# Patient Record
Sex: FEMALE | Race: BLACK OR AFRICAN AMERICAN | Employment: FULL TIME | URBAN - METROPOLITAN AREA
[De-identification: names, ages, dates, MRNs, and addresses within clinical notes are randomized per-mention and may not be internally consistent; named-entity substitution may affect disease eponyms.]

---

## 2017-01-01 ENCOUNTER — HOSPITAL ENCOUNTER (EMERGENCY)
Age: 32
Discharge: HOME | End: 2017-01-01
Attending: EMERGENCY MEDICINE
Payer: COMMERCIAL

## 2017-01-01 VITALS
RESPIRATION RATE: 18 BRPM | DIASTOLIC BLOOD PRESSURE: 79 MMHG | OXYGEN SATURATION: 99 % | TEMPERATURE: 98 F | BODY MASS INDEX: 40.18 KG/M2 | WEIGHT: 280 LBS | SYSTOLIC BLOOD PRESSURE: 115 MMHG | HEART RATE: 63 BPM
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
## 2017-01-01 DIAGNOSIS — G89.29 ACUTE EXACERBATION OF CHRONIC LOW BACK PAIN: ICD-10-CM

## 2017-01-01 DIAGNOSIS — M54.50 ACUTE EXACERBATION OF CHRONIC LOW BACK PAIN: ICD-10-CM

## 2017-01-01 PROCEDURE — 99282 EMERGENCY DEPT VISIT SF MDM: CPT

## 2017-01-01 RX ORDER — IBUPROFEN 800 MG/1
800 TABLET ORAL
Qty: 20 TAB | Refills: 0 | Status: SHIPPED | OUTPATIENT
Start: 2017-01-01 | End: 2017-01-08

## 2017-01-01 RX ORDER — TRAMADOL HYDROCHLORIDE 50 MG/1
50 TABLET ORAL
Qty: 6 TAB | Refills: 0 | Status: SHIPPED | OUTPATIENT
Start: 2017-01-01 | End: 2017-01-01

## 2017-01-01 RX ORDER — METHOCARBAMOL 500 MG/1
500 TABLET, FILM COATED ORAL 4 TIMES DAILY
Qty: 20 TAB | Refills: 0 | Status: SHIPPED | OUTPATIENT
Start: 2017-01-01 | End: 2017-01-06

## 2017-01-02 NOTE — ED NOTES
Suhas Greer is a 33 y.o. female that was discharged in stable. Pt was accompanied by self. Pt is driving. The patients diagnosis, condition and treatment were explained to  patient and aftercare instructions were given. The patient verbalized understanding. Patient armband removed and shredded.
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
## 2017-03-13 ENCOUNTER — CLINICAL SUPPORT (OUTPATIENT)
Dept: SURGERY | Age: 32
End: 2017-03-13

## 2017-03-13 ENCOUNTER — HOSPITAL ENCOUNTER (OUTPATIENT)
Dept: LAB | Age: 32
Discharge: HOME | End: 2017-03-13
Payer: COMMERCIAL

## 2017-03-13 ENCOUNTER — OFFICE VISIT (OUTPATIENT)
Dept: SURGERY | Age: 32
End: 2017-03-13

## 2017-03-13 VITALS
OXYGEN SATURATION: 100 % | WEIGHT: 286 LBS | HEIGHT: 70 IN | BODY MASS INDEX: 40.94 KG/M2 | HEART RATE: 70 BPM | DIASTOLIC BLOOD PRESSURE: 83 MMHG | SYSTOLIC BLOOD PRESSURE: 138 MMHG

## 2017-03-13 VITALS — BODY MASS INDEX: 40.94 KG/M2 | WEIGHT: 286 LBS | HEIGHT: 70 IN

## 2017-03-13 DIAGNOSIS — E28.2 PCOS (POLYCYSTIC OVARIAN SYNDROME): ICD-10-CM

## 2017-03-13 DIAGNOSIS — N92.6 IRREGULAR MENSES: ICD-10-CM

## 2017-03-13 DIAGNOSIS — M25.562 ARTHRALGIA OF BOTH KNEES: ICD-10-CM

## 2017-03-13 DIAGNOSIS — E66.01 MORBID OBESITY WITH BMI OF 40.0-44.9, ADULT (HCC): Primary | ICD-10-CM

## 2017-03-13 DIAGNOSIS — E11.9 TYPE 2 DIABETES MELLITUS WITHOUT COMPLICATION, WITHOUT LONG-TERM CURRENT USE OF INSULIN (HCC): ICD-10-CM

## 2017-03-13 DIAGNOSIS — M54.9 CHRONIC BACK PAIN, UNSPECIFIED BACK LOCATION, UNSPECIFIED BACK PAIN LATERALITY: ICD-10-CM

## 2017-03-13 DIAGNOSIS — E66.01 MORBID OBESITY WITH BODY MASS INDEX OF 40.0-49.9 (HCC): ICD-10-CM

## 2017-03-13 DIAGNOSIS — I10 ESSENTIAL HYPERTENSION: ICD-10-CM

## 2017-03-13 DIAGNOSIS — M25.561 ARTHRALGIA OF BOTH KNEES: ICD-10-CM

## 2017-03-13 DIAGNOSIS — G89.29 CHRONIC BACK PAIN, UNSPECIFIED BACK LOCATION, UNSPECIFIED BACK PAIN LATERALITY: ICD-10-CM

## 2017-03-13 DIAGNOSIS — E66.01 MORBID OBESITY DUE TO EXCESS CALORIES (HCC): Primary | ICD-10-CM

## 2017-03-13 LAB
BASOPHILS # BLD AUTO: 0 K/UL (ref 0–0.06)
BASOPHILS # BLD: 0 % (ref 0–2)
DIFFERENTIAL METHOD BLD: ABNORMAL
EOSINOPHIL # BLD: 0.5 K/UL (ref 0–0.4)
EOSINOPHIL NFR BLD: 4 % (ref 0–5)
ERYTHROCYTE [DISTWIDTH] IN BLOOD BY AUTOMATED COUNT: 13.6 % (ref 11.6–14.5)
HCT VFR BLD AUTO: 37.3 % (ref 35–45)
HGB BLD-MCNC: 12.1 G/DL (ref 12–16)
LYMPHOCYTES # BLD AUTO: 35 % (ref 21–52)
LYMPHOCYTES # BLD: 4.5 K/UL (ref 0.9–3.6)
MCH RBC QN AUTO: 26.9 PG (ref 24–34)
MCHC RBC AUTO-ENTMCNC: 32.4 G/DL (ref 31–37)
MCV RBC AUTO: 83.1 FL (ref 74–97)
MONOCYTES # BLD: 0.7 K/UL (ref 0.05–1.2)
MONOCYTES NFR BLD AUTO: 5 % (ref 3–10)
NEUTS SEG # BLD: 7.2 K/UL (ref 1.8–8)
NEUTS SEG NFR BLD AUTO: 56 % (ref 40–73)
PLATELET # BLD AUTO: 322 K/UL (ref 135–420)
PMV BLD AUTO: 9.5 FL (ref 9.2–11.8)
RBC # BLD AUTO: 4.49 M/UL (ref 4.2–5.3)
WBC # BLD AUTO: 12.9 K/UL (ref 4.6–13.2)

## 2017-03-13 PROCEDURE — 86677 HELICOBACTER PYLORI ANTIBODY: CPT | Performed by: SPECIALIST

## 2017-03-13 PROCEDURE — 36415 COLL VENOUS BLD VENIPUNCTURE: CPT | Performed by: SPECIALIST

## 2017-03-13 PROCEDURE — 85025 COMPLETE CBC W/AUTO DIFF WBC: CPT | Performed by: SPECIALIST

## 2017-03-13 PROCEDURE — 84443 ASSAY THYROID STIM HORMONE: CPT | Performed by: SPECIALIST

## 2017-03-13 NOTE — PATIENT INSTRUCTIONS
Body Mass Index: Care Instructions  Your Care Instructions    Body mass index (BMI) can help you see if your weight is raising your risk for health problems. It uses a formula to compare how much you weigh with how tall you are. A BMI between 18.5 and 24.9 is considered healthy. A BMI between 25 and 29.9 is considered overweight. A BMI of 30 or higher is considered obese. If your BMI is in the normal range, it means that you have a lower risk for weight-related health problems. If your BMI is in the overweight or obese range, you may be at increased risk for weight-related health problems, such as high blood pressure, heart disease, stroke, arthritis or joint pain, and diabetes. BMI is just one measure of your risk for weight-related health problems. You may be at higher risk for health problems if you are not active, you eat an unhealthy diet, or you drink too much alcohol or use tobacco products. Follow-up care is a key part of your treatment and safety. Be sure to make and go to all appointments, and call your doctor if you are having problems. It's also a good idea to know your test results and keep a list of the medicines you take. How can you care for yourself at home? · Practice healthy eating habits. This includes eating plenty of fruits, vegetables, whole grains, lean protein, and low-fat dairy. · Get at least 30 minutes of exercise 5 days a week or more. Brisk walking is a good choice. You also may want to do other activities, such as running, swimming, cycling, or playing tennis or team sports. · Do not smoke. Smoking can increase your risk for health problems. If you need help quitting, talk to your doctor about stop-smoking programs and medicines. These can increase your chances of quitting for good. · Limit alcohol to 2 drinks a day for men and 1 drink a day for women. Too much alcohol can cause health problems.   If you have a BMI higher than 25  · Your doctor may do other tests to check your risk for weight-related health problems. This may include measuring the distance around your waist. A waist measurement of more than 40 inches in men or 35 inches in women can increase the risk of weight-related health problems. · Talk with your doctor about steps you can take to stay healthy or improve your health. You may need to make lifestyle changes to lose weight and stay healthy, such as changing your diet and getting regular exercise. Where can you learn more? Go to http://león-irving.info/. Enter S176 in the search box to learn more about \"Body Mass Index: Care Instructions. \"  Current as of: February 16, 2016  Content Version: 11.1  © 0563-7937 [employer]. Care instructions adapted under license by [employer] (which disclaims liability or warranty for this information). If you have questions about a medical condition or this instruction, always ask your healthcare professional. [place] [address] any warranty or liability for your use of this information. New patient Instructions      1. Ensure all pre-operative insurances requirements are complete (ie; dietary visits, psychology consults, primary care documentation, etc)    2. Adhere to pre-operative weight loss / weight maintenance plan discussed in the office today. 3. Contact the office with any questions on pre-operative clearance issues (ie; cardiology work-up, pulmonary work-up, upper GI study, etc). 4. If a barium upper GI study has been ordered for your evaluation, make sure you are on liquids only the morning of the procedure.

## 2017-03-13 NOTE — PROGRESS NOTES
Medical Weight Loss Multidisciplinary Program    Name: Cristal Whalen   : 1985    Session# 1  Date: 3/13/2017            lbs.  There is no height or weight on file to calculate BMI. Pounds Gained: 6    Dietary Instructions    Reviewed intake  Understanding label reading  Understanding low carbohydrates, low sugar, higher protein meals  Understanding proper portions  Dining outside home  Instruction given for personal dietary changes  Comments: Pt given brief pre/post-op diet ed and diet hx reviewed. Physical Activity/Exercise    Discussed Perceived Compliance  Reasonable Goals Set  Motivation  Comments: Pt does not currently have a physical activity plan in place. See goal below. Behavior Modification    Positive attitude  Comments: Pt is working on the following goals:  1. Start out doing 5-10 minutes on the treadmill everyday; and workout on Wed & Thursday. 2. Have 3 balanced meals per day with a lean protein source. 3. Avoid skipping meals; you can use one as a meal replacement per day. Candidate for surgery (per RD): Pending    Dietitian: Morgan Taylor RD     Cristal Whalen is a 33 y.o. female who present for a pre-op evaluation. There were no vitals taken for this visit.   Past Medical History:   Diagnosis Date    Diabetes ([facility] [address]     Endometrial polyp     PCOS (polycystic ovarian syndrome)     Endometriosis         Patient Education and Materials Provided:  Supplement Resource Guide, B Vitamin Information, MVI Recommendations, Calcium Citrate Information, Bariatric Supplement Companies, Protein Supplement Information, Fluid Requirements, No Caffeine or Carbonation, No Alcohol for One Year Post Op, 3 Balanced Meals a Day, Food Group Guide, Good Choices Dining Out, No Snacks, No Concentrated Sweets, Support System at Home, Exercising, Support Group Information and Addressed Current Habits / Changes to make     Reasons for Surgery:  BMI > 40 with one or more medically significant comorbidities    Summary:  Pt given brief pre/post-op diet ed and diet hx reviewed. Pt set several goals. See below. Candidate for surgery:   Pending    Procedure:  laparoscopic sleeve gastrectomy    Nutritional Hx: What is the number of meals you eat per day? 2  Comment: lunch    How many sodas do you drink per day? 1, loves Ginger Ale (1 per day), sometimes has a [employer] in addition for the caffeine (works night shift)    How many caffeinated drinks do you have per day? 2    How much water do you drink per day? 64oz    How often do you eat fast food? 7 times a week    How often do you consume alcohol? occasionally        Exercise:  Do you currently have an exercise routine? no    Goals:   1. Start out doing 5-10 minutes on the treadmill everyday; and workout on Wed & Thursday. 2. Have 3 balanced meals per day with a lean protein source. 3. Avoid skipping meals; you can use one as a meal replacement per day.

## 2017-03-13 NOTE — PROGRESS NOTES
Bariatric Surgery Consultation    Subjective: The patient is a 33 y.o. obese female with a Body mass index is 41.04 kg/(m^2). Herman Riojas The patient is currently her heaviest weight for the past 2 years. she has been overweight since early adulthood. she has been considering surgery since last year. she desires surgery at this time because of multiple health concerns and their lifestyle issues which are hindered by their weight. she has been referred by his family physician Dr Jaycob Cowan for evaluation and treatment of their obesity via surgical intervention. Pawan Reed has tried multiple diets in her lifetime most recently tried behavior modification and unsupervised diets    Bariatric comorbidities present are   Patient Active Problem List   Diagnosis Code    PCOS (polycystic ovarian syndrome) E28.2    Diabetes ([address] E11.9    Endometrial polyp N84.0    Morbid obesity ([address] E66.01    Morbid obesity with body mass index of 40.0-49.9 ([address] E66.01    Hypertension I10    Irregular menses N92.6    Arthritic-like pain M25.50    Chronic back pain M54.9, G89.29       The patient is considering laparoscopic sleeve gastrectomy for surgical weight loss due to their ineffective progress with medical forms of weight loss and the urging of their physician who cares for their primary medical issues. The patient  now presents  for consideration for weight loss surgery understanding the benefits of this over a medical approach of weight loss as was discussed in our presentation on weight loss surgery. They have discussed their plans both with their family and primary care physician who is in support of their pursuit of such. The patient has had no health issues as of late and denies and gastrointestinal disturbances other than what is outlined below in their review of symptoms.  All of their prior evaluations available by both their PCP's and specialists physicians have been reviewed today either in the Care Everywhere portal or scanned under the media tab. I have spent a large portion of my initial consultation today reviewing the patients current dietary habits which have contributed to their health issues and obesity. I have suggested to them personally a dietary regimen that they can initiate now to help with their status as it pertains to their weight. They understand that the most important aspect of their journey through their weight loss endeavor will be their adherence to a new lifestyle of healthy eating behavior. They also understand that an adherence to an exercise program will not only help with weight loss but is ultimately important in weight maintenance. The patients goal weight is 188 lb. These goals are consistent with expected outcomes of their desired operation. her Medical goals are resolution of these health issues. Patient Active Problem List    Diagnosis Date Noted    Diabetes [address]     Endometrial polyp     Morbid obesity ([facility]     Morbid obesity with body mass index of 40.0-49.9 (HCC)     Hypertension     Irregular menses     Arthritic-like pain     Chronic back pain     PCOS (polycystic ovarian syndrome) 12/03/2014     History reviewed. No pertinent surgical history. Social History   Substance Use Topics    Smoking status: Never Smoker    Smokeless tobacco: Never Used    Alcohol use Yes      Comment: rare      Family History   Problem Relation Age of Onset    Diabetes Maternal Aunt     Diabetes Paternal Grandmother     Diabetes Paternal Grandfather     Diabetes Mother       Current Outpatient Prescriptions   Medication Sig Dispense Refill    metFORMIN (GLUCOPHAGE) 1,000 mg tablet Take 1 tablet by mouth two (2) times daily (with meals). 60 tablet 5    lisinopril-hydrochlorothiazide (PRINZIDE, ZESTORETIC) 10-12.5 mg per tablet Take 1 tablet by mouth daily.  30 tablet 5     No Known Allergies       Review of Systems:     General - No history or complaints of unexpected fever, chills, or weight loss  Head/Neck - No history or complaints of headache, diplopia, dysphagia, hearing loss  Cardiac - No history or complaints of chest pain, palpitations, murmur, or shortness of breath  Pulmonary - No history or complaints of shortness of breath, productive cough, hemoptysis  Gastrointestinal - no reflux, no abdominal pain, obstipation/constipation or blood per rectum  Genitourinary - No history or complaints of hematuria/dysuria, stress urinary incontinence symptoms, or renal lithiasis  Musculoskeletal - moderate joint pain in their lower back,  no muscular weakness  Hematologic - No history or complaints of bleeding disorders,  No blood transfusions  Neurologic - No history or complaints of  migraine headaches, seizure activity, syncopal episodes, TIA or stroke  Integumentary - No history or complaints of rashes, abnormal nevi, skin cancer  Gynecological - irregular menses    Objective:     Visit Vitals    /83 (BP 1 Location: Left arm, BP Patient Position: Sitting)    Pulse 70    Ht 5' 10\" (1.778 m)    Wt 129.7 kg (286 lb)    SpO2 100%    BMI 41.04 kg/m2       Physical Examination: General appearance - alert, well appearing, and in no distress and oriented to person, place, and time  Mental status - alert, oriented to person, place, and time, normal mood, behavior, speech, dress, motor activity, and thought processes  Eyes - pupils equal and reactive, extraocular eye movements intact, sclera anicteric, left eye normal, right eye normal  Ears - bilateral TM's and external ear canals normal, right ear normal, left ear normal  Nose - normal and patent, no erythema, discharge or polyps and normal nontender sinuses  Mouth - mucous membranes moist, pharynx normal without lesions  Neck - supple, no significant adenopathy  Lymphatics - no palpable lymphadenopathy, no hepatosplenomegaly  Chest - clear to auscultation, no wheezes, rales or rhonchi, symmetric air entry  Heart - normal rate, regular rhythm, normal S1, S2, no murmurs, rubs, clicks or gallops  Abdomen - soft, nontender, nondistended, no masses or organomegaly  Back exam - full range of motion, no tenderness, palpable spasm or pain on motion  Neurological - alert, oriented, normal speech, no focal findings or movement disorder noted  Musculoskeletal - no joint tenderness, deformity or swelling  Extremities - peripheral pulses normal, no pedal edema, no clubbing or cyanosis  Skin - normal coloration and turgor, no rashes, no suspicious skin lesions noted    Labs:       No results found for this or any previous visit (from the past 1440 hour(s)). Assessment:     Morbid obesity with comorbidity    Plan:     laparoscopic sleeve gastrectomy    This is a 33 y.o. female with a BMI of Body mass index is 41.04 kg/(m^2). and the weight-related co-morbidties as noted below. [facility] meets the NIH criteria for bariatric surgery based upon the BMI of Body mass index is 41.04 kg/(m^2). and multiple weight-related co-morbidties. [facility] has elected laparoscopic sleeve gastrectomy as her intervention of choice for treatment of morbid obestiy through surgical means secondary to its safety profile, rapid return to work  and decreases in operative risks over gastric bypass. In the office today, following Dulce's history and physical examination, a 30 minute discussion regarding the anatomic alterations for the laparoscopic sleeve gastrectomy was undertaken. The dietary expectations and the patient and physician dependent factors for success were thoroughly discussed, to include the need for interval follow-up and long-term dietary changes associated with success. The possible complications of the sleeve gastrectomy  were also discussed, to include;death, DVT/PE, staple line leak, bleeding, stricture formation, infection, nutritional deficiencies and sleeve dilation.   Specific weight related outcomes for success were also discussed with an emphasis on careful and close follow-up with the first year and eating behavior modification as the baseline and cyclical hunger return. The patient expressed an understanding of the above factors, and her questions were answered in their entirety. In addition, the patient attended a 1.5 hour power point seminar regarding obesity, surgical weight loss including, adjustable gastric band, gastric bypass, and sleeve gastrectomy. This discussion contrasted the different surgical techniques, mechanisms of actions and expected outcomes, and surgical and medical risks associated with each procedure. During this seminar, there was a long question and answer session where each questions was answered until there were no additional questions. Today, the patient had all of her questions answered and desires to proceed with  bariatric surgery initially choosing sleeve gastrectomy as her surgical option. Despite indicating she would not receive a blood transfusion on her office paperwork, she now agrees to that process if it is medically indicated. Secondary Diagnoses:     Dietary Intervention  - The patient is currently scheduled to see or has been followed by a bariatric nutritionist for an attempt at preoperative weight loss as has been dictated by their insurance carrier. They will be assessed at various times during their follow up to evaluate their progress depending on the length of time that is required once again by their carrier. I have explained the importance of preoperative weight loss and the benefits regarding lower surgical risk and also assisting the patient in reaching their weight loss goal.  Finally they understand their is a physiologic benefit from the standpoint of hepatic volume reduction preoperatively.   I have reiterated the importance of a low carbohydrate and high protein regimen to achieve their stated goal.    Polycystic Ovarian Syndrome -The patient does understand the association between obesity and the development of PCOS.  They understand that weight loss can often improve symptoms and in many cases cure the disease.  If the disease is associated with infertility this too is often corrected with adequate weight loss.  The patient understands that any change to the pharmaceutical treatment of her PCOS will be managed by the primary care physician or OB/GYN    Hypertension - The patient has a clear understanding of how weight loss improves hypertension as a whole, but also they understand that there is a significant genetic component to this disease process. We will monitor the patients blood pressure while in the hospital and the plan would be to continue those medications postoperatively.  If a diuretic is being used we will stop them on discharge to prevent dehydration particularly with the sleeve gastrectomy and the gastric bypass procedures.  They will be instructed to monitor their blood pressure postoperatively while at home and notify their primary care physician in the event of any significantly high or uncharacteristic readings. Adult Onset Diabetes - The patient has lisa given a very low carbohydrate diet preoperatively along with instructions to monitor their blood sugars on a regular daily basis. When  their surgery is performed  we will be monitoring the patient with sliding scale insulin and accuchecks.  Based on those values we will determine whether the patient needs a reduction of those medications postoperatively or total removal of those medications on discharge.  We will have the patient continue accuchecks postoperatively while at home also and report to me or their family physician for appropriate adjustments as needed.  The patient also understands that in the event of uncontrolled blood sugar preoperatively that we may choose to postpone their surgery.     Weight Related Arthritis -The patient understands the benefits that weight loss surgery can have on their arthritis but also understands that weight loss is not a guaranteed cure and relief of symptoms is often dependent on the severity of the underlying disease.  The patient also understands that traditional pharmaceutical treatments for this diagnosis are usually unavailable to post-operative weight loss patients due to the effects on the gastrointestinal tract particularly with the gastric bypass and to a lesser effect with the sleeve gastrectomy.  Any changes to the patients medication treatment will ultimately be made the patients PCP with input by our office.     Signed By: Isidro Jorge MD     March 13, 2017

## 2017-03-13 NOTE — PATIENT INSTRUCTIONS
Goals: 1. Start out doing 5-10 minutes on the treadmill everyday; and workout on Wed & Thursday. 2. Have 3 balanced meals per day with a lean protein source. 3. Avoid skipping meals; you can use one as a meal replacement per day.

## 2017-03-13 NOTE — MR AVS SNAPSHOT
Visit Information Date & Time Provider Department Dept. Phone Encounter #  
 3/13/2017  4:00 PM Jude Munoz MD [facility] [place] [id number] Follow-up Instructions Follow-up and Disposition History Your Appointments 3/15/2017 11:30 AM  
ANNUAL with Bk Caraballo MD  
[facility] ([address] [facility]) Appt Note: Annual pt aware of new loc syb 12/29/16; annual pt aware of new address emre from 1-19-17; annual pt aware of new address emre from 2-13-17  
 [address], [address] [facility] [address]  
[phone]  
  
   
 [address], [address] [facility] 4/12/2017  2:30 PM  
NUTRITION COUNSELING with TSS NUTRI VISIT PEN [facility] [place] ([facility]) Appt Note: 2 of 4  
 [id number] [facility] [address] Upcoming Health Maintenance Date Due  
 FOOT EXAM Q1 3/21/1993 MICROALBUMIN Q1 3/21/1993 EYE EXAM RETINAL OR DILATED Q1 3/21/1993 Pneumococcal 19-64 Medium Risk (1 of 1 - PPSV23) 3/21/2002 HEMOGLOBIN A1C Q6M 6/3/2015 LIPID PANEL Q1 12/3/2015 INFLUENZA AGE 9 TO ADULT 8/1/2016 PAP AKA CERVICAL CYTOLOGY 8/24/2018 DTaP/Tdap/Td series (2 - Td) 12/14/2023 Allergies as of 3/13/2017  Review Complete On: 3/13/2017 By: Jude Munoz MD  
 No Known Allergies Current Immunizations  Never Reviewed Name Date TDAP Vaccine 11/7/2012 11:37 AM  
 Tdap 12/14/2013  8:07 PM  
  
 Not reviewed this visit You Were Diagnosed With   
  
 Codes Comments Morbid obesity due to excess calories ([facility] [address] [address]    -  Primary ICD-10-CM: E66.01 
ICD-9-CM: 278.01  Morbid obesity with body mass index of 40.0-49.9 (HCC)     ICD-10-CM: E66.01 
ICD-9-CM: 278.01   
 Type 2 diabetes mellitus without complication, without long-term current use of insulin (HCC)     ICD-10-CM: E11.9 ICD-9-CM: 250.00 Essential hypertension     ICD-10-CM: I10 
ICD-9-CM: 401.9 Irregular menses     ICD-10-CM: N92.6 ICD-9-CM: 626.4 Arthralgia of both knees     ICD-10-CM: M25.561, M25.562 ICD-9-CM: 719.46 Chronic back pain, unspecified back location, unspecified back pain laterality     ICD-10-CM: M54.9, G89.29 ICD-9-CM: 724.5, 338.29 PCOS (polycystic ovarian syndrome)     ICD-10-CM: E28.2 ICD-9-CM: 256.4 Vitals BP Pulse Height(growth percentile) Weight(growth percentile) SpO2 BMI  
 138/83 (BP 1 Location: Left arm, BP Patient Position: Sitting) 70 5' 10\" (1.778 m) 286 lb (129.7 kg) 100% 41.04 kg/m2 OB Status Smoking Status Having regular periods Never Smoker BMI and BSA Data Body Mass Index Body Surface Area 41.04 kg/m 2 2.53 m 2 Preferred Pharmacy Pharmacy Name Phone [facility] [address] [phone] Your Updated Medication List  
  
   
This list is accurate as of: 3/13/17  4:42 PM.  Always use your most recent med list.  
  
  
  
  
 lisinopril-hydroCHLOROthiazide 10-12.5 mg per tablet Commonly known as:  Hayder Decent Take 1 tablet by mouth daily. metFORMIN 1,000 mg tablet Commonly known as:  GLUCOPHAGE Take 1 tablet by mouth two (2) times daily (with meals). To-Do List   
 03/13/2017 Lab:  CBC WITH AUTOMATED DIFF   
  
 03/13/2017 Lab:  H. PYLORI ABS, IGG, IGA, IGM   
  
 03/13/2017 Lab:  TSH 3RD GENERATION Patient Instructions Body Mass Index: Care Instructions Your Care Instructions Body mass index (BMI) can help you see if your weight is raising your risk for health problems.  It uses a formula to compare how much you weigh with how tall you are. A BMI between 18.5 and 24.9 is considered healthy. A BMI between 25 and 29.9 is considered overweight. A BMI of 30 or higher is considered obese. If your BMI is in the normal range, it means that you have a lower risk for weight-related health problems. If your BMI is in the overweight or obese range, you may be at increased risk for weight-related health problems, such as high blood pressure, heart disease, stroke, arthritis or joint pain, and diabetes. BMI is just one measure of your risk for weight-related health problems. You may be at higher risk for health problems if you are not active, you eat an unhealthy diet, or you drink too much alcohol or use tobacco products. Follow-up care is a key part of your treatment and safety. Be sure to make and go to all appointments, and call your doctor if you are having problems. It's also a good idea to know your test results and keep a list of the medicines you take. How can you care for yourself at home? · Practice healthy eating habits. This includes eating plenty of fruits, vegetables, whole grains, lean protein, and low-fat dairy. · Get at least 30 minutes of exercise 5 days a week or more. Brisk walking is a good choice. You also may want to do other activities, such as running, swimming, cycling, or playing tennis or team sports. · Do not smoke. Smoking can increase your risk for health problems. If you need help quitting, talk to your doctor about stop-smoking programs and medicines. These can increase your chances of quitting for good. · Limit alcohol to 2 drinks a day for men and 1 drink a day for women. Too much alcohol can cause health problems. If you have a BMI higher than 25 · Your doctor may do other tests to check your risk for weight-related health problems.  This may include measuring the distance around your waist. A waist measurement of more than 40 inches in men or 35 inches in women can increase the risk of weight-related health problems. · Talk with your doctor about steps you can take to stay healthy or improve your health. You may need to make lifestyle changes to lose weight and stay healthy, such as changing your diet and getting regular exercise. Where can you learn more? Go to http://león-irving.info/. Enter S176 in the search box to learn more about \"Body Mass Index: Care Instructions. \" Current as of: February 16, 2016 Content Version: 11.1 © 9022-4316 [employer]. Care instructions adapted under license by [employer] (which disclaims liability or warranty for this information). If you have questions about a medical condition or this instruction, always ask your healthcare professional. [address] any warranty or liability for your use of this information. New patient Instructions 1. Ensure all pre-operative insurances requirements are complete (ie; dietary visits, psychology consults, primary care documentation, etc) 2. Adhere to pre-operative weight loss / weight maintenance plan discussed in the office today. 3. Contact the office with any questions on pre-operative clearance issues (ie; cardiology work-up, pulmonary work-up, upper GI study, etc). 4. If a barium upper GI study has been ordered for your evaluation, make sure you are on liquids only the morning of the procedure. Patient Instructions History Introducing [facility]! Dear Jonny Mota: Thank you for requesting a [employer] account. Our records indicate that you already have an active [employer] account. You can access your account anytime at [email or web address]. [email or web address] Did you know that you can access your hospital and ER discharge instructions at any time in [employer]? You can also review all of your test results from your hospital stay or ER visit. Additional Information If you have questions, please visit the Frequently Asked Questions section of the [employer] website at [email or web address] [email or web address]. [employer]. [email or web address] Remember, [employer] is NOT to be used for urgent needs. For medical emergencies, dial 911. Now available from your iPhone and Android! Please provide this summary of care documentation to your next provider. Your primary care clinician is listed as Christine Blount. If you have any questions after today's visit, please call [phone].

## 2017-03-14 LAB
H PYLORI IGA SER-ACNC: <9 UNITS (ref 0–8.9)
H PYLORI IGG SER IA-ACNC: <0.9 U/ML (ref 0–0.8)
H PYLORI IGM SER-ACNC: <9 UNITS (ref 0–8.9)
TSH SERPL DL<=0.05 MIU/L-ACNC: 1.96 UIU/ML (ref 0.36–3.74)

## 2017-03-15 ENCOUNTER — HOSPITAL ENCOUNTER (OUTPATIENT)
Dept: LAB | Age: 32
Discharge: HOME | End: 2017-03-15
Payer: COMMERCIAL

## 2017-03-15 ENCOUNTER — OFFICE VISIT (OUTPATIENT)
Dept: OBGYN CLINIC | Age: 32
End: 2017-03-15

## 2017-03-15 VITALS
DIASTOLIC BLOOD PRESSURE: 73 MMHG | SYSTOLIC BLOOD PRESSURE: 131 MMHG | WEIGHT: 281 LBS | BODY MASS INDEX: 40.23 KG/M2 | HEIGHT: 70 IN | HEART RATE: 81 BPM

## 2017-03-15 DIAGNOSIS — Z01.419 ENCOUNTER FOR WELL WOMAN EXAM WITH ROUTINE GYNECOLOGICAL EXAM: Primary | ICD-10-CM

## 2017-03-15 DIAGNOSIS — Z01.419 WELL WOMAN EXAM WITH ROUTINE GYNECOLOGICAL EXAM: ICD-10-CM

## 2017-03-15 PROCEDURE — 87491 CHLMYD TRACH DNA AMP PROBE: CPT | Performed by: OBSTETRICS & GYNECOLOGY

## 2017-03-15 PROCEDURE — 88175 CYTOPATH C/V AUTO FLUID REDO: CPT | Performed by: OBSTETRICS & GYNECOLOGY

## 2017-03-15 NOTE — PATIENT INSTRUCTIONS
Well Visit, Ages 25 to 48: Care Instructions  Your Care Instructions  Physical exams can help you stay healthy. Your doctor has checked your overall health and may have suggested ways to take good care of yourself. He or she also may have recommended tests. At home, you can help prevent illness with healthy eating, regular exercise, and other steps. Follow-up care is a key part of your treatment and safety. Be sure to make and go to all appointments, and call your doctor if you are having problems. It's also a good idea to know your test results and keep a list of the medicines you take. How can you care for yourself at home? · Reach and stay at a healthy weight. This will lower your risk for many problems, such as obesity, diabetes, heart disease, and high blood pressure. · Get at least 30 minutes of physical activity on most days of the week. Walking is a good choice. You also may want to do other activities, such as running, swimming, cycling, or playing tennis or team sports. Discuss any changes in your exercise program with your doctor. · Do not smoke or allow others to smoke around you. If you need help quitting, talk to your doctor about stop-smoking programs and medicines. These can increase your chances of quitting for good. · Talk to your doctor about whether you have any risk factors for sexually transmitted infections (STIs). Having one sex partner (who does not have STIs and does not have sex with anyone else) is a good way to avoid these infections. · Use birth control if you do not want to have children at this time. Talk with your doctor about the choices available and what might be best for you. · Protect your skin from too much sun. When you're outdoors from 10 a.m. to 4 p.m., stay in the shade or cover up with clothing and a hat with a wide brim. Wear sunglasses that block UV rays. Even when it's cloudy, put broad-spectrum sunscreen (SPF 30 or higher) on any exposed skin.   · See a dentist one or two times a year for checkups and to have your teeth cleaned. · Wear a seat belt in the car. · Drink alcohol in moderation, if at all. That means no more than 2 drinks a day for men and 1 drink a day for women. Follow your doctor's advice about when to have certain tests. These tests can spot problems early. For everyone  · Cholesterol. Have the fat (cholesterol) in your blood tested after age 21. Your doctor will tell you how often to have this done based on your age, family history, or other things that can increase your risk for heart disease. · Blood pressure. Have your blood pressure checked during a routine doctor visit. Your doctor will tell you how often to check your blood pressure based on your age, your blood pressure results, and other factors. · Vision. Talk with your doctor about how often to have a glaucoma test.  · Diabetes. Ask your doctor whether you should have tests for diabetes. · Colon cancer. Have a test for colon cancer at age 48. You may have one of several tests. If you are younger than 48, you may need a test earlier if you have any risk factors. Risk factors include whether you already had a precancerous polyp removed from your colon or whether your parent, brother, sister, or child has had colon cancer. For women  · Breast exam and mammogram. Talk to your doctor about when you should have a clinical breast exam and a mammogram. Medical experts differ on whether and how often women under 50 should have these tests. Your doctor can help you decide what is right for you. · Pap test and pelvic exam. Begin Pap tests at age 24. A Pap test is the best way to find cervical cancer. The test often is part of a pelvic exam. Ask how often to have this test.  · Tests for sexually transmitted infections (STIs). Ask whether you should have tests for STIs. You may be at risk if you have sex with more than one person, especially if your partners do not wear condoms.   For men  · Tests for sexually transmitted infections (STIs). Ask whether you should have tests for STIs. You may be at risk if you have sex with more than one person, especially if you do not wear a condom. · Testicular cancer exam. Ask your doctor whether you should check your testicles regularly. · Prostate exam. Talk to your doctor about whether you should have a blood test (called a PSA test) for prostate cancer. Experts differ on whether and when men should have this test. Some experts suggest it if you are older than 39 and are -American or have a father or brother who got prostate cancer when he was younger than 72. When should you call for help? Watch closely for changes in your health, and be sure to contact your doctor if you have any problems or symptoms that concern you. Where can you learn more? Go to http://león-irving.info/. Enter P072 in the search box to learn more about \"Well Visit, Ages 25 to 48: Care Instructions. \"  Current as of: July 19, 2016  Content Version: 11.1  © 4421-1832 [employer]. Care instructions adapted under license by [employer] (which disclaims liability or warranty for this information). If you have questions about a medical condition or this instruction, always ask your healthcare professional. [place] [address] any warranty or liability for your use of this information. Polycystic Ovary Syndrome: Care Instructions  Your Care Instructions  Polycystic ovary syndrome, or PCOS, means a woman's hormones are out of balance. It can cause problems with your periods and make it hard to get pregnant. Doctors don't know for sure what causes PCOS, but it seems to run in families. It also seems to be linked to obesity and a risk for diabetes. If you have PCOS, your sisters and daughters have a higher chance of getting it too. You may have other symptoms.  These include weight gain, acne, too much hair growth on the face or body, high blood pressure, and high blood sugar. Your ovaries may have cysts on them. These cysts are growths filled with fluid. Keep in mind that although you may not have regular periods, you can still get pregnant. Talk to your doctor about birth control if you do not want to get pregnant. Sometimes the hormone changes with PCOS can also make it hard for some women to get pregnant. If this is a concern, talk to your doctor about treatment for this problem. Women who have PCOS can go for months or longer with no period. Your doctor may recommend medicines that can help get your cycles back to normal.  Follow-up care is a key part of your treatment and safety. Be sure to make and go to all appointments, and call your doctor if you are having problems. It's also a good idea to know your test results and keep a list of the medicines you take. How can you care for yourself at home? · Take your medicines exactly as prescribed. Call your doctor if you think you are having a problem with your medicine. · Eat a healthy diet. Include fruits, vegetables, beans, and whole grains in your diet each day. · If you are overweight, losing weight can help with many of the symptoms of PCOS. Talk to your doctor about safe ways to lose weight. · Get at least 30 minutes of exercise on most days of the week. Walking is a good choice. Or you can run, swim, cycle, or play tennis or team sports. · For hair growth you don't want, try bleaching, plucking, electrolysis, or laser therapy. · Acne can be treated with over-the-counter medicines. Look for ones that have benzoyl peroxide or salicylic acid in them. When should you call for help? Call your doctor now or seek immediate medical care if:  · You have severe vaginal bleeding. This means that you are soaking through your usual pads or tampons each hour for 2 or more hours.   Watch closely for changes in your health, and be sure to contact your doctor if:  · You have more vaginal bleeding, or bleeding is more irregular. Where can you learn more? Go to http://león-irving.info/. Enter M389 in the search box to learn more about \"Polycystic Ovary Syndrome: Care Instructions. \"  Current as of: February 25, 2016  Content Version: 11.1  © 2084-7384 [employer]. Care instructions adapted under license by [employer] (which disclaims liability or warranty for this information). If you have questions about a medical condition or this instruction, always ask your healthcare professional. [address] any warranty or liability for your use of this information.

## 2017-03-15 NOTE — MR AVS SNAPSHOT
Visit Information Date & Time Provider Department Dept. Phone Encounter #  
 3/15/2017 11:30 AM Doris Lorenzo [id number] Follow-up Instructions Return if symptoms worsen or fail to improve. Your Appointments 4/12/2017  2:30 PM  
NUTRITION COUNSELING with TSS NUTRI VISIT PEN [facility] Kathleen Haddad ([address]) Appt Note: 2 of 4  
 [address] [facility] [address]  
  
   
 [address] [place] [address] Upcoming Health Maintenance Date Due INFLUENZA AGE 9 TO ADULT 8/1/2016 PAP AKA CERVICAL CYTOLOGY 8/24/2018 Allergies as of 3/15/2017  Review Complete On: 3/15/2017 By: Deep John MD  
 No Known Allergies Current Immunizations  Never Reviewed Name Date TDAP Vaccine 11/7/2012 11:37 AM  
 Tdap 12/14/2013  8:07 PM  
  
 Not reviewed this visit You Were Diagnosed With   
  
 Codes Comments Encounter for well woman exam with routine gynecological exam    -  Primary ICD-10-CM: T32.665 ICD-9-CM: V72.31 Well woman exam with routine gynecological exam     ICD-10-CM: D01.674 ICD-9-CM: V72.31 [V72.31] Vitals BP Pulse Height(growth percentile) Weight(growth percentile) LMP BMI  
 131/73 (BP 1 Location: Right arm, BP Patient Position: Sitting) 81 5' 10\" (1.778 m) 281 lb (127.5 kg) 01/23/2017 (Approximate) 40.32 kg/m2 OB Status Smoking Status Having regular periods Never Smoker BMI and BSA Data Body Mass Index Body Surface Area  
 40.32 kg/m 2 2.51 m 2 Preferred Pharmacy Pharmacy Name Phone [facility] [address] [phone] Your Updated Medication List  
  
   
This list is accurate as of: 3/15/17  2:20 PM.  Always use your most recent med list.  
  
  
  
  
 lisinopril-hydroCHLOROthiazide 10-12.5 mg per tablet Commonly known as:  Elyn Gell Take 1 tablet by mouth daily. metFORMIN 1,000 mg tablet Commonly known as:  GLUCOPHAGE Take 1 tablet by mouth two (2) times daily (with meals). Follow-up Instructions Return if symptoms worsen or fail to improve. Patient Instructions Well Visit, Ages 25 to 48: Care Instructions Your Care Instructions Physical exams can help you stay healthy. Your doctor has checked your overall health and may have suggested ways to take good care of yourself. He or she also may have recommended tests. At home, you can help prevent illness with healthy eating, regular exercise, and other steps. Follow-up care is a key part of your treatment and safety. Be sure to make and go to all appointments, and call your doctor if you are having problems. It's also a good idea to know your test results and keep a list of the medicines you take. How can you care for yourself at home? · Reach and stay at a healthy weight. This will lower your risk for many problems, such as obesity, diabetes, heart disease, and high blood pressure. · Get at least 30 minutes of physical activity on most days of the week. Walking is a good choice. You also may want to do other activities, such as running, swimming, cycling, or playing tennis or team sports. Discuss any changes in your exercise program with your doctor. · Do not smoke or allow others to smoke around you. If you need help quitting, talk to your doctor about stop-smoking programs and medicines. These can increase your chances of quitting for good. · Talk to your doctor about whether you have any risk factors for sexually transmitted infections (STIs). Having one sex partner (who does not have STIs and does not have sex with anyone else) is a good way to avoid these infections. · Use birth control if you do not want to have children at this time.  Talk with your doctor about the choices available and what might be best for you. · Protect your skin from too much sun. When you're outdoors from 10 a.m. to 4 p.m., stay in the shade or cover up with clothing and a hat with a wide brim. Wear sunglasses that block UV rays. Even when it's cloudy, put broad-spectrum sunscreen (SPF 30 or higher) on any exposed skin. · See a dentist one or two times a year for checkups and to have your teeth cleaned. · Wear a seat belt in the car. · Drink alcohol in moderation, if at all. That means no more than 2 drinks a day for men and 1 drink a day for women. Follow your doctor's advice about when to have certain tests. These tests can spot problems early. For everyone · Cholesterol. Have the fat (cholesterol) in your blood tested after age 21. Your doctor will tell you how often to have this done based on your age, family history, or other things that can increase your risk for heart disease. · Blood pressure. Have your blood pressure checked during a routine doctor visit. Your doctor will tell you how often to check your blood pressure based on your age, your blood pressure results, and other factors. · Vision. Talk with your doctor about how often to have a glaucoma test. 
· Diabetes. Ask your doctor whether you should have tests for diabetes. · Colon cancer. Have a test for colon cancer at age 48. You may have one of several tests. If you are younger than 48, you may need a test earlier if you have any risk factors. Risk factors include whether you already had a precancerous polyp removed from your colon or whether your parent, brother, sister, or child has had colon cancer. For women · Breast exam and mammogram. Talk to your doctor about when you should have a clinical breast exam and a mammogram. Medical experts differ on whether and how often women under 50 should have these tests. Your doctor can help you decide what is right for you. · Pap test and pelvic exam. Begin Pap tests at age 24. A Pap test is the best way to find cervical cancer. The test often is part of a pelvic exam. Ask how often to have this test. 
· Tests for sexually transmitted infections (STIs). Ask whether you should have tests for STIs. You may be at risk if you have sex with more than one person, especially if your partners do not wear condoms. For men · Tests for sexually transmitted infections (STIs). Ask whether you should have tests for STIs. You may be at risk if you have sex with more than one person, especially if you do not wear a condom. · Testicular cancer exam. Ask your doctor whether you should check your testicles regularly. · Prostate exam. Talk to your doctor about whether you should have a blood test (called a PSA test) for prostate cancer. Experts differ on whether and when men should have this test. Some experts suggest it if you are older than 39 and are -American or have a father or brother who got prostate cancer when he was younger than 72. When should you call for help? Watch closely for changes in your health, and be sure to contact your doctor if you have any problems or symptoms that concern you. Where can you learn more? Go to http://león-irving.info/. Enter P072 in the search box to learn more about \"Well Visit, Ages 25 to 48: Care Instructions. \" Current as of: July 19, 2016 Content Version: 11.1 © 9606-7736 [employer]. Care instructions adapted under license by [employer] (which disclaims liability or warranty for this information). If you have questions about a medical condition or this instruction, always ask your healthcare professional. [place] [address] any warranty or liability for your use of this information. Polycystic Ovary Syndrome: Care Instructions Your Care Instructions Polycystic ovary syndrome, or PCOS, means a woman's hormones are out of balance. It can cause problems with your periods and make it hard to get pregnant. Doctors don't know for sure what causes PCOS, but it seems to run in families. It also seems to be linked to obesity and a risk for diabetes. If you have PCOS, your sisters and daughters have a higher chance of getting it too. You may have other symptoms. These include weight gain, acne, too much hair growth on the face or body, high blood pressure, and high blood sugar. Your ovaries may have cysts on them. These cysts are growths filled with fluid. Keep in mind that although you may not have regular periods, you can still get pregnant. Talk to your doctor about birth control if you do not want to get pregnant. Sometimes the hormone changes with PCOS can also make it hard for some women to get pregnant. If this is a concern, talk to your doctor about treatment for this problem. Women who have PCOS can go for months or longer with no period. Your doctor may recommend medicines that can help get your cycles back to normal. 
Follow-up care is a key part of your treatment and safety. Be sure to make and go to all appointments, and call your doctor if you are having problems. It's also a good idea to know your test results and keep a list of the medicines you take. How can you care for yourself at home? · Take your medicines exactly as prescribed. Call your doctor if you think you are having a problem with your medicine. · Eat a healthy diet. Include fruits, vegetables, beans, and whole grains in your diet each day. · If you are overweight, losing weight can help with many of the symptoms of PCOS. Talk to your doctor about safe ways to lose weight. · Get at least 30 minutes of exercise on most days of the week. Walking is a good choice. Or you can run, swim, cycle, or play tennis or team sports. · For hair growth you don't want, try bleaching, plucking, electrolysis, or laser therapy. · Acne can be treated with over-the-counter medicines. Look for ones that have benzoyl peroxide or salicylic acid in them. When should you call for help? Call your doctor now or seek immediate medical care if: 
· You have severe vaginal bleeding. This means that you are soaking through your usual pads or tampons each hour for 2 or more hours. Watch closely for changes in your health, and be sure to contact your doctor if: 
· You have more vaginal bleeding, or bleeding is more irregular. Where can you learn more? Go to http://león-irving.info/. Enter M420 in the search box to learn more about \"Polycystic Ovary Syndrome: Care Instructions. \" Current as of: February 25, 2016 Content Version: 11.1 © 6485-7754 [employer]. Care instructions adapted under license by [employer] (which disclaims liability or warranty for this information). If you have questions about a medical condition or this instruction, always ask your healthcare professional. [place] [address] any warranty or liability for your use of this information. Introducing \[facility]\"" & HEALTH SERVICES! Dear Nina Huggins: Thank you for requesting a Crowd[employer] account. Our records indicate that you already have an active Crowd[employer] account. You can access your account anytime at [email or web address] Did you know that you can access your hospital and ER discharge instructions at any time in Crowd[employer]? You can also review all of your test results from your hospital stay or ER visit. Additional Information If you have questions, please visit the Frequently Asked Questions section of the Crowd[employer] website at [email or web address] Remember, CrowdZone is NOT to be used for urgent needs. For medical emergencies, dial 911. Now available from your iPhone and Android! Please provide this summary of care documentation to your next provider. Your primary care clinician is listed as Zayda Todd. If you have any questions after today's visit, please call [phone].

## 2017-03-15 NOTE — PROGRESS NOTES
Subjective:   33 y.o. female for Well Woman Check. Patient's last menstrual period was 01/23/2017 (approximate). Hx of oligomenorrhea, and PCOS. .    Social History: single partner, contraception - none. Pertinent past medical history: hypertension, DM, no history of  DVT, CAD liver disease, migraines or smoking. ROS:  Feeling well. No dyspnea or chest pain on exertion. No abdominal pain, change in bowel habits, black or bloody stools. No urinary tract symptoms. GYN ROS: no breast pain or new or enlarging lumps on self exam, she complains of oligomenorrhea and she has been diagnosed to have PCOS. No neurological complaints. Objective:     Visit Vitals    /73 (BP 1 Location: Right arm, BP Patient Position: Sitting)    Pulse 81    Ht 5' 10\" (1.778 m)    Wt 281 lb (127.5 kg)    LMP 01/23/2017 (Approximate)    BMI 40.32 kg/m2     The patient appears well, alert, oriented x 3, in no distress. ENT normal.  Neck supple. No adenopathy or thyromegaly. DOUGLAS. Lungs are clear, good air entry, no wheezes, rhonchi or rales. S1 and S2 normal, no murmurs, regular rate and rhythm. Abdomen soft without tenderness, guarding, mass or organomegaly. Extremities show no edema, normal peripheral pulses. Neurological is normal, no focal findings. BREAST EXAM: breasts appear normal, no suspicious masses, no skin or nipple changes or axillary nodes    PELVIC EXAM: normal external genitalia, vulva, vagina, cervix, uterus and adnexa    Assessment/Plan:   well woman  pap smear  counseled on breast self exam  return annually or prn    ICD-10-CM ICD-9-CM    1. Encounter for well woman exam with routine gynecological exam Z01.419 V72.31 PAP IG, CT-NG, RFX HPV [id number], [id number])   .

## 2017-03-16 LAB
C TRACH RRNA SPEC QL NAA+PROBE: NEGATIVE
N GONORRHOEA RRNA SPEC QL NAA+PROBE: NEGATIVE
SPECIMEN SOURCE: NORMAL

## 2017-03-17 RX ORDER — CITALOPRAM 20 MG/1
20 TABLET, FILM COATED ORAL DAILY
COMMUNITY
End: 2019-08-15

## 2017-03-29 ENCOUNTER — ANESTHESIA EVENT (OUTPATIENT)
Dept: SURGERY | Age: 32
End: 2017-03-29
Payer: COMMERCIAL

## 2017-03-30 ENCOUNTER — ANESTHESIA (OUTPATIENT)
Dept: SURGERY | Age: 32
End: 2017-03-30
Payer: COMMERCIAL

## 2017-03-30 ENCOUNTER — SURGERY (OUTPATIENT)
Age: 32
End: 2017-03-30

## 2017-03-30 ENCOUNTER — HOSPITAL ENCOUNTER (OUTPATIENT)
Age: 32
Setting detail: OUTPATIENT SURGERY
Discharge: HOME | End: 2017-03-30
Attending: OBSTETRICS & GYNECOLOGY | Admitting: OBSTETRICS & GYNECOLOGY
Payer: COMMERCIAL

## 2017-03-30 VITALS
BODY MASS INDEX: 39.65 KG/M2 | TEMPERATURE: 97.9 F | WEIGHT: 277 LBS | DIASTOLIC BLOOD PRESSURE: 76 MMHG | RESPIRATION RATE: 15 BRPM | OXYGEN SATURATION: 100 % | HEIGHT: 70 IN | SYSTOLIC BLOOD PRESSURE: 120 MMHG | HEART RATE: 73 BPM

## 2017-03-30 LAB
ABO + RH BLD: NORMAL
ALBUMIN SERPL BCP-MCNC: 4 G/DL (ref 3.4–5)
ALBUMIN/GLOB SERPL: 1.3 {RATIO} (ref 0.8–1.7)
ALP SERPL-CCNC: 107 U/L (ref 45–117)
ALT SERPL-CCNC: 20 U/L (ref 13–56)
ANION GAP BLD CALC-SCNC: 5 MMOL/L (ref 3–18)
APPEARANCE UR: CLEAR
AST SERPL W P-5'-P-CCNC: 7 U/L (ref 15–37)
BASOPHILS # BLD AUTO: 0.1 K/UL (ref 0–0.06)
BASOPHILS # BLD: 0 % (ref 0–2)
BILIRUB SERPL-MCNC: 0.3 MG/DL (ref 0.2–1)
BILIRUB UR QL: NEGATIVE
BLOOD GROUP ANTIBODIES SERPL: NORMAL
BUN SERPL-MCNC: 10 MG/DL (ref 7–18)
BUN/CREAT SERPL: 12 (ref 12–20)
CALCIUM SERPL-MCNC: 9.5 MG/DL (ref 8.5–10.1)
CHLORIDE SERPL-SCNC: 99 MMOL/L (ref 100–108)
CO2 SERPL-SCNC: 31 MMOL/L (ref 21–32)
COLOR UR: YELLOW
CREAT SERPL-MCNC: 0.81 MG/DL (ref 0.6–1.3)
DIFFERENTIAL METHOD BLD: ABNORMAL
EOSINOPHIL # BLD: 0.5 K/UL (ref 0–0.4)
EOSINOPHIL NFR BLD: 4 % (ref 0–5)
ERYTHROCYTE [DISTWIDTH] IN BLOOD BY AUTOMATED COUNT: 13.4 % (ref 11.6–14.5)
GLOBULIN SER CALC-MCNC: 3.2 G/DL (ref 2–4)
GLUCOSE BLD STRIP.AUTO-MCNC: 235 MG/DL (ref 70–110)
GLUCOSE BLD STRIP.AUTO-MCNC: 280 MG/DL (ref 70–110)
GLUCOSE SERPL-MCNC: 283 MG/DL (ref 74–99)
GLUCOSE UR STRIP.AUTO-MCNC: 500 MG/DL
HCG SERPL QL: NEGATIVE
HCG UR QL: NEGATIVE
HCG UR QL: NEGATIVE
HCT VFR BLD AUTO: 38.9 % (ref 35–45)
HGB BLD-MCNC: 12.9 G/DL (ref 12–16)
HGB UR QL STRIP: NEGATIVE
KETONES UR QL STRIP.AUTO: ABNORMAL MG/DL
LEUKOCYTE ESTERASE UR QL STRIP.AUTO: NEGATIVE
LYMPHOCYTES # BLD AUTO: 34 % (ref 21–52)
LYMPHOCYTES # BLD: 4.7 K/UL (ref 0.9–3.6)
MCH RBC QN AUTO: 27.7 PG (ref 24–34)
MCHC RBC AUTO-ENTMCNC: 33.2 G/DL (ref 31–37)
MCV RBC AUTO: 83.5 FL (ref 74–97)
MONOCYTES # BLD: 0.8 K/UL (ref 0.05–1.2)
MONOCYTES NFR BLD AUTO: 5 % (ref 3–10)
NEUTS SEG # BLD: 8 K/UL (ref 1.8–8)
NEUTS SEG NFR BLD AUTO: 57 % (ref 40–73)
NITRITE UR QL STRIP.AUTO: NEGATIVE
PH UR STRIP: 5.5 [PH] (ref 5–8)
PLATELET # BLD AUTO: 304 K/UL (ref 135–420)
PMV BLD AUTO: 9.8 FL (ref 9.2–11.8)
POTASSIUM SERPL-SCNC: 4.6 MMOL/L (ref 3.5–5.5)
PROT SERPL-MCNC: 7.2 G/DL (ref 6.4–8.2)
PROT UR STRIP-MCNC: NEGATIVE MG/DL
RBC # BLD AUTO: 4.66 M/UL (ref 4.2–5.3)
SODIUM SERPL-SCNC: 135 MMOL/L (ref 136–145)
SP GR UR REFRACTOMETRY: >1.03 (ref 1–1.03)
SPECIMEN EXP DATE BLD: NORMAL
UROBILINOGEN UR QL STRIP.AUTO: 1 EU/DL (ref 0.2–1)
WBC # BLD AUTO: 14 K/UL (ref 4.6–13.2)

## 2017-03-30 PROCEDURE — 81025 URINE PREGNANCY TEST: CPT

## 2017-03-30 PROCEDURE — 77030005537 HC CATH URETH BARD -A: Performed by: OBSTETRICS & GYNECOLOGY

## 2017-03-30 PROCEDURE — 76060000031 HC ANESTHESIA FIRST 0.5 HR: Performed by: OBSTETRICS & GYNECOLOGY

## 2017-03-30 PROCEDURE — 84703 CHORIONIC GONADOTROPIN ASSAY: CPT | Performed by: OBSTETRICS & GYNECOLOGY

## 2017-03-30 PROCEDURE — 77030018842 HC SOL IRR SOD CL 9% BAXT -A: Performed by: OBSTETRICS & GYNECOLOGY

## 2017-03-30 PROCEDURE — 74011000250 HC RX REV CODE- 250

## 2017-03-30 PROCEDURE — 77030010509 HC AIRWY LMA MSK TELE -A: Performed by: ANESTHESIOLOGY

## 2017-03-30 PROCEDURE — 74011250636 HC RX REV CODE- 250/636: Performed by: OBSTETRICS & GYNECOLOGY

## 2017-03-30 PROCEDURE — 88305 TISSUE EXAM BY PATHOLOGIST: CPT | Performed by: OBSTETRICS & GYNECOLOGY

## 2017-03-30 PROCEDURE — 77030020782 HC GWN BAIR PAWS FLX 3M -B: Performed by: OBSTETRICS & GYNECOLOGY

## 2017-03-30 PROCEDURE — 81003 URINALYSIS AUTO W/O SCOPE: CPT | Performed by: OBSTETRICS & GYNECOLOGY

## 2017-03-30 PROCEDURE — 74011250636 HC RX REV CODE- 250/636: Performed by: NURSE ANESTHETIST, CERTIFIED REGISTERED

## 2017-03-30 PROCEDURE — 85025 COMPLETE CBC W/AUTO DIFF WBC: CPT | Performed by: OBSTETRICS & GYNECOLOGY

## 2017-03-30 PROCEDURE — 74011636637 HC RX REV CODE- 636/637: Performed by: NURSE ANESTHETIST, CERTIFIED REGISTERED

## 2017-03-30 PROCEDURE — 82962 GLUCOSE BLOOD TEST: CPT

## 2017-03-30 PROCEDURE — 74011250637 HC RX REV CODE- 250/637: Performed by: NURSE ANESTHETIST, CERTIFIED REGISTERED

## 2017-03-30 PROCEDURE — 74011250636 HC RX REV CODE- 250/636

## 2017-03-30 PROCEDURE — 76010000154 HC OR TIME FIRST 0.5 HR: Performed by: OBSTETRICS & GYNECOLOGY

## 2017-03-30 PROCEDURE — 86900 BLOOD TYPING SEROLOGIC ABO: CPT | Performed by: OBSTETRICS & GYNECOLOGY

## 2017-03-30 PROCEDURE — 76210000006 HC OR PH I REC 0.5 TO 1 HR: Performed by: OBSTETRICS & GYNECOLOGY

## 2017-03-30 PROCEDURE — 76210000021 HC REC RM PH II 0.5 TO 1 HR: Performed by: OBSTETRICS & GYNECOLOGY

## 2017-03-30 PROCEDURE — 80053 COMPREHEN METABOLIC PANEL: CPT | Performed by: OBSTETRICS & GYNECOLOGY

## 2017-03-30 RX ORDER — DEXTROSE 50 % IN WATER (D50W) INTRAVENOUS SYRINGE
25-50 AS NEEDED
Status: DISCONTINUED | OUTPATIENT
Start: 2017-03-30 | End: 2017-03-30 | Stop reason: HOSPADM

## 2017-03-30 RX ORDER — INSULIN LISPRO 100 [IU]/ML
INJECTION, SOLUTION INTRAVENOUS; SUBCUTANEOUS ONCE
Status: COMPLETED | OUTPATIENT
Start: 2017-03-30 | End: 2017-03-30

## 2017-03-30 RX ORDER — HYDROMORPHONE HYDROCHLORIDE 2 MG/ML
0.5 INJECTION, SOLUTION INTRAMUSCULAR; INTRAVENOUS; SUBCUTANEOUS
Status: DISCONTINUED | OUTPATIENT
Start: 2017-03-30 | End: 2017-03-30 | Stop reason: HOSPADM

## 2017-03-30 RX ORDER — SODIUM CHLORIDE, SODIUM LACTATE, POTASSIUM CHLORIDE, CALCIUM CHLORIDE 600; 310; 30; 20 MG/100ML; MG/100ML; MG/100ML; MG/100ML
50 INJECTION, SOLUTION INTRAVENOUS CONTINUOUS
Status: DISCONTINUED | OUTPATIENT
Start: 2017-03-30 | End: 2017-03-30 | Stop reason: HOSPADM

## 2017-03-30 RX ORDER — FENTANYL CITRATE 50 UG/ML
INJECTION, SOLUTION INTRAMUSCULAR; INTRAVENOUS AS NEEDED
Status: DISCONTINUED | OUTPATIENT
Start: 2017-03-30 | End: 2017-03-30 | Stop reason: HOSPADM

## 2017-03-30 RX ORDER — DIPHENHYDRAMINE HYDROCHLORIDE 50 MG/ML
12.5 INJECTION, SOLUTION INTRAMUSCULAR; INTRAVENOUS
Status: DISCONTINUED | OUTPATIENT
Start: 2017-03-30 | End: 2017-03-30 | Stop reason: HOSPADM

## 2017-03-30 RX ORDER — ONDANSETRON 2 MG/ML
INJECTION INTRAMUSCULAR; INTRAVENOUS AS NEEDED
Status: DISCONTINUED | OUTPATIENT
Start: 2017-03-30 | End: 2017-03-30 | Stop reason: HOSPADM

## 2017-03-30 RX ORDER — OXYCODONE AND ACETAMINOPHEN 5; 325 MG/1; MG/1
1 TABLET ORAL
Status: DISCONTINUED | OUTPATIENT
Start: 2017-03-30 | End: 2017-03-30 | Stop reason: HOSPADM

## 2017-03-30 RX ORDER — LIDOCAINE HYDROCHLORIDE 10 MG/ML
0.1 INJECTION, SOLUTION EPIDURAL; INFILTRATION; INTRACAUDAL; PERINEURAL AS NEEDED
Status: DISCONTINUED | OUTPATIENT
Start: 2017-03-30 | End: 2017-03-30 | Stop reason: HOSPADM

## 2017-03-30 RX ORDER — MIDAZOLAM HYDROCHLORIDE 1 MG/ML
INJECTION, SOLUTION INTRAMUSCULAR; INTRAVENOUS AS NEEDED
Status: DISCONTINUED | OUTPATIENT
Start: 2017-03-30 | End: 2017-03-30 | Stop reason: HOSPADM

## 2017-03-30 RX ORDER — HYDROMORPHONE HYDROCHLORIDE 2 MG/ML
INJECTION, SOLUTION INTRAMUSCULAR; INTRAVENOUS; SUBCUTANEOUS
Status: COMPLETED
Start: 2017-03-30 | End: 2017-03-30

## 2017-03-30 RX ORDER — OXYCODONE AND ACETAMINOPHEN 5; 325 MG/1; MG/1
2 TABLET ORAL
Qty: 30 TAB | Refills: 0 | Status: SHIPPED | OUTPATIENT
Start: 2017-03-30 | End: 2017-04-16

## 2017-03-30 RX ORDER — SODIUM CHLORIDE, SODIUM LACTATE, POTASSIUM CHLORIDE, CALCIUM CHLORIDE 600; 310; 30; 20 MG/100ML; MG/100ML; MG/100ML; MG/100ML
75 INJECTION, SOLUTION INTRAVENOUS CONTINUOUS
Status: DISCONTINUED | OUTPATIENT
Start: 2017-03-30 | End: 2017-03-30 | Stop reason: HOSPADM

## 2017-03-30 RX ORDER — SODIUM CHLORIDE 0.9 % (FLUSH) 0.9 %
5-10 SYRINGE (ML) INJECTION AS NEEDED
Status: DISCONTINUED | OUTPATIENT
Start: 2017-03-30 | End: 2017-03-30 | Stop reason: HOSPADM

## 2017-03-30 RX ORDER — DEXAMETHASONE SODIUM PHOSPHATE 4 MG/ML
INJECTION, SOLUTION INTRA-ARTICULAR; INTRALESIONAL; INTRAMUSCULAR; INTRAVENOUS; SOFT TISSUE AS NEEDED
Status: DISCONTINUED | OUTPATIENT
Start: 2017-03-30 | End: 2017-03-30 | Stop reason: HOSPADM

## 2017-03-30 RX ORDER — FAMOTIDINE 20 MG/1
20 TABLET, FILM COATED ORAL ONCE
Status: COMPLETED | OUTPATIENT
Start: 2017-03-30 | End: 2017-03-30

## 2017-03-30 RX ORDER — IBUPROFEN 800 MG/1
800 TABLET ORAL
Qty: 30 TAB | Refills: 1 | Status: SHIPPED | OUTPATIENT
Start: 2017-03-30 | End: 2017-04-16

## 2017-03-30 RX ORDER — ONDANSETRON 2 MG/ML
4 INJECTION INTRAMUSCULAR; INTRAVENOUS ONCE
Status: COMPLETED | OUTPATIENT
Start: 2017-03-30 | End: 2017-03-30

## 2017-03-30 RX ORDER — PROPOFOL 10 MG/ML
INJECTION, EMULSION INTRAVENOUS AS NEEDED
Status: DISCONTINUED | OUTPATIENT
Start: 2017-03-30 | End: 2017-03-30 | Stop reason: HOSPADM

## 2017-03-30 RX ORDER — CEFAZOLIN SODIUM 2 G/50ML
2 SOLUTION INTRAVENOUS ONCE
Status: DISCONTINUED | OUTPATIENT
Start: 2017-03-30 | End: 2017-03-30 | Stop reason: SDUPTHER

## 2017-03-30 RX ORDER — LIDOCAINE HYDROCHLORIDE 20 MG/ML
INJECTION, SOLUTION EPIDURAL; INFILTRATION; INTRACAUDAL; PERINEURAL AS NEEDED
Status: DISCONTINUED | OUTPATIENT
Start: 2017-03-30 | End: 2017-03-30 | Stop reason: HOSPADM

## 2017-03-30 RX ORDER — MAGNESIUM SULFATE 100 %
4 CRYSTALS MISCELLANEOUS AS NEEDED
Status: DISCONTINUED | OUTPATIENT
Start: 2017-03-30 | End: 2017-03-30 | Stop reason: HOSPADM

## 2017-03-30 RX ORDER — ONDANSETRON 2 MG/ML
INJECTION INTRAMUSCULAR; INTRAVENOUS
Status: COMPLETED
Start: 2017-03-30 | End: 2017-03-30

## 2017-03-30 RX ADMIN — INSULIN LISPRO 9 UNITS: 100 INJECTION, SOLUTION INTRAVENOUS; SUBCUTANEOUS at 13:02

## 2017-03-30 RX ADMIN — INSULIN LISPRO 6 UNITS: 100 INJECTION, SOLUTION INTRAVENOUS; SUBCUTANEOUS at 15:00

## 2017-03-30 RX ADMIN — ONDANSETRON 4 MG: 2 INJECTION INTRAMUSCULAR; INTRAVENOUS at 14:42

## 2017-03-30 RX ADMIN — MIDAZOLAM HYDROCHLORIDE 2 MG: 1 INJECTION, SOLUTION INTRAMUSCULAR; INTRAVENOUS at 14:00

## 2017-03-30 RX ADMIN — PROPOFOL 200 MG: 10 INJECTION, EMULSION INTRAVENOUS at 14:02

## 2017-03-30 RX ADMIN — LIDOCAINE HYDROCHLORIDE 20 MG: 20 INJECTION, SOLUTION EPIDURAL; INFILTRATION; INTRACAUDAL; PERINEURAL at 14:02

## 2017-03-30 RX ADMIN — Medication 3 G: at 14:00

## 2017-03-30 RX ADMIN — FAMOTIDINE 20 MG: 20 TABLET, FILM COATED ORAL at 13:02

## 2017-03-30 RX ADMIN — ONDANSETRON 4 MG: 2 INJECTION INTRAMUSCULAR; INTRAVENOUS at 14:02

## 2017-03-30 RX ADMIN — ONDANSETRON HYDROCHLORIDE 4 MG: 2 SOLUTION INTRAMUSCULAR; INTRAVENOUS at 14:42

## 2017-03-30 RX ADMIN — FENTANYL CITRATE 100 MCG: 50 INJECTION, SOLUTION INTRAMUSCULAR; INTRAVENOUS at 14:02

## 2017-03-30 RX ADMIN — HYDROMORPHONE HYDROCHLORIDE 0.5 MG: 2 INJECTION, SOLUTION INTRAMUSCULAR; INTRAVENOUS; SUBCUTANEOUS at 14:43

## 2017-03-30 RX ADMIN — SODIUM CHLORIDE, SODIUM LACTATE, POTASSIUM CHLORIDE, AND CALCIUM CHLORIDE 50 ML/HR: 600; 310; 30; 20 INJECTION, SOLUTION INTRAVENOUS at 13:02

## 2017-03-30 RX ADMIN — DEXAMETHASONE SODIUM PHOSPHATE 4 MG: 4 INJECTION, SOLUTION INTRA-ARTICULAR; INTRALESIONAL; INTRAMUSCULAR; INTRAVENOUS; SOFT TISSUE at 14:02

## 2017-03-30 NOTE — BRIEF OP NOTE
BRIEF OPERATIVE NOTE    Date of Procedure: 3/30/2017   Preoperative Diagnosis: Oligomenorrhea [N91.5]  PCOS (polycystic ovarian syndrome) [E28.2] DUB,  Postoperative Diagnosis: Oligomenorrhea [N91.5]  PCOS (polycystic ovarian syndrome) [E28.2]    Procedure(s): EUA,  DILATATION AND CURETTAGE  Surgeon(s) and Role:     * Araceli Caldera MD - Primary            Surgical Staff:  Circ-1: Rasheeda Meraz RN  Scrub RN-1: Claudia Mcbride  Event Time In   Incision Start 1418   Incision Close 1427     Anesthesia: General   Estimated Blood Loss: 20 cc  Specimens:   ID Type Source Tests Collected by Time Destination   1 : 150 Dalton Pratt MD 3/30/2017 1423 Pathology      Findings: --Retroflexed Enlarged Uterus and normal adnexa.   Complications: none  Implants: * No implants in log *

## 2017-03-30 NOTE — PERIOP NOTES
[address] March 30, 2017       RE: Alessandro Velezs      To Whom It May Concern,    This is to certify that Alessandro Contreras may may return to work on 4/2/17. Please feel free to contact my office if you have any questions or concerns. Thank you for your assistance in this matter.       Sincerely,  SAUNDRA Schmid Dr.   [phone]

## 2017-03-30 NOTE — ANESTHESIA PREPROCEDURE EVALUATION
Anesthetic History   No history of anesthetic complications            Review of Systems / Medical History  Patient summary reviewed and pertinent labs reviewed    Pulmonary  Within defined limits                 Neuro/Psych   Within defined limits           Cardiovascular    Hypertension: well controlled              Exercise tolerance: >4 METS     GI/Hepatic/Renal  Within defined limits              Endo/Other    Diabetes: well controlled, type 2    Morbid obesity     Other Findings   Comments:   Risk Factors for Postoperative nausea/vomiting:       History of postoperative nausea/vomiting? NO       Female? YES       Motion sickness? NO       Intended opioid administration for postoperative analgesia?   YES           Physical Exam    Airway  Mallampati: II  TM Distance: 4 - 6 cm  Neck ROM: normal range of motion   Mouth opening: Normal     Cardiovascular    Rhythm: regular  Rate: normal         Dental  No notable dental hx       Pulmonary  Breath sounds clear to auscultation               Abdominal  GI exam deferred       Other Findings            Anesthetic Plan    ASA: 3  Anesthesia type: general          Induction: Intravenous  Anesthetic plan and risks discussed with: Patient

## 2017-03-30 NOTE — ANESTHESIA POSTPROCEDURE EVALUATION
Post-Anesthesia Evaluation and Assessment    Patient: Hima Gallegos MRN: [id number]  SSN: [id number]    YOB: 1985  Age: 27 y.o. Sex: female      Data from PACU flowsheet    Cardiovascular Function/Vital Signs  Visit Vitals    /76    Pulse 73    Temp 36.6 °C (97.9 °F)    Resp 15    Ht 5' 10\" (1.778 m)    Wt 125.6 kg (277 lb)    SpO2 100%    BMI 39.75 kg/m2       Patient is status post general anesthesia for Procedure(s):  DILATATION AND CURETTAGE. Nausea/Vomiting: controlled    Postoperative hydration reviewed and adequate. Pain:  Pain Scale 1: Numeric (0 - 10) (03/30/17 1429)  Pain Intensity 1: 8 (03/30/17 1429)   Managed      Mental Status and Level of Consciousness: Alert and oriented     Pulmonary Status:   O2 Device: Room air (03/30/17 1429)   Adequate oxygenation and airway patent    Complications related to anesthesia: None    Post-anesthesia assessment completed.  No concerns    Signed By: Norberto Brown MD     March 30, 2017

## 2017-03-30 NOTE — H&P
Daily Progress Note    Patient: Sebastián Pickett  MRN: [id number]  Date: 3/30/2017     Age:  27 y.o.,      Sex: female    YOB: 1985    Sebastián Pickett is a 27y.o. year-old female, G 1 P 0  whose last normal menstrual period is irregular, with oligomenorrhea and abnormal heavy menses with menorrhagia . She is not on any contraceptives. She is scheduled today for EUA and D&C. Review of Systems: General, Cardiovascular, Respiratory, Gastrointestinal, Genitourinary, Neuropsychiatry, Musculoskeletal. Patient denies any problems associated with these systems except for the above problems. General Examination: She is a well-developed, well-nourished female in no acute distress. HEENT--all within normal limits. Lungs--clear to A&P. Cardiovascular system--normal sinus rhythm, no murmurs or gallop. Abdomen--soft, nontender, and normal active. Pelvic exam--External genitalia and BUS--normal.             Cervix: Normal    Vagina: vaginal discharge normal and physiologic                          Uterus: normal size, contour, position, consistency, mobility, non-tender    Adnexa: normal bimanual exam    Impression. ---Oligomenorrhea. Dysmenorrhea. DUB.                            PCOS. Plan: --EUA, D&C.     Anup Hansen MD  March 30, 2017

## 2017-03-30 NOTE — ROUTINE PROCESS
I have reviewed discharge instructions with the patient and spouse. The patient and spouse verbalized understanding. IV catheter discontinued intact. Site without signs and symptoms of complications. Dressing and pressure applied. Patient armband removed and shredded.

## 2017-03-30 NOTE — DISCHARGE SUMMARY
Discharge Summary     Name: Simone Valdes MRN: [id number]  SSN: [id number]    YOB: 1985  Age: 27 y.o. Sex: female      Admit Date: 3/30/2017    Discharge Date: 3/30/2017      Admitting Physician: Ramakrishna Landry MD     * Admission Diagnoses: Abnormal uterine bleeding, Dysmenorrhea and Oligomenorrhea. * Discharge Diagnoses:   Hospital Problems as of 3/30/2017  Date Reviewed: 3/15/2017    None           * Procedures: EUA, D&C    * Discharge Condition: [facility] Course: Normal hospital course for this procedure. Significant Diagnostic Studies:   Recent Results (from the past 24 hour(s))   HCG QL SERUM    Collection Time: 03/30/17 12:16 PM   Result Value Ref Range    HCG, Ql. NEGATIVE  NEG     HCG URINE, QL. - POC    Collection Time: 03/30/17 12:24 PM   Result Value Ref Range    Pregnancy test,urine (POC) NEGATIVE  NEG     GLUCOSE, POC    Collection Time: 03/30/17 12:26 PM   Result Value Ref Range    Glucose (POC) 280 (H) 70 - 110 mg/dL   HCG URINE, QL    Collection Time: 03/30/17 12:51 PM   Result Value Ref Range    HCG urine, Ql. NEGATIVE  NEG     CBC WITH AUTOMATED DIFF    Collection Time: 03/30/17 12:51 PM   Result Value Ref Range    WBC 14.0 (H) 4.6 - 13.2 K/uL    RBC 4.66 4.20 - 5.30 M/uL    HGB 12.9 12.0 - 16.0 g/dL    HCT 38.9 35.0 - 45.0 %    MCV 83.5 74.0 - 97.0 FL    MCH 27.7 24.0 - 34.0 PG    MCHC 33.2 31.0 - 37.0 g/dL    RDW 13.4 11.6 - 14.5 %    PLATELET 093 652 - 694 K/uL    MPV 9.8 9.2 - 11.8 FL    NEUTROPHILS 57 40 - 73 %    LYMPHOCYTES 34 21 - 52 %    MONOCYTES 5 3 - 10 %    EOSINOPHILS 4 0 - 5 %    BASOPHILS 0 0 - 2 %    ABS. NEUTROPHILS 8.0 1.8 - 8.0 K/UL    ABS. LYMPHOCYTES 4.7 (H) 0.9 - 3.6 K/UL    ABS. MONOCYTES 0.8 0.05 - 1.2 K/UL    ABS. EOSINOPHILS 0.5 (H) 0.0 - 0.4 K/UL    ABS.  BASOPHILS 0.1 (H) 0.0 - 0.06 K/UL    DF AUTOMATED     METABOLIC PANEL, COMPREHENSIVE    Collection Time: 03/30/17 12:51 PM   Result Value Ref Range    Sodium 135 (L) 136 - 145 mmol/L    Potassium 4.6 3.5 - 5.5 mmol/L    Chloride 99 (L) 100 - 108 mmol/L    CO2 31 21 - 32 mmol/L    Anion gap 5 3.0 - 18 mmol/L    Glucose 283 (H) 74 - 99 mg/dL    BUN 10 7.0 - 18 MG/DL    Creatinine 0.81 0.6 - 1.3 MG/DL    BUN/Creatinine ratio 12 12 - 20      GFR est AA >60 >60 ml/min/1.73m2    GFR est non-AA >60 >60 ml/min/1.73m2    Calcium 9.5 8.5 - 10.1 MG/DL    Bilirubin, total 0.3 0.2 - 1.0 MG/DL    ALT (SGPT) 20 13 - 56 U/L    AST (SGOT) 7 (L) 15 - 37 U/L    Alk. phosphatase 107 45 - 117 U/L    Protein, total 7.2 6.4 - 8.2 g/dL    Albumin 4.0 3.4 - 5.0 g/dL    Globulin 3.2 2.0 - 4.0 g/dL    A-G Ratio 1.3 0.8 - 1.7     TYPE & SCREEN    Collection Time: 03/30/17 12:51 PM   Result Value Ref Range    Crossmatch Expiration 04/02/2017     ABO/Rh(D) O POSITIVE     Antibody screen NEG    URINALYSIS W/ RFLX MICROSCOPIC    Collection Time: 03/30/17 12:51 PM   Result Value Ref Range    Color YELLOW      Appearance CLEAR      Specific gravity >1.030 (H) 1.005 - 1.030    pH (UA) 5.5 5.0 - 8.0      Protein NEGATIVE  NEG mg/dL    Glucose 500 (A) NEG mg/dL    Ketone TRACE (A) NEG mg/dL    Bilirubin NEGATIVE  NEG      Blood NEGATIVE  NEG      Urobilinogen 1.0 0.2 - 1.0 EU/dL    Nitrites NEGATIVE  NEG      Leukocyte Esterase NEGATIVE  NEG         * Disposition: Home    Discharge Medications:   Current Discharge Medication List      START taking these medications    Details   oxyCODONE-acetaminophen (PERCOCET) 5-325 mg per tablet Take 2 Tabs by mouth every four (4) hours as needed for Pain. Max Daily Amount: 12 Tabs. Indications: Pain  Qty: 30 Tab, Refills: 0      ibuprofen (MOTRIN) 800 mg tablet Take 1 Tab by mouth every eight (8) hours as needed for Pain. Indications: Pain  Qty: 30 Tab, Refills: 1         CONTINUE these medications which have NOT CHANGED    Details   citalopram (CELEXA) 20 mg tablet Take 20 mg by mouth daily.       metFORMIN (GLUCOPHAGE) 1,000 mg tablet Take 1 tablet by mouth two (2) times daily (with meals). Qty: 60 tablet, Refills: 5      lisinopril-hydrochlorothiazide (PRINZIDE, ZESTORETIC) 10-12.5 mg per tablet Take 1 tablet by mouth daily. Qty: 30 tablet, Refills: 5              * Follow-up Care/Patient Instructions: Activity: No sex, douching, or tampons for 6 weeks or as directed by your physician. No heavy lifting for 6 weeks. No driving while taking pain medication.   Diet: Resume pre-hospital diet  Wound Care: None needed    Follow-up Information     Follow up With Details Comments [address], [place]   [address] [place] [address]  [phone]             Signed By:  Alphonso Bermudez MD     March 30, 2017

## 2017-03-30 NOTE — IP AVS SNAPSHOT
Current Discharge Medication List  
  
START taking these medications Dose & Instructions Dispensing Information Comments Morning Noon Evening Bedtime  
 ibuprofen 800 mg tablet Commonly known as:  MOTRIN Your last dose was: Your next dose is:    
   
   
 Dose:  800 mg Take 1 Tab by mouth every eight (8) hours as needed for Pain. Indications: Pain Quantity:  30 Tab Refills:  1  
     
   
   
   
  
 oxyCODONE-acetaminophen 5-325 mg per tablet Commonly known as:  PERCOCET Your last dose was: Your next dose is:    
   
   
 Dose:  2 Tab Take 2 Tabs by mouth every four (4) hours as needed for Pain. Max Daily Amount: 12 Tabs. Indications: Pain Quantity:  30 Tab Refills:  0 CONTINUE these medications which have NOT CHANGED Dose & Instructions Dispensing Information Comments Morning Noon Evening Bedtime CeleXA 20 mg tablet Generic drug:  citalopram  
   
Your last dose was: Your next dose is:    
   
   
 Dose:  20 mg Take 20 mg by mouth daily. Refills:  0  
     
   
   
   
  
 lisinopril-hydroCHLOROthiazide 10-12.5 mg per tablet Commonly known as:  Miguel Lopez Your last dose was: Your next dose is:    
   
   
 Dose:  1 Tab Take 1 tablet by mouth daily. Quantity:  30 tablet Refills:  5  
     
   
   
   
  
 metFORMIN 1,000 mg tablet Commonly known as:  GLUCOPHAGE Your last dose was: Your next dose is:    
   
   
 Dose:  1000 mg Take 1 tablet by mouth two (2) times daily (with meals). Quantity:  60 tablet Refills:  5 Where to Get Your Medications Information on where to get these meds will be given to you by the nurse or doctor. ! Ask your nurse or doctor about these medications  
  ibuprofen 800 mg tablet  
 oxyCODONE-acetaminophen 5-325 mg per tablet

## 2017-03-30 NOTE — DISCHARGE INSTRUCTIONS
Narcotic-Analgesic/Acetaminophen (By mouth)   Relieves pain. Brand Name(s):Capital w/Codeine, Endocet, Hycet, Lorcet, Lorcet HD, Lorcet Plus, Lortab 10/325, Lortab 5/325, Lortab 7.5/325, Lortab Elixir, Norco, Percocet, Pete, Roxicet, Trezix   There may be other brand names for this medicine. When This Medicine Should Not Be Used: You should not use this medicine if you have had an allergic reaction to acetaminophen, codeine, hydrocodone, propoxyphene, or sulfites. You should not use this medicine if you have had an allergic reaction to any other opioid pain medicine. How to Use This Medicine:   Liquid, Tablet, Capsule  · Your doctor will tell you how much medicine to use. Do not use more than directed. · This medicine contains acetaminophen. Read the labels of all other medicines you are using to see if they also contain acetaminophen, or ask your doctor or pharmacist. Alicia Givens not use more than 4 grams (4,000 milligrams) total of acetaminophen in one day. · Drink plenty of liquids to help avoid constipation. If a dose is missed:   · Some of these medicines need to be used on a fixed schedule. If you miss a dose or forget to use your medicine, call your doctor pharmacist for instructions. Do not use extra medicine to make up for a missed dose. How to Store and Dispose of This Medicine:   · Store the medicine in a closed container at room temperature, away from heat, moisture, and direct light. Do not refrigerate or freeze the medicine. · Ask your pharmacist, doctor, or health caregiver about the best way to dispose of any outdated medicine or medicine no longer needed. · Keep all medicine out of the reach of children. Never share your medicine with anyone. Drugs and Foods to Avoid:   Ask your doctor or pharmacist before using any other medicine, including over-the-counter medicines, vitamins, and herbal products.   · Make sure your doctor knows if you are using a monoamine oxidase inhibitor (MAOI) medicine, such as Eldepryl®, Marplan®, Nardil®, or Parnate®. Make sure your doctor knows if you are also using a medicine to treat depression, such as amitriptyline, doxepin, nortriptyline, Elavil®, Pamelor®, or Sinequan®. Make sure your doctor knows if you are taking an anticholinergic medicine, such as atropine, methscopolamine, or scopolamine. · Tell your doctor if you use anything else that makes you sleepy. Some examples are allergy medicine, narcotic pain medicine, and alcohol. · Do not drink alcohol while you are using this medicine. Warnings While Using This Medicine:   · Make sure your doctor knows if you are pregnant or breast feeding, or if you have a head injury, or other conditions that may cause an increase in intercranial pressure (pressure inside your head). Make sure your doctor knows if you have severe kidney problems or severe liver problems, or if you have hypothyroidism (lack of thyroid function). Make sure your doctor knows if you have Boyd's disease (adrenal gland disease), or if you have enlarged prostate or urethral stricture. Make sure your doctor knows if you have any abdominal problems, or if you have lung disease or asthma. · This medicine may make you dizzy or drowsy. Avoid driving, using machines, or anything else that could be dangerous if you are not alert. · This medicine can be habit-forming. Do not use more than your prescribed dose. Call your doctor if you think your medicine is not working. · Tell any doctor or dentist who treats you that you are using this medicine. This medicine may affect certain medical test results. · This medicine may cause constipation, especially with long-term use. Ask your doctor if you should use a laxative to prevent and treat constipation. · When a mother is breastfeeding and takes codeine, there is a very small chance that this medicine could cause serious side effects in the baby.  This is because codeine works differently in a few women, so their breast milk contains too much medicine. If you take codeine, be alert for these signs of overdose in your nursing baby: sleeping more than usual, trouble breastfeeding, trouble breathing, or being limp and weak. Call the baby's doctor right away if you think there is a problem. If you cannot talk to the doctor, take the baby to the emergency room or call 911. Possible Side Effects While Using This Medicine:   Call your doctor right away if you notice any of these side effects:  · Allergic reaction: Itching or hives, swelling in your face or hands, swelling or tingling in your mouth or throat, chest tightness, trouble breathing  · Dizziness. · Seeing or hearing things that are not there. · Very slow heartbeat. If you notice these less serious side effects, talk with your doctor:   · Change in how much or how often you urinate. · Cold, clammy skin. · Feeling unusually anxious, excited, fearful, or tired. · Nausea, vomiting, constipation, stomach pain or upset, or heartburn. · Skin rash. · Vision changes. If you notice other side effects that you think are caused by this medicine, tell your doctor. Call your doctor for medical advice about side effects. You may report side effects to FDA at 8-889-FDA-6205  © 2016 [address] is for End User's use only and may not be sold, redistributed or otherwise used for commercial purposes. The above information is an  only. It is not intended as medical advice for individual conditions or treatments. Talk to your doctor, nurse or pharmacist before following any medical regimen to see if it is safe and effective for you. Ibuprofen (By injection)   Ibuprofen (eye-bue-PROE-fen)  Treats fever and pain. This medicine is an NSAID. Brand Name(s):Caldolor, Neoprofen   There may be other brand names for this medicine. When This Medicine Should Not Be Used: This medicine is not right for everyone.  You should not receive it if you had an allergic reaction (including asthma) to ibuprofen, aspirin, or another NSAID. How to Use This Medicine:   Injectable  · Your doctor will prescribe your dose and schedule. This medicine is given through a needle placed in a vein. · A nurse or other health provider will give you this medicine. Drugs and Foods to Avoid:   Ask your doctor or pharmacist before using any other medicine, including over-the-counter medicines, vitamins, and herbal products. · Some foods and medicines can affect how ibuprofen works. Tell your doctor if you are using any of the following:  ¨ Aspirin, cyclosporine, digoxin, lithium, methotrexate, pemetrexed  ¨ Blood thinner (including warfarin)  ¨ Blood pressure medicine  ¨ Diuretic (water pill)  ¨ Medicine to treat depression  ¨ Corticosteroid (including dexamethasone, hydrocortisone, methylprednisolone, prednisolone, prednisone)  Warnings While Using This Medicine:   · Tell your doctor if you are pregnant or breastfeeding. Do not use this medicine during the later part of pregnancy. · Tell your doctor if you have kidney disease, liver disease, heart disease, recent heart attack, heart failure, high blood pressure, asthma, bleeding problems, or a history of ulcers or other digestive problems. Tell your doctor if you drink alcohol or smoke. · This medicine may cause the following problems:   ¨ Higher risk of heart attack or stroke  ¨ Higher risk of bleeding problems, including stomach ulcer or bleeding  ¨ Liver damage  ¨ High blood pressure  ¨ Kidney damage  ¨ Higher levels of potassium in the blood  ¨ Serious skin reactions  .   Possible Side Effects While Using This Medicine:   Call your doctor right away if you notice any of these side effects:  · Allergic reaction: Itching or hives, swelling in your face or hands, swelling or tingling in your mouth or throat, chest tightness, trouble breathing  · Blistering, peeling, or red skin rash  · Change in how much or how often you urinate  · Chest pain that may spread, trouble breathing, nausea, unusual sweating, fainting  · Confusion, weakness, uneven heartbeat, trouble breathing, numbness or tingling in your hands, feet, or lips  · Dark urine or pale stools, nausea, vomiting, loss of appetite, stomach pain, yellow skin or eyes  · Numbness or weakness on one side of your body, sudden or severe headache, problems with vision, speech, or walking  · Rapid weight gain, swelling in your hands, ankles, or feet  · Severe stomach pain, vomiting blood, bloody or black, tarry stools  · Unusual bleeding, bruising, or weakness  If you notice other side effects that you think are caused by this medicine, tell your doctor. Call your doctor for medical advice about side effects. You may report side effects to FDA at 1-250-FDA-6939  © 2016 [address] is for End User's use only and may not be sold, redistributed or otherwise used for commercial purposes. The above information is an  only. It is not intended as medical advice for individual conditions or treatments. Talk to your doctor, nurse or pharmacist before following any medical regimen to see if it is safe and effective for you. Dilation and Curettage: What to Expect at Home  Your Recovery  Dilation and curettage (D&C) is a procedure to remove tissue from the inside of the uterus. The doctor used a curved tool, called a curette, to gently scrape tissue from your uterus. You are likely to have a backache, or cramps similar to menstrual cramps, and pass small clots of blood from your vagina for the first few days. You may continue to have light vaginal bleeding for several weeks after the procedure. You will probably be able to go back to most of your normal activities in 1 or 2 days. This care sheet gives you a general idea about how long it will take for you to recover. But each person recovers at a different pace.  Follow the steps below to get better as quickly as possible. How can you care for yourself at home? Activity  · Rest when you feel tired. Getting enough sleep will help you recover. · Avoid strenuous activities, such as bicycle riding, jogging, weight lifting, or aerobic exercise, until your doctor says it is okay. · Most women are able to return to work the day after the procedure. · You may have some light vaginal bleeding. Wear sanitary pads if needed. Do not douche or use tampons for 2 weeks or until your doctor says it is okay. · Ask your doctor when it is okay for you to have sex. · If you could become pregnant, talk about birth control with your doctor. Do not try to become pregnant until your doctor says it is okay. Diet  · You can eat your normal diet. If your stomach is upset, try bland, low-fat foods like plain rice, broiled chicken, toast, and yogurt. · Drink plenty of fluids (unless your doctor tells you not to). Medicines  · Your doctor will tell you if and when you can restart your medicines. He or she will also give you instructions about taking any new medicines. · If you take blood thinners, such as warfarin (Coumadin), clopidogrel (Plavix), or aspirin, be sure to talk to your doctor. He or she will tell you if and when to start taking those medicines again. Make sure that you understand exactly what your doctor wants you to do. · Be safe with medicines. Take pain medicines exactly as directed. ¨ If the doctor gave you a prescription medicine for pain, take it as prescribed. ¨ If you are not taking a prescription pain medicine, ask your doctor if you can take an over-the-counter medicine. · If you think your pain medicine is making you sick to your stomach:  ¨ Take your medicine after meals (unless your doctor has told you not to). ¨ Ask your doctor for a different pain medicine. · If your doctor prescribed antibiotics, take them as directed.  Do not stop taking them just because you feel better. You need to take the full course of antibiotics. Follow-up care is a key part of your treatment and safety. Be sure to make and go to all appointments, and call your doctor if you are having problems. It's also a good idea to know your test results and keep a list of the medicines you take. When should you call for help? Call 911 anytime you think you may need emergency care. For example, call if:  · You passed out (lost consciousness). · You have severe trouble breathing. · You have chest pain and shortness of breath, or you cough up blood. · You have severe pain in your belly. Call your doctor now or seek immediate medical care if:  · You have bright red vaginal bleeding that soaks one or more pads each hour for 2 or more hours. · You pass blood clots that are larger than a golf ball. · You have vaginal discharge that smells bad. · You are sick to your stomach or cannot keep fluids down. · You have pain that does not get better after you take pain medicine. · You have pain that is getting worse 2 days after the procedure. · You have a fever over 100°F.  · Your belly feels tender, or full and hard. Watch closely for changes in your health, and be sure to contact your doctor if:  · You do not get better as expected. Where can you learn more? Go to http://león-irving.info/. Enter [phone] in the search box to learn more about \"Dilation and Curettage: What to Expect at Home. \"  Current as of: May 30, 2016  Content Version: 11.2  © 6674-0702 [employer]. Care instructions adapted under license by [employer] (which disclaims liability or warranty for this information). If you have questions about a medical condition or this instruction, always ask your healthcare professional. [address] any warranty or liability for your use of this information.     DISCHARGE SUMMARY from Nurse    The following personal items are in your possession at time of discharge:    Dental Appliances: None  Visual Aid: None (none with pt.)     Home Medications: None  Jewelry: None  Clothing: Pants, Shirt, Footwear, Socks, Undergarments  Other Valuables: None             PATIENT INSTRUCTIONS:    After general anesthesia or intravenous sedation, for 24 hours or while taking prescription Narcotics:  · Limit your activities  · Do not drive and operate hazardous machinery  · Do not make important personal or business decisions  · Do  not drink alcoholic beverages  · If you have not urinated within 8 hours after discharge, please contact your surgeon on call. Report the following to your surgeon:  · Excessive pain, swelling, redness or odor of or around the surgical area  · Temperature over 100.5  · Nausea and vomiting lasting longer than 4 hours or if unable to take medications  · Any signs of decreased circulation or nerve impairment to extremity: change in color, persistent  numbness, tingling, coldness or increase pain  · Any questions        What to do at Home:        *  Please give a list of your current medications to your Primary Care Provider. *  Please update this list whenever your medications are discontinued, doses are      changed, or new medications (including over-the-counter products) are added. *  Please carry medication information at all times in case of emergency situations. These are general instructions for a healthy lifestyle:    No smoking/ No tobacco products/ Avoid exposure to second hand smoke    Surgeon General's Warning:  Quitting smoking now greatly reduces serious risk to your health.     Obesity, smoking, and sedentary lifestyle greatly increases your risk for illness    A healthy diet, regular physical exercise & weight monitoring are important for maintaining a healthy lifestyle    You may be retaining fluid if you have a history of heart failure or if you experience any of the following symptoms:  Weight gain of 3 pounds or more overnight or 5 pounds in a week, increased swelling in our hands or feet or shortness of breath while lying flat in bed. Please call your doctor as soon as you notice any of these symptoms; do not wait until your next office visit. Recognize signs and symptoms of STROKE:    F-face looks uneven    A-arms unable to move or move unevenly    S-speech slurred or non-existent    T-time-call 911 as soon as signs and symptoms begin-DO NOT go       Back to bed or wait to see if you get better-TIME IS BRAIN. Warning Signs of HEART ATTACK     Call 911 if you have these symptoms:   Chest discomfort. Most heart attacks involve discomfort in the center of the chest that lasts more than a few minutes, or that goes away and comes back. It can feel like uncomfortable pressure, squeezing, fullness, or pain.  Discomfort in other areas of the upper body. Symptoms can include pain or discomfort in one or both arms, the back, neck, jaw, or stomach.  Shortness of breath with or without chest discomfort.  Other signs may include breaking out in a cold sweat, nausea, or lightheadedness. Don't wait more than five minutes to call 911 - MINUTES MATTER! Fast action can save your life. Calling 911 is almost always the fastest way to get lifesaving treatment. Emergency Medical Services staff can begin treatment when they arrive -- up to an hour sooner than if someone gets to the hospital by car. The discharge information has been reviewed with the patient and spouse. The patient and spouse verbalized understanding. Discharge medications reviewed with the patient and spouse and appropriate educational materials and side effects teaching were provided.

## 2017-03-30 NOTE — IP AVS SNAPSHOT
[facility] 
 
 
 [address] [id number] 
[phone] Patient: Anselmo Hernandez MRN: [id number] HLP:[id number] You are allergic to the following No active allergies Recent Documentation Height Weight BMI OB Status Smoking Status 1.778 m 125.6 kg 39.75 kg/m2 Having regular periods Never Smoker Emergency Contacts Name Discharge Info Relation Home Work Mobile [address] CAREGIVER [3] Spouse [3]   [phone] About your hospitalization You were admitted on:  March 30, 2017 You last received care in the:  [facility] PAC[facility] You were discharged on:  March 30, 2017 Unit phone number:  [phone] Why you were hospitalized Your primary diagnosis was:  Not on File Providers Seen During Your Hospitalizations Provider Role Specialty Primary office phone Simone Hirsch MD Attending Provider Obstetrics & Gynecology [phone] Your Primary Care Physician (PCP) Primary Care Physician Office Phone Office Fax [address],[facility], [address] [place] [address] [phone] Follow-up Information Follow up With Details Comments Contact Info Cierra Green MD   [address] [place] [address] [phone] Simone Hirsch MD Schedule an appointment as soon as possible for a visit in 2 week(s) F/U in two weeks [address] [facility] [address] [phone] Your Appointments Wednesday April 12, 2017  2:30 PM EDT NUTRITION COUNSELING with TSS NUTRI VISIT KATELYN [facility] Jordyn Spears ([address])  
 [address] [place] [address] [address] [facility]  
[phone] Current Discharge Medication List  
  
START taking these medications Dose & Instructions Dispensing Information Comments Morning Noon Evening Bedtime  
 ibuprofen 800 mg tablet Commonly known as:  MOTRIN Your last dose was: Your next dose is:    
   
   
 Dose:  800 mg Take 1 Tab by mouth every eight (8) hours as needed for Pain. Indications: Pain Quantity:  30 Tab Refills:  1  
     
   
   
   
  
 oxyCODONE-acetaminophen 5-325 mg per tablet Commonly known as:  PERCOCET Your last dose was: Your next dose is:    
   
   
 Dose:  2 Tab Take 2 Tabs by mouth every four (4) hours as needed for Pain. Max Daily Amount: 12 Tabs. Indications: Pain Quantity:  30 Tab Refills:  0 CONTINUE these medications which have NOT CHANGED Dose & Instructions Dispensing Information Comments Morning Noon Evening Bedtime CeleXA 20 mg tablet Generic drug:  citalopram  
   
Your last dose was: Your next dose is:    
   
   
 Dose:  20 mg Take 20 mg by mouth daily. Refills:  0  
     
   
   
   
  
 lisinopril-hydroCHLOROthiazide 10-12.5 mg per tablet Commonly known as:  Christ Adams Your last dose was: Your next dose is:    
   
   
 Dose:  1 Tab Take 1 tablet by mouth daily. Quantity:  30 tablet Refills:  5  
     
   
   
   
  
 metFORMIN 1,000 mg tablet Commonly known as:  GLUCOPHAGE Your last dose was: Your next dose is:    
   
   
 Dose:  1000 mg Take 1 tablet by mouth two (2) times daily (with meals). Quantity:  60 tablet Refills:  5 Where to Get Your Medications Information on where to get these meds will be given to you by the nurse or doctor. ! Ask your nurse or doctor about these medications  
  ibuprofen 800 mg tablet  
 oxyCODONE-acetaminophen 5-325 mg per tablet Discharge Instructions Narcotic-Analgesic/Acetaminophen (By mouth) Relieves pain.   
Brand Name(s):Capital w/Codeine, Endocet, Hycet, Lorcet, Lorcet HD, Lorcet Plus, Lortab 10/325, Lortab 5/325, Lortab 7.5/325, Lortab Elixir, Norco, Percocet, Pete, [place], Trezix There may be other brand names for this medicine. When This Medicine Should Not Be Used: You should not use this medicine if you have had an allergic reaction to acetaminophen, codeine, hydrocodone, propoxyphene, or sulfites. You should not use this medicine if you have had an allergic reaction to any other opioid pain medicine. How to Use This Medicine:  
Liquid, Tablet, Capsule · Your doctor will tell you how much medicine to use. Do not use more than directed. · This medicine contains acetaminophen. Read the labels of all other medicines you are using to see if they also contain acetaminophen, or ask your doctor or pharmacist. Sonam Taveras not use more than 4 grams (4,000 milligrams) total of acetaminophen in one day. · Drink plenty of liquids to help avoid constipation. If a dose is missed: · Some of these medicines need to be used on a fixed schedule. If you miss a dose or forget to use your medicine, call your doctor pharmacist for instructions. Do not use extra medicine to make up for a missed dose. How to Store and Dispose of This Medicine: · Store the medicine in a closed container at room temperature, away from heat, moisture, and direct light. Do not refrigerate or freeze the medicine. · Ask your pharmacist, doctor, or health caregiver about the best way to dispose of any outdated medicine or medicine no longer needed. · Keep all medicine out of the reach of children. Never share your medicine with anyone. Drugs and Foods to Avoid: Ask your doctor or pharmacist before using any other medicine, including over-the-counter medicines, vitamins, and herbal products. · Make sure your doctor knows if you are using a monoamine oxidase inhibitor (MAOI) medicine, such as Eldepryl®, Marplan®, [place], or Parnate®.  Make sure your doctor knows if you are also using a medicine to treat depression, such as amitriptyline, doxepin, nortriptyline, Elavil®, Pamelor®, or Sinequan®. Make sure your doctor knows if you are taking an anticholinergic medicine, such as atropine, methscopolamine, or scopolamine. · Tell your doctor if you use anything else that makes you sleepy. Some examples are allergy medicine, narcotic pain medicine, and alcohol. · Do not drink alcohol while you are using this medicine. Warnings While Using This Medicine: · Make sure your doctor knows if you are pregnant or breast feeding, or if you have a head injury, or other conditions that may cause an increase in intercranial pressure (pressure inside your head). Make sure your doctor knows if you have severe kidney problems or severe liver problems, or if you have hypothyroidism (lack of thyroid function). Make sure your doctor knows if you have Normangee's disease (adrenal gland disease), or if you have enlarged prostate or urethral stricture. Make sure your doctor knows if you have any abdominal problems, or if you have lung disease or asthma. · This medicine may make you dizzy or drowsy. Avoid driving, using machines, or anything else that could be dangerous if you are not alert. · This medicine can be habit-forming. Do not use more than your prescribed dose. Call your doctor if you think your medicine is not working. · Tell any doctor or dentist who treats you that you are using this medicine. This medicine may affect certain medical test results. · This medicine may cause constipation, especially with long-term use. Ask your doctor if you should use a laxative to prevent and treat constipation. · When a mother is breastfeeding and takes codeine, there is a very small chance that this medicine could cause serious side effects in the baby. This is because codeine works differently in a few women, so their breast milk contains too much medicine.  If you take codeine, be alert for these signs of overdose in your nursing baby: sleeping more than usual, trouble breastfeeding, trouble breathing, or being limp and weak. Call the baby's doctor right away if you think there is a problem. If you cannot talk to the doctor, take the baby to the emergency room or call 911. Possible Side Effects While Using This Medicine:  
Call your doctor right away if you notice any of these side effects: · Allergic reaction: Itching or hives, swelling in your face or hands, swelling or tingling in your mouth or throat, chest tightness, trouble breathing · Dizziness. · Seeing or hearing things that are not there. · Very slow heartbeat. If you notice these less serious side effects, talk with your doctor: · Change in how much or how often you urinate. · Cold, clammy skin. · Feeling unusually anxious, excited, fearful, or tired. · Nausea, vomiting, constipation, stomach pain or upset, or heartburn. · Skin rash. · Vision changes. If you notice other side effects that you think are caused by this medicine, tell your doctor. Call your doctor for medical advice about side effects. You may report side effects to FDA at 1-908-FDA-8597 © 2016 [address] is for End User's use only and may not be sold, redistributed or otherwise used for commercial purposes. The above information is an  only. It is not intended as medical advice for individual conditions or treatments. Talk to your doctor, nurse or pharmacist before following any medical regimen to see if it is safe and effective for you. Ibuprofen (By injection) Ibuprofen (eye-bue-PROE-fen) Treats fever and pain. This medicine is an NSAID. Brand Name(s):Caldolor, Neoprofen There may be other brand names for this medicine. When This Medicine Should Not Be Used: This medicine is not right for everyone.  You should not receive it if you had an allergic reaction (including asthma) to ibuprofen, aspirin, or another NSAID. How to Use This Medicine:  
Injectable · Your doctor will prescribe your dose and schedule. This medicine is given through a needle placed in a vein. · A nurse or other health provider will give you this medicine. Drugs and Foods to Avoid: Ask your doctor or pharmacist before using any other medicine, including over-the-counter medicines, vitamins, and herbal products. · Some foods and medicines can affect how ibuprofen works. Tell your doctor if you are using any of the following: ¨ Aspirin, cyclosporine, digoxin, lithium, methotrexate, pemetrexed ¨ Blood thinner (including warfarin) ¨ Blood pressure medicine ¨ Diuretic (water pill) ¨ Medicine to treat depression ¨ Corticosteroid (including dexamethasone, hydrocortisone, methylprednisolone, prednisolone, prednisone) Warnings While Using This Medicine: · Tell your doctor if you are pregnant or breastfeeding. Do not use this medicine during the later part of pregnancy. · Tell your doctor if you have kidney disease, liver disease, heart disease, recent heart attack, heart failure, high blood pressure, asthma, bleeding problems, or a history of ulcers or other digestive problems. Tell your doctor if you drink alcohol or smoke. · This medicine may cause the following problems:  
¨ Higher risk of heart attack or stroke ¨ Higher risk of bleeding problems, including stomach ulcer or bleeding ¨ Liver damage ¨ High blood pressure ¨ Kidney damage ¨ Higher levels of potassium in the blood ¨ Serious skin reactions [place] Possible Side Effects While Using This Medicine:  
Call your doctor right away if you notice any of these side effects: · Allergic reaction: Itching or hives, swelling in your face or hands, swelling or tingling in your mouth or throat, chest tightness, trouble breathing · Blistering, peeling, or red skin rash · Change in how much or how often you urinate · Chest pain that may spread, trouble breathing, nausea, unusual sweating, fainting · Confusion, weakness, uneven heartbeat, trouble breathing, numbness or tingling in your hands, feet, or lips · Dark urine or pale stools, nausea, vomiting, loss of appetite, stomach pain, yellow skin or eyes · Numbness or weakness on one side of your body, sudden or severe headache, problems with vision, speech, or walking · Rapid weight gain, swelling in your hands, ankles, or feet · Severe stomach pain, vomiting blood, bloody or black, tarry stools · Unusual bleeding, bruising, or weakness If you notice other side effects that you think are caused by this medicine, tell your doctor. Call your doctor for medical advice about side effects. You may report side effects to FDA at 4-946-ITR-5047 © 2016 [address] is for End User's use only and may not be sold, redistributed or otherwise used for commercial purposes. The above information is an  only. It is not intended as medical advice for individual conditions or treatments. Talk to your doctor, nurse or pharmacist before following any medical regimen to see if it is safe and effective for you. Dilation and Curettage: What to Expect at [facility] Your Recovery Dilation and curettage (D&C) is a procedure to remove tissue from the inside of the uterus. The doctor used a curved tool, called a curette, to gently scrape tissue from your uterus. You are likely to have a backache, or cramps similar to menstrual cramps, and pass small clots of blood from your vagina for the first few days. You may continue to have light vaginal bleeding for several weeks after the procedure. You will probably be able to go back to most of your normal activities in 1 or 2 days. This care sheet gives you a general idea about how long it will take for you to recover. But each person recovers at a different pace.  Follow the steps below to get better as quickly as possible. How can you care for yourself at home? Activity · Rest when you feel tired. Getting enough sleep will help you recover. · Avoid strenuous activities, such as bicycle riding, jogging, weight lifting, or aerobic exercise, until your doctor says it is okay. · Most women are able to return to work the day after the procedure. · You may have some light vaginal bleeding. Wear sanitary pads if needed. Do not douche or use tampons for 2 weeks or until your doctor says it is okay. · Ask your doctor when it is okay for you to have sex. · If you could become pregnant, talk about birth control with your doctor. Do not try to become pregnant until your doctor says it is okay. Diet · You can eat your normal diet. If your stomach is upset, try bland, low-fat foods like plain rice, broiled chicken, toast, and yogurt. · Drink plenty of fluids (unless your doctor tells you not to). Medicines · Your doctor will tell you if and when you can restart your medicines. He or she will also give you instructions about taking any new medicines. · If you take blood thinners, such as warfarin (Coumadin), clopidogrel (Plavix), or aspirin, be sure to talk to your doctor. He or she will tell you if and when to start taking those medicines again. Make sure that you understand exactly what your doctor wants you to do. · Be safe with medicines. Take pain medicines exactly as directed. ¨ If the doctor gave you a prescription medicine for pain, take it as prescribed. ¨ If you are not taking a prescription pain medicine, ask your doctor if you can take an over-the-counter medicine. · If you think your pain medicine is making you sick to your stomach: 
¨ Take your medicine after meals (unless your doctor has told you not to). ¨ Ask your doctor for a different pain medicine. · If your doctor prescribed antibiotics, take them as directed.  Do not stop taking them just because you feel better. You need to take the full course of antibiotics. Follow-up care is a key part of your treatment and safety. Be sure to make and go to all appointments, and call your doctor if you are having problems. It's also a good idea to know your test results and keep a list of the medicines you take. When should you call for help? Call 911 anytime you think you may need emergency care. For example, call if: 
· You passed out (lost consciousness). · You have severe trouble breathing. · You have chest pain and shortness of breath, or you cough up blood. · You have severe pain in your belly. Call your doctor now or seek immediate medical care if: 
· You have bright red vaginal bleeding that soaks one or more pads each hour for 2 or more hours. · You pass blood clots that are larger than a golf ball. · You have vaginal discharge that smells bad. · You are sick to your stomach or cannot keep fluids down. · You have pain that does not get better after you take pain medicine. · You have pain that is getting worse 2 days after the procedure. · You have a fever over 100°F. 
· Your belly feels tender, or full and hard. Watch closely for changes in your health, and be sure to contact your doctor if: 
· You do not get better as expected. Where can you learn more? Go to http://león-irving.info/. Enter [phone] in the search box to learn more about \"Dilation and Curettage: What to Expect at Home. \" Current as of: May 30, 2016 Content Version: 11.2 © 2012-2024 [employer]. Care instructions adapted under license by [employer] (which disclaims liability or warranty for this information). If you have questions about a medical condition or this instruction, always ask your healthcare professional. [address] any warranty or liability for your use of this information. DISCHARGE SUMMARY from Nurse The following personal items are in your possession at time of discharge: 
 
Dental Appliances: None Visual Aid: None (none with pt.) Home Medications: None Jewelry: None Clothing: Pants, Shirt, Footwear, Socks, Undergarments Other Valuables: None PATIENT INSTRUCTIONS: 
 
After general anesthesia or intravenous sedation, for 24 hours or while taking prescription Narcotics: · Limit your activities · Do not drive and operate hazardous machinery · Do not make important personal or business decisions · Do  not drink alcoholic beverages · If you have not urinated within 8 hours after discharge, please contact your surgeon on call. Report the following to your surgeon: 
· Excessive pain, swelling, redness or odor of or around the surgical area · Temperature over 100.5 · Nausea and vomiting lasting longer than 4 hours or if unable to take medications · Any signs of decreased circulation or nerve impairment to extremity: change in color, persistent  numbness, tingling, coldness or increase pain · Any questions What to do at Home: *  Please give a list of your current medications to your Primary Care Provider. *  Please update this list whenever your medications are discontinued, doses are 
    changed, or new medications (including over-the-counter products) are added. *  Please carry medication information at all times in case of emergency situations. These are general instructions for a healthy lifestyle: No smoking/ No tobacco products/ Avoid exposure to second hand smoke Surgeon General's Warning:  Quitting smoking now greatly reduces serious risk to your health. Obesity, smoking, and sedentary lifestyle greatly increases your risk for illness A healthy diet, regular physical exercise & weight monitoring are important for maintaining a healthy lifestyle You may be retaining fluid if you have a history of heart failure or if you experience any of the following symptoms:  Weight gain of 3 pounds or more overnight or 5 pounds in a week, increased swelling in our hands or feet or shortness of breath while lying flat in bed. Please call your doctor as soon as you notice any of these symptoms; do not wait until your next office visit. Recognize signs and symptoms of STROKE: 
 
F-face looks uneven A-arms unable to move or move unevenly S-speech slurred or non-existent T-time-call 911 as soon as signs and symptoms begin-DO NOT go Back to bed or wait to see if you get better-TIME IS BRAIN. Warning Signs of HEART ATTACK Call 911 if you have these symptoms: 
? Chest discomfort. Most heart attacks involve discomfort in the center of the chest that lasts more than a few minutes, or that goes away and comes back. It can feel like uncomfortable pressure, squeezing, fullness, or pain. ? Discomfort in other areas of the upper body. Symptoms can include pain or discomfort in one or both arms, the back, neck, jaw, or stomach. ? Shortness of breath with or without chest discomfort. ? Other signs may include breaking out in a cold sweat, nausea, or lightheadedness. Don't wait more than five minutes to call [address]! Fast action can save your life. Calling 911 is almost always the fastest way to get lifesaving treatment. Emergency Medical Services staff can begin treatment when they arrive  up to an hour sooner than if someone gets to the hospital by car. The discharge information has been reviewed with the patient and spouse. The patient and spouse verbalized understanding. Discharge medications reviewed with the patient and spouse and appropriate educational materials and side effects teaching were provided. Discharge Orders None [facility] Announcement  We are excited to announce that we are making your provider's discharge notes available to you in [employer]. You will see these notes when they are completed and signed by the physician that discharged you from your recent hospital stay. If you have any questions or concerns about any information you see in [employer], please call the Health Information Department where you were seen or reach out to your Primary Care Provider for more information about your plan of care. Introducing [facility]! Dear Shelbi aHnson: Thank you for requesting a [employer] account. Our records indicate that you already have an active [employer] account. You can access your account anytime at [email or web address] Did you know that you can access your hospital and ER discharge instructions at any time in [employer]? You can also review all of your test results from your hospital stay or ER visit. Additional Information If you have questions, please visit the Frequently Asked Questions section of the [employer] website at [email or web address] Remember, [employer] is NOT to be used for urgent needs. For medical emergencies, dial 911. Now available from your iPhone and Android! General Information Please provide this summary of care documentation to your next provider. Patient Signature:  ____________________________________________________________ Date:  ____________________________________________________________  
  
[facility] Provider Signature:  ____________________________________________________________ Date:  ____________________________________________________________

## 2017-03-31 NOTE — OP NOTES
FULL OP NOTE    Ian Caldera  [id number]  1985      DATE OF PROCEDURE:  3/30/2017    PREOPERATIVE DIAGNOSIS:  Oligomenorrhea [N91.5]  PCOS (polycystic ovarian syndrome) [E28.2]  DUB    POSTOPERATIVE DIAGNOSIS:  Oligomenorrhea [N91.5]   PCOS (polycystic ovarian syndrome) [E28.2] and  DUB. PROCEDURE:  EUA.& D&C.    SURGEON:  Josefina Whitney MD    ASSISTANT:     ANESTHESIA:general    EBL: Minimal    SPECIMENS: Endometrial Curettage. FINDINGS: --Minimal Endometrial Curettage. DESCRIPTION OF PROCEDURE:  After induction of anesthesia, the patient was placed in the dorsal lithotomy position and prepped and draped in the usual fashion for vaginal surgery. EUA--approximately 10 weeks size uterus and regular. Adnexa--normal and clear. Cervix was exposed with a weighted vaginal speculum and grasped with a single-tooth tenaculum. There uterus was sounded to 10 cm and the cervix was dilated to 29. Minimal curettage was obtained. The uterus was massaged. Hemostasis appeared normal, Instruments were removed. The patient went to the recovery room in satisfactory condition. All counts were correct times two.

## 2017-04-16 ENCOUNTER — HOSPITAL ENCOUNTER (EMERGENCY)
Age: 32
Discharge: HOME | End: 2017-04-16
Attending: EMERGENCY MEDICINE
Payer: COMMERCIAL

## 2017-04-16 VITALS
SYSTOLIC BLOOD PRESSURE: 116 MMHG | HEART RATE: 83 BPM | TEMPERATURE: 98.1 F | HEIGHT: 70 IN | RESPIRATION RATE: 16 BRPM | WEIGHT: 282 LBS | OXYGEN SATURATION: 99 % | DIASTOLIC BLOOD PRESSURE: 73 MMHG | BODY MASS INDEX: 40.37 KG/M2

## 2017-04-16 DIAGNOSIS — R73.9 HYPERGLYCEMIA: Primary | ICD-10-CM

## 2017-04-16 DIAGNOSIS — Z91.14 NON COMPLIANCE W MEDICATION REGIMEN: ICD-10-CM

## 2017-04-16 DIAGNOSIS — R35.0 URINARY FREQUENCY: ICD-10-CM

## 2017-04-16 DIAGNOSIS — H10.11 ALLERGIC CONJUNCTIVITIS, RIGHT: ICD-10-CM

## 2017-04-16 LAB
ALBUMIN SERPL BCP-MCNC: 3.6 G/DL (ref 3.4–5)
ALBUMIN/GLOB SERPL: 1.1 {RATIO} (ref 0.8–1.7)
ALP SERPL-CCNC: 90 U/L (ref 45–117)
ALT SERPL-CCNC: 33 U/L (ref 13–56)
ANION GAP BLD CALC-SCNC: 10 MMOL/L (ref 3–18)
APPEARANCE UR: CLEAR
AST SERPL W P-5'-P-CCNC: 20 U/L (ref 15–37)
BASOPHILS # BLD AUTO: 0.1 K/UL (ref 0–0.06)
BASOPHILS # BLD: 1 % (ref 0–2)
BILIRUB SERPL-MCNC: 0.2 MG/DL (ref 0.2–1)
BILIRUB UR QL: NEGATIVE
BUN SERPL-MCNC: 14 MG/DL (ref 7–18)
BUN/CREAT SERPL: 18 (ref 12–20)
CALCIUM SERPL-MCNC: 9.3 MG/DL (ref 8.5–10.1)
CHLORIDE SERPL-SCNC: 100 MMOL/L (ref 100–108)
CO2 SERPL-SCNC: 27 MMOL/L (ref 21–32)
COLOR UR: YELLOW
CREAT SERPL-MCNC: 0.78 MG/DL (ref 0.6–1.3)
DIFFERENTIAL METHOD BLD: ABNORMAL
EOSINOPHIL # BLD: 0.5 K/UL (ref 0–0.4)
EOSINOPHIL NFR BLD: 4 % (ref 0–5)
ERYTHROCYTE [DISTWIDTH] IN BLOOD BY AUTOMATED COUNT: 13.6 % (ref 11.6–14.5)
EST. AVERAGE GLUCOSE BLD GHB EST-MCNC: 214 MG/DL
GLOBULIN SER CALC-MCNC: 3.4 G/DL (ref 2–4)
GLUCOSE BLD STRIP.AUTO-MCNC: 218 MG/DL (ref 70–110)
GLUCOSE BLD STRIP.AUTO-MCNC: 266 MG/DL (ref 70–110)
GLUCOSE BLD STRIP.AUTO-MCNC: 298 MG/DL (ref 70–110)
GLUCOSE SERPL-MCNC: 311 MG/DL (ref 74–99)
GLUCOSE UR STRIP.AUTO-MCNC: >1000 MG/DL
HBA1C MFR BLD: 9.1 % (ref 4.2–5.6)
HCG UR QL: NEGATIVE
HCT VFR BLD AUTO: 36.1 % (ref 35–45)
HGB BLD-MCNC: 11.8 G/DL (ref 12–16)
HGB UR QL STRIP: NEGATIVE
KETONES UR QL STRIP.AUTO: NEGATIVE MG/DL
LEUKOCYTE ESTERASE UR QL STRIP.AUTO: NEGATIVE
LYMPHOCYTES # BLD AUTO: 27 % (ref 21–52)
LYMPHOCYTES # BLD: 3.2 K/UL (ref 0.9–3.6)
MAGNESIUM SERPL-MCNC: 1.5 MG/DL (ref 1.6–2.6)
MCH RBC QN AUTO: 27.4 PG (ref 24–34)
MCHC RBC AUTO-ENTMCNC: 32.7 G/DL (ref 31–37)
MCV RBC AUTO: 84 FL (ref 74–97)
MONOCYTES # BLD: 0.8 K/UL (ref 0.05–1.2)
MONOCYTES NFR BLD AUTO: 7 % (ref 3–10)
NEUTS SEG # BLD: 7.3 K/UL (ref 1.8–8)
NEUTS SEG NFR BLD AUTO: 61 % (ref 40–73)
NITRITE UR QL STRIP.AUTO: NEGATIVE
PH UR STRIP: 5 [PH] (ref 5–8)
PLATELET # BLD AUTO: 313 K/UL (ref 135–420)
PMV BLD AUTO: 9.7 FL (ref 9.2–11.8)
POTASSIUM SERPL-SCNC: 4 MMOL/L (ref 3.5–5.5)
PROT SERPL-MCNC: 7 G/DL (ref 6.4–8.2)
PROT UR STRIP-MCNC: NEGATIVE MG/DL
RBC # BLD AUTO: 4.3 M/UL (ref 4.2–5.3)
SODIUM SERPL-SCNC: 137 MMOL/L (ref 136–145)
SP GR UR REFRACTOMETRY: >1.03 (ref 1–1.03)
TSH SERPL DL<=0.05 MIU/L-ACNC: 1.12 UIU/ML (ref 0.36–3.74)
UROBILINOGEN UR QL STRIP.AUTO: 1 EU/DL (ref 0.2–1)
WBC # BLD AUTO: 12 K/UL (ref 4.6–13.2)

## 2017-04-16 PROCEDURE — 99284 EMERGENCY DEPT VISIT MOD MDM: CPT

## 2017-04-16 PROCEDURE — 74011250637 HC RX REV CODE- 250/637: Performed by: NURSE PRACTITIONER

## 2017-04-16 PROCEDURE — 81025 URINE PREGNANCY TEST: CPT | Performed by: NURSE PRACTITIONER

## 2017-04-16 PROCEDURE — 83036 HEMOGLOBIN GLYCOSYLATED A1C: CPT | Performed by: NURSE PRACTITIONER

## 2017-04-16 PROCEDURE — 83735 ASSAY OF MAGNESIUM: CPT | Performed by: NURSE PRACTITIONER

## 2017-04-16 PROCEDURE — 74011250636 HC RX REV CODE- 250/636: Performed by: NURSE PRACTITIONER

## 2017-04-16 PROCEDURE — 80053 COMPREHEN METABOLIC PANEL: CPT | Performed by: NURSE PRACTITIONER

## 2017-04-16 PROCEDURE — 81003 URINALYSIS AUTO W/O SCOPE: CPT | Performed by: NURSE PRACTITIONER

## 2017-04-16 PROCEDURE — 74011636637 HC RX REV CODE- 636/637: Performed by: NURSE PRACTITIONER

## 2017-04-16 PROCEDURE — 82962 GLUCOSE BLOOD TEST: CPT

## 2017-04-16 PROCEDURE — 84443 ASSAY THYROID STIM HORMONE: CPT | Performed by: NURSE PRACTITIONER

## 2017-04-16 PROCEDURE — 96360 HYDRATION IV INFUSION INIT: CPT

## 2017-04-16 PROCEDURE — 85025 COMPLETE CBC W/AUTO DIFF WBC: CPT | Performed by: NURSE PRACTITIONER

## 2017-04-16 RX ORDER — OLOPATADINE HYDROCHLORIDE 1 MG/ML
2 SOLUTION/ DROPS OPHTHALMIC 2 TIMES DAILY
Qty: 5 ML | Refills: 0 | Status: SHIPPED | OUTPATIENT
Start: 2017-04-16 | End: 2017-06-28

## 2017-04-16 RX ORDER — METFORMIN HYDROCHLORIDE 1000 MG/1
1000 TABLET ORAL 2 TIMES DAILY WITH MEALS
Qty: 30 TAB | Refills: 0 | Status: SHIPPED | OUTPATIENT
Start: 2017-04-16 | End: 2017-12-15

## 2017-04-16 RX ORDER — DIPHENHYDRAMINE HCL 25 MG
25 CAPSULE ORAL
Status: COMPLETED | OUTPATIENT
Start: 2017-04-16 | End: 2017-04-16

## 2017-04-16 RX ORDER — LORATADINE 10 MG/1
10 TABLET ORAL DAILY
Qty: 15 TAB | Refills: 0 | Status: SHIPPED | OUTPATIENT
Start: 2017-04-16 | End: 2017-06-28

## 2017-04-16 RX ADMIN — INSULIN HUMAN 7 UNITS: 100 INJECTION, SOLUTION PARENTERAL at 18:27

## 2017-04-16 RX ADMIN — SODIUM CHLORIDE 1000 ML: 900 INJECTION, SOLUTION INTRAVENOUS at 17:06

## 2017-04-16 RX ADMIN — DIPHENHYDRAMINE HYDROCHLORIDE 25 MG: 25 CAPSULE ORAL at 17:02

## 2017-04-16 RX ADMIN — INSULIN HUMAN 10 UNITS: 100 INJECTION, SOLUTION PARENTERAL at 17:03

## 2017-04-16 NOTE — ED PROVIDER NOTES
HPI Comments: Patient is a 27 y.o.  female with the following medical history:   Past Medical History:  No date: Arthritic-like pain   Comment: knees and elbows  No date: Chronic back pain Comment: has required steroid injections  No date: Diabetes ([address] Comment: Dx 2005  No date: Endometrial polyp - D&C 3/2017  No date: Hypertension  No date: Irregular menses  No date: Morbid obesity with body mass index of 40.0-49*  No date: PCOS (polycystic ovarian syndrome)  Comment: Endometriosis    Presents to the ED with Fatigue; Itchy Eye; and High Blood Sugar. Ms. Gladis Coburn states that she doesn't have a visit with her PCP before Thursday this week but has been running out of her Metformin and has been only taking 1/2 of it every couple of days. She states that she started having increased frequency of urine and wondered if she had a bladder infection because she was holding her urine because she was going so frequently. Lastly while she was washing dishes she rubbed her right eye with her hand because her allergies have been causing her eyes to be itchy and watery and when she pulled on her eyelid she noticed that the tissue of the corner of her eye looked like it was coming out of her eye like loose meat. She states that it feels itchy and watery at this time. She denies taking anything for her annual allergies that she has had a problem with for the last 6 years, denies using eye drops, she states she has been using a new pair of contacts recently but did not have them in today when she rubbed her eye. Denies headache, change or blurry vision, fever/chills, CP, palpitations, SOB, diaphoresis, orthopnea, abd or back pain, extremity edema/weakness/numbness/parestheisa, hot flashes, night sweats or unexplained change of weight. Patient is a 27 y.o. female presenting with fatigue, eye itching, and hyperglycemia. The history is provided by the patient. Fatigue   This is a new problem.  The current episode started more than 1 week ago. The problem has not changed since onset. There was no focality noted. Pertinent negatives include no focal weakness, no loss of sensation, no loss of balance, no slurred speech, no speech difficulty, no memory loss, no movement disorder, no agitation, no visual change, no auditory change, no mental status change, no unresponsiveness and no disorientation. There has been no fever. Pertinent negatives include no shortness of breath, no chest pain, no vomiting, no altered mental status, no confusion, no headaches, no choking, no nausea, no bowel incontinence and no bladder incontinence. There were no medications administered prior to arrival. Associated medical issues do not include trauma, mood changes, bleeding disorder, seizures, dementia, CVA or clotting disorder. Itchy Eye    This is a new problem. The current episode started less than 1 hour ago. The problem occurs constantly. The problem has not changed since onset. The right eye is affected. Injury mechanism: rubbed eye. The pain is at a severity of 2/10. The pain is mild. There is no history of trauma to the eye. There is no known exposure to pink eye. She wears contacts. Associated symptoms include foreign body sensation, eye redness and itching. Pertinent negatives include no numbness, no blurred vision, no decreased vision, no discharge, no double vision, no photophobia, no nausea, no vomiting, no tingling, no weakness, no fever, no pain, no blindness, no head injury and no dizziness. She has tried nothing for the symptoms. The treatment provided no relief. High Blood Sugar    This is a recurrent problem. The current episode started more than 1 week ago. The problem occurs daily. The problem has been gradually worsening. Associated with: not taking diabetes medications as directed. Pain location: none. The pain is at a severity of 0/10. The patient is experiencing no pain. Associated symptoms include frequency. Pertinent negatives include no anorexia, no fever, no belching, no diarrhea, no flatus, no hematochezia, no melena, no nausea, no vomiting, no constipation, no dysuria, no hematuria, no headaches, no arthralgias, no myalgias, no trauma, no chest pain and no back pain. Nothing worsens the pain. The pain is relieved by nothing. Past workup includes ultrasound, surgery (d&c end of March 2017). Past workup includes no UGI (pending due to chronic upper abd pain and pending GI visit), no colonoscopy and no barium enema. Her past medical history is significant for GERD, UTI and DM. Her past medical history does not include gallstones, ulcerative colitis, Crohn's disease, irritable bowel syndrome, pancreatitis, ectopic pregnancy, ovarian cysts, diverticulitis or kidney stones. Past Medical History:   Diagnosis Date    Arthritic-like pain     knees and elbows    Chronic back pain     has required steroid injections    Diabetes ([address]     Dx 2005    Endometrial polyp     Hypertension     Irregular menses     Morbid [facility] [address] [address]     Morbid obesity with body mass index of 40.0-49.9 (HCC)     PCOS (polycystic ovarian syndrome)     Endometriosis       History reviewed. No pertinent surgical history. Family History:   Problem Relation Age of Onset    Diabetes Maternal Aunt     Diabetes Paternal Grandmother     Diabetes Paternal Grandfather     Diabetes Mother        Social History     Social History    Marital status:      Spouse name: N/A    Number of children: N/A    Years of education: N/A     Occupational History    Not on file.      Social History Main Topics    Smoking status: Never Smoker    Smokeless tobacco: Never Used    Alcohol use No    Drug use: No    Sexual activity: Yes     Partners: Male     Birth control/ protection: None     Other Topics Concern    Not on file     Social History Narrative         ALLERGIES: Review of patient's allergies indicates no known allergies. Review of Systems   Constitutional: Positive for fatigue. Negative for activity change, appetite change, chills and fever. HENT: Negative for trouble swallowing and voice change. Eyes: Positive for redness and itching. Negative for blindness, blurred vision, double vision, photophobia, pain, discharge and visual disturbance. Respiratory: Negative for cough, choking, chest tightness and shortness of breath. Cardiovascular: Negative for chest pain and leg swelling. Gastrointestinal: Negative for abdominal pain, anorexia, bowel incontinence, constipation, diarrhea, flatus, hematochezia, melena, nausea and vomiting. Endocrine: Positive for polyuria. Negative for polyphagia. Genitourinary: Positive for frequency. Negative for bladder incontinence, difficulty urinating, dysuria, flank pain, hematuria and urgency. Musculoskeletal: Negative for arthralgias, back pain, joint swelling, myalgias, neck pain and neck stiffness. Skin: Positive for itching. Negative for color change, rash and wound. Allergic/Immunologic: Positive for environmental allergies. Negative for food allergies and immunocompromised state. Neurological: Negative for dizziness, tingling, focal weakness, speech difficulty, weakness, numbness, headaches and loss of balance. Psychiatric/Behavioral: Negative for agitation, behavioral problems, confusion and memory loss. The patient is not nervous/anxious. Vitals:    04/16/17 1557   BP: 116/73   Pulse: 83   Resp: 16   Temp: 98.1 °F (36.7 °C)   SpO2: 99%   Weight: 127.9 kg (282 lb)   Height: 5' 10\" (1.778 m)            Physical Exam   Constitutional: She is oriented to person, place, and time. She appears well-developed and well-nourished. No distress. HENT:   Head: Normocephalic. Eyes: EOM and lids are normal. Right eye exhibits no discharge, no exudate and no hordeolum. No foreign body present in the right eye. Left eye exhibits no discharge.  Right conjunctiva is injected. Right conjunctiva has no hemorrhage. Left conjunctiva is not injected. Left conjunctiva has no hemorrhage. No scleral icterus. Fundoscopic exam:       The right eye shows no arteriolar narrowing, no AV nicking, no exudate, no hemorrhage and no papilledema. The right eye shows no red reflex. The left eye shows no arteriolar narrowing, no AV nicking, no exudate, no hemorrhage and no papilledema. The left eye shows no red reflex. Neck: Normal range of motion. Neck supple. No JVD present. No tracheal deviation present. Cardiovascular: Normal rate, regular rhythm and normal heart sounds. Exam reveals no gallop and no friction rub. No murmur heard. Pulmonary/Chest: Effort normal and breath sounds normal. No stridor. No respiratory distress. She has no wheezes. She exhibits no tenderness. Abdominal: Soft. Bowel sounds are normal. She exhibits no mass. There is no tenderness. There is no guarding. Musculoskeletal: Normal range of motion. She exhibits no edema or tenderness. Lymphadenopathy:     She has no cervical adenopathy. Neurological: She is alert and oriented to person, place, and time. No cranial nerve deficit. Coordination normal.   Skin: Skin is warm and dry. No rash noted. No erythema. Psychiatric: She has a normal mood and affect. Her behavior is normal. Judgment and thought content normal.   Nursing note and vitals reviewed. MDM  Number of Diagnoses or Management Options  Allergic conjunctivitis, right:   Hyperglycemia:   Non compliance w medication regimen:   Urinary frequency:   Diagnosis management comments: Ms. Britany Anderson has an elevated glucose and will check her labs to look for occult infection since she had a surgical procedure about 2 weeks ago. No systemic issues of fever/chills or malaise, appears non toxic, is spontaneously conversational and in no acute distress.  Eye exam is normal and encouraged her not to pull on the eyelids or rub aggressively because with allergies she can cause trauma to the eye lid which results in swelling which is not visible at this time but may have been an issue earlier today. Allergy treatment, insulin for glucose over 300 along with fluids. Electrolytes appear normal, UA is normal and no DKA or infection is detected. Will provide her with a short prescription for her anti-diabetics and strongly encourage her to follow up with her PCP as scheduled on Thursday. The primary encounter diagnosis was Hyperglycemia. Diagnoses of Urinary frequency, Allergic conjunctivitis, right, and Non compliance w medication regimen were also pertinent to this visit.       ED Course       Procedures

## 2017-04-16 NOTE — ED TRIAGE NOTES
Pt presents to the ED with right eye itching and swelling onset today at approximately 1400 hrs. Pt states \"I think my diabetes is high, but I haven't checked it. \" States feeling fatigued and urinating often. Pt denies having \"UTI irritation. \" Pt recent UTI in January.

## 2017-05-17 ENCOUNTER — OFFICE VISIT (OUTPATIENT)
Dept: OBGYN CLINIC | Age: 32
End: 2017-05-17

## 2017-05-17 VITALS
WEIGHT: 275 LBS | HEIGHT: 70 IN | HEART RATE: 81 BPM | SYSTOLIC BLOOD PRESSURE: 152 MMHG | BODY MASS INDEX: 39.37 KG/M2 | DIASTOLIC BLOOD PRESSURE: 85 MMHG

## 2017-05-17 DIAGNOSIS — Z09 POSTOP CHECK: Primary | ICD-10-CM

## 2017-05-17 DIAGNOSIS — E28.2 PCOS (POLYCYSTIC OVARIAN SYNDROME): ICD-10-CM

## 2017-05-17 DIAGNOSIS — N91.5 OLIGOMENORRHEA: ICD-10-CM

## 2017-05-17 RX ORDER — MEDROXYPROGESTERONE ACETATE 10 MG/1
10 TABLET ORAL DAILY
Qty: 30 TAB | Refills: 2 | Status: SHIPPED | OUTPATIENT
Start: 2017-05-17 | End: 2017-06-28

## 2017-05-17 NOTE — PROGRESS NOTES
,who is > 6 weeks postop check for EUA and D&C. She is doing well. Examination: abd --normactive and nontender. Pelvic --External genitalia & BUS--normal.                                     Vaginal--normal .                                      Cervix--within normal limits. Uterus--regular and normal.                                     Adnexa--normal.    Impression--normal postop check. Proliferative endometrium--found. Plan--Pathology reviewed with the patient. She will be given the regular Provera 10 mg po daily x 5 days to withdraw her cycle prn.           (since she has a hx of oligomenorrhea/amenorrhea)    RTC--prn.

## 2017-05-17 NOTE — PATIENT INSTRUCTIONS
Polycystic Ovary Syndrome: Care Instructions  Your Care Instructions  Polycystic ovary syndrome, or PCOS, means a woman's hormones are out of balance. It can cause problems with your periods and make it hard to get pregnant. Doctors don't know for sure what causes PCOS, but it seems to run in families. It also seems to be linked to obesity and a risk for diabetes. If you have PCOS, your sisters and daughters have a higher chance of getting it too. You may have other symptoms. These include weight gain, acne, too much hair growth on the face or body, high blood pressure, and high blood sugar. Your ovaries may have cysts on them. These cysts are growths filled with fluid. Keep in mind that although you may not have regular periods, you can still get pregnant. Talk to your doctor about birth control if you do not want to get pregnant. Sometimes the hormone changes with PCOS can also make it hard for some women to get pregnant. If this is a concern, talk to your doctor about treatment for this problem. Women who have PCOS can go for months or longer with no period. Your doctor may recommend medicines that can help get your cycles back to normal.  Follow-up care is a key part of your treatment and safety. Be sure to make and go to all appointments, and call your doctor if you are having problems. It's also a good idea to know your test results and keep a list of the medicines you take. How can you care for yourself at home? · Take your medicines exactly as prescribed. Call your doctor if you think you are having a problem with your medicine. · Eat a healthy diet. Include fruits, vegetables, beans, and whole grains in your diet each day. · If you are overweight, losing weight can help with many of the symptoms of PCOS. Talk to your doctor about safe ways to lose weight. · Get at least 30 minutes of exercise on most days of the week. Walking is a good choice.  Or you can run, swim, cycle, or play tennis or team sports. · For hair growth you don't want, try bleaching, plucking, electrolysis, or laser therapy. · Acne can be treated with over-the-counter medicines. Look for ones that have benzoyl peroxide or salicylic acid in them. When should you call for help? Call your doctor now or seek immediate medical care if:  · You have severe vaginal bleeding. This means that you are soaking through your usual pads or tampons each hour for 2 or more hours. Watch closely for changes in your health, and be sure to contact your doctor if:  · You have more vaginal bleeding, or bleeding is more irregular. Where can you learn more? Go to http://león-irving.info/. Enter O780 in the search box to learn more about \"Polycystic Ovary Syndrome: Care Instructions. \"  Current as of: October 13, 2016  Content Version: 11.2  © 8067-6490 [employer]. Care instructions adapted under license by [employer] (which disclaims liability or warranty for this information). If you have questions about a medical condition or this instruction, always ask your healthcare professional. [address] any warranty or liability for your use of this information. Vaginal Bleeding in Nonpregnant Women: Care Instructions  Your Care Instructions    Many women have bleeding or spotting between periods. Lots of things can cause it. You may bleed because of hormone problems, stress, or ovulation. Fibroids and IUDs (intrauterine devices) can also cause bleeding. If your bleeding or spotting is caused by one of these things and is not heavy or doesn't happen often, you probably don't need to worry. But in rare cases, infection, cancer, or other serious conditions can cause bleeding. So you may need more tests to find the cause of your bleeding. The doctor has checked you carefully, but problems can develop later.  If you notice any problems or new symptoms, get medical treatment right away.  Follow-up care is a key part of your treatment and safety. Be sure to make and go to all appointments, and call your doctor if you are having problems. It's also a good idea to know your test results and keep a list of the medicines you take. How can you care for yourself at home? · Take pain medicines exactly as directed. ¨ If the doctor gave you a prescription medicine for pain, take it as prescribed. ¨ If you are not taking a prescription pain medicine, ask your doctor if you can take an over-the-counter medicine. Do not take aspirin, which may make bleeding worse. · If your doctor prescribed birth control pills for your bleeding, take them as directed. · Eat foods that are high in iron and vitamin C. Foods high in iron include red meat, shellfish, eggs, beans, and leafy green vegetables. Foods high in vitamin C include citrus fruits, tomatoes, and broccoli. Ask your doctor if you need to take iron pills or a multivitamin. · Ask your doctor when it is okay to have sex. When should you call for help? Call 911 anytime you think you may need emergency care. For example, call if:  · You passed out (lost consciousness). · You have sudden, severe pain in your belly or pelvis. Call your doctor now or seek immediate medical care if:  · You have severe vaginal bleeding. You are soaking through your usual pads or tampons each hour for 2 or more hours. · You are dizzy or lightheaded or you feel like you may faint. · You have new belly or pelvic pain. · You have a fever. · Your bleeding gets worse. Watch closely for changes in your health, and be sure to contact your doctor if:  · You have new or worse vaginal discharge. · You do not get better as expected. Where can you learn more? Go to http://león-irving.info/. Enter I394 in the search box to learn more about \"Vaginal Bleeding in Nonpregnant Women: Care Instructions. \"  Current as of: October 13, 2016  Content Version: 11.2  © 9462-1097 [employer]. Care instructions adapted under license by [employer] (which disclaims liability or warranty for this information). If you have questions about a medical condition or this instruction, always ask your healthcare professional. [address] any warranty or liability for your use of this information.

## 2017-05-17 NOTE — MR AVS SNAPSHOT
Visit Information Date & Time Provider Department Dept. Phone Encounter #  
 5/17/2017 12:30 PM Dain MilesDoris [id number] Follow-up Instructions Return if symptoms worsen or fail to improve. Upcoming Health Maintenance Date Due INFLUENZA AGE 9 TO ADULT 8/1/2017 PAP AKA CERVICAL CYTOLOGY 3/15/2020 Allergies as of 5/17/2017  Review Complete On: 5/17/2017 By: Dain Miles MD  
 No Known Allergies Current Immunizations  Never Reviewed Name Date TDAP Vaccine 11/7/2012 11:37 AM  
 Tdap 12/14/2013  8:07 PM  
  
 Not reviewed this visit You Were Diagnosed With   
  
 Codes Comments Postop check    -  Primary ICD-10-CM: P38 ICD-9-CM: V67.00   
 PCOS (polycystic ovarian syndrome)     ICD-10-CM: E28.2 ICD-9-CM: 256.4 Oligomenorrhea     ICD-10-CM: N91.5 ICD-9-CM: 626.1 Vitals BP Pulse Height(growth percentile) Weight(growth percentile) BMI OB Status 152/85 (BP 1 Location: Right arm, BP Patient Position: Sitting) 81 5' 10\" (1.778 m) 275 lb (124.7 kg) 39.46 kg/m2 Polycystic Ovarian Syndrome Smoking Status Never Smoker BMI and BSA Data Body Mass Index Body Surface Area  
 39.46 kg/m 2 2.48 m 2 Preferred Pharmacy Pharmacy Name Phone [facility] - [address] [phone] Your Updated Medication List  
  
   
This list is accurate as of: 5/17/17  1:13 PM.  Always use your most recent med list.  
  
  
  
  
 CeleXA 20 mg tablet Generic drug:  citalopram  
Take 20 mg by mouth daily. lisinopril-hydroCHLOROthiazide 10-12.5 mg per tablet Commonly known as:  Bailee Rower Take 1 tablet by mouth daily. loratadine 10 mg tablet Commonly known as:  Manual Me Take 1 Tab by mouth daily. Indications: SNEEZING  
  
 medroxyPROGESTERone 10 mg tablet Commonly known as:  PROVERA Take 1 Tab by mouth daily. Indications: SECONDARY AMENORRHEA, oligomenorrea. metFORMIN 1,000 mg tablet Commonly known as:  GLUCOPHAGE Take 1 Tab by mouth two (2) times daily (with meals). Indications: type 2 diabetes mellitus  
  
 olopatadine 0.1 % ophthalmic solution Commonly known as:  PATANOL Administer 2 Drops to both eyes two (2) times a day. Indications: Allergic Conjunctivitis Prescriptions Sent to Pharmacy Refills  
 medroxyPROGESTERone (PROVERA) 10 mg tablet 2 Sig: Take 1 Tab by mouth daily. Indications: SECONDARY AMENORRHEA, oligomenorrea. Class: Normal  
 Pharmacy: [id number]  #: [phone] Route: Oral  
  
Follow-up Instructions Return if symptoms worsen or fail to improve. Patient Instructions Polycystic Ovary Syndrome: Care Instructions Your Care Instructions Polycystic ovary syndrome, or PCOS, means a woman's hormones are out of balance. It can cause problems with your periods and make it hard to get pregnant. Doctors don't know for sure what causes PCOS, but it seems to run in families. It also seems to be linked to obesity and a risk for diabetes. If you have PCOS, your sisters and daughters have a higher chance of getting it too. You may have other symptoms. These include weight gain, acne, too much hair growth on the face or body, high blood pressure, and high blood sugar. Your ovaries may have cysts on them. These cysts are growths filled with fluid. Keep in mind that although you may not have regular periods, you can still get pregnant. Talk to your doctor about birth control if you do not want to get pregnant. Sometimes the hormone changes with PCOS can also make it hard for some women to get pregnant. If this is a concern, talk to your doctor about treatment for this problem. Women who have PCOS can go for months or longer with no period.  Your doctor may recommend medicines that can help get your cycles back to normal. 
Follow-up care is a key part of your treatment and safety. Be sure to make and go to all appointments, and call your doctor if you are having problems. It's also a good idea to know your test results and keep a list of the medicines you take. How can you care for yourself at home? · Take your medicines exactly as prescribed. Call your doctor if you think you are having a problem with your medicine. · Eat a healthy diet. Include fruits, vegetables, beans, and whole grains in your diet each day. · If you are overweight, losing weight can help with many of the symptoms of PCOS. Talk to your doctor about safe ways to lose weight. · Get at least 30 minutes of exercise on most days of the week. Walking is a good choice. Or you can run, swim, cycle, or play tennis or team sports. · For hair growth you don't want, try bleaching, plucking, electrolysis, or laser therapy. · Acne can be treated with over-the-counter medicines. Look for ones that have benzoyl peroxide or salicylic acid in them. When should you call for help? Call your doctor now or seek immediate medical care if: 
· You have severe vaginal bleeding. This means that you are soaking through your usual pads or tampons each hour for 2 or more hours. Watch closely for changes in your health, and be sure to contact your doctor if: 
· You have more vaginal bleeding, or bleeding is more irregular. Where can you learn more? Go to http://león-irving.info/. Enter P666 in the search box to learn more about \"Polycystic Ovary Syndrome: Care Instructions. \" Current as of: October 13, 2016 Content Version: 11.2 © 9846-6392 Nitch. Care instructions adapted under license by [employer] (which disclaims liability or warranty for this information).  If you have questions about a medical condition or this instruction, always ask your healthcare professional. [address] any warranty or liability for your use of this information. Vaginal Bleeding in Nonpregnant Women: Care Instructions Your Care Instructions Many women have bleeding or spotting between periods. Lots of things can cause it. You may bleed because of hormone problems, stress, or ovulation. Fibroids and IUDs (intrauterine devices) can also cause bleeding. If your bleeding or spotting is caused by one of these things and is not heavy or doesn't happen often, you probably don't need to worry. But in rare cases, infection, cancer, or other serious conditions can cause bleeding. So you may need more tests to find the cause of your bleeding. The doctor has checked you carefully, but problems can develop later. If you notice any problems or new symptoms, get medical treatment right away. Follow-up care is a key part of your treatment and safety. Be sure to make and go to all appointments, and call your doctor if you are having problems. It's also a good idea to know your test results and keep a list of the medicines you take. How can you care for yourself at home? · Take pain medicines exactly as directed. ¨ If the doctor gave you a prescription medicine for pain, take it as prescribed. ¨ If you are not taking a prescription pain medicine, ask your doctor if you can take an over-the-counter medicine. Do not take aspirin, which may make bleeding worse. · If your doctor prescribed birth control pills for your bleeding, take them as directed. · Eat foods that are high in iron and vitamin C. Foods high in iron include red meat, shellfish, eggs, beans, and leafy green vegetables. Foods high in vitamin C include citrus fruits, tomatoes, and broccoli. Ask your doctor if you need to take iron pills or a multivitamin. · Ask your doctor when it is okay to have sex. When should you call for help? Call 911 anytime you think you may need emergency care. For example, call if: 
· You passed out (lost consciousness). · You have sudden, severe pain in your belly or pelvis. Call your doctor now or seek immediate medical care if: 
· You have severe vaginal bleeding. You are soaking through your usual pads or tampons each hour for 2 or more hours. · You are dizzy or lightheaded or you feel like you may faint. · You have new belly or pelvic pain. · You have a fever. · Your bleeding gets worse. Watch closely for changes in your health, and be sure to contact your doctor if: 
· You have new or worse vaginal discharge. · You do not get better as expected. Where can you learn more? Go to http://león-irving.info/. Enter R034 in the search box to learn more about \"Vaginal Bleeding in Nonpregnant Women: Care Instructions. \" Current as of: October 13, 2016 Content Version: 11.2 © 1650-0842 [employer]Legal. Care instructions adapted under license by [employer]ike (which disclaims liability or warranty for this information). If you have questions about a medical condition or this instruction, always ask your healthcare professional. [place] [address] any warranty or liability for your use of this information. Introducing [facility] [place] [facility]! Dear Pura Downs: Thank you for requesting a Skyepack account. Our records indicate that you already have an active Skyepack account. You can access your account anytime at [email or web address] Did you know that you can access your hospital and ER discharge instructions at any time in Skyepack? You can also review all of your test results from your hospital stay or ER visit. Additional Information If you have questions, please visit the Frequently Asked Questions section of the Skyepack website at [email or web address] Remember, MyChart is NOT to be used for urgent needs. For medical emergencies, dial 911. Now available from your iPhone and Android! Please provide this summary of care documentation to your next provider. Your primary care clinician is listed as Michelle Skinner. If you have any questions after today's visit, please call [phone].

## 2017-08-15 ENCOUNTER — CLINICAL SUPPORT (OUTPATIENT)
Dept: SURGERY | Age: 32
End: 2017-08-15

## 2017-08-15 VITALS — WEIGHT: 279 LBS | HEIGHT: 70 IN | BODY MASS INDEX: 39.94 KG/M2

## 2017-08-15 DIAGNOSIS — E66.01 MORBID OBESITY WITH BODY MASS INDEX OF 40.0-49.9 (HCC): Primary | ICD-10-CM

## 2017-08-15 NOTE — PATIENT INSTRUCTIONS
Goals: 1. Increase water consumption at home. 2. Work on no caffeine by surgery. Only use sugar free items after surgery. Resume caffeine when drinking 64 oz per day after surgery- usually 2 months. 3. Stop taking garcinia cambogia extract until you talk to your physician that prescribes your diabetes medicine. Do not take this 10 day prior to surgery and do not take after surgery. 4. Start weight lifting- 3 times per week- 3 minutes to an hour. Also cardiovascular exercise- 3 times per week for 15 minutes. 5. Try protein drinks on the top 10 list.    6. Follow the 3 balanced meals per day guide- in packet. Add more green veggies- try to have veggies at lunch and dinner daily.

## 2017-08-15 NOTE — PROGRESS NOTES
Medical Weight Loss Multi-Disciplinary Program    Name: Fam Salazar   : 1985    Session# 1, restart   Date: 8/15/2017     Height: 5' 10\" (177.8 cm)    Weight: 126.6 kg (279 lb) lbs. Body mass index is 40.03 kg/(m^2). Dietary Instructions    Reviewed intake  Understanding low carbohydrates, low sugar, higher protein meals  Understanding proper portions  Instruction given for personal dietary changes  Comments: Pt given brief pre/post-op diet ed and diet hx reviewed. Physical Activity/Exercise    Discussed Perceived Compliance  Reasonable Goals Set  Motivation  Comments: Pt is not currently exercising. Goal to start weight lifting- 3 times per week- 3 minutes to an hour. Also cardiovascular exercise- 3 times per week for 15 minutes. Behavior Modification    Positive attitude  Comments: Pt is working on the following goals:    1. Increase water consumption at home. 2. Work on no caffeine by surgery. Only use sugar free items after surgery. Resume caffeine when drinking 64 oz per day after surgery- usually 2 months. 3. Stop taking garcinia cambogia extract until you talk to your physician that prescribes your diabetes medicine. Do not take this 10 day prior to surgery and do not take after surgery. 4. Start weight lifting- 3 times per week- 3 minutes to an hour. Also cardiovascular exercise- 3 times per week for 15 minutes. 5. Try protein drinks on the top 10 list.    6. Follow the 3 balanced meals per day guide- in packet. Add more green veggies- try to have veggies at lunch and dinner daily. Candidate for surgery (per RD): pending    Dietitian: Deep Dove RD     Fam Salazar is a 27 y.o. female who present for a pre-op evaluation.     Visit Vitals    Ht 5' 10\" (1.778 m)    Wt 126.6 kg (279 lb)    BMI 40.03 kg/m2     Past Medical History:   Diagnosis Date    Arthritic-like pain     knees and elbows    Chronic back pain     has required steroid injections    Diabetes [facility])     Dx 2005    Endometrial polyp     Hypertension     Irregular menses     Morbid obesity ([address]     Morbid obesity with body mass index of 40.0-49.9 (HCC)     PCOS (polycystic ovarian syndrome)     Endometriosis           Procedure:  laparoscopic sleeve gastrectomy     Reasons for Surgery:  BMI > 40 with one or more medically significant comorbidities    Summary:  Pt given brief pre/post-op diet ed and diet hx reviewed. Pt set several goals. See below. Current Vitamins: Vitamin D- prescription and prenatal vitamin, garcinia cambrogia extract    What have you done in the past to try to lose weight? Exercising and cutting back on calories/portions    Why didn't you lose weight or keep the weight off?: Because of PCOS and endometriosis. 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 1-2    Do you eat between meals / snack? yes  Typical snack: chips or cookies- mainly chips    How fast do you eat your meals? varies    How many sodas/sugared beverages do you drink per day? If goes out will have 2 cups of sweet tea/lemonade, Ginger ale- love this- not every day- but will drink a 2 liter per day if in your house. How many caffeinated drinks do you have per day? Sometimes coffee and tea, sometimes Red Bull- mainly sugar free or 5 hours energy     How much water do you drink per day? 64 oz of water at work, less than 33 oz at home.      How often do you eat fast food? occasionally- may want a fish sandwich from [employer] or a 4 piece nugget from [facility]     How often do you consume alcohol? never    Diet History:  Breakfast  What are you eating and how much? skipped   When? ii   Where? iii   Snacks  What are you eating and how much? i   When? ii   Where? iii   Hydration  What are you eating and how much? water   When? ii   Where? ii   Lunch  What are you eating and how much? Joest Yanira amaral- a lot of different things- steak, shrimp, green beans, bourbon chicken    When? ii   Where? iii   Snacks  What are you eating and how much? i   When? ii   Where? iii   Hydration  What are you eating and how much? i   When? ii   Where? iii   Dinner  What are you eating and how much? i   When? ii   Where? iii   Snacks  What are you eating and how much? i   When? ii   Where? iii   Hydration  What are you eating and how much? i   When? ii   Where? iii     Exercise:  Do you currently have an exercise routine? no    Goals:   1. Increase water consumption at home. 2. Work on no caffeine by surgery. Only use sugar free items after surgery. Resume caffeine when drinking 64 oz per day after surgery- usually 2 months. 3. Stop taking garcinia cambogia extract until you talk to your physician that prescribes your diabetes medicine. Do not take this 10 day prior to surgery and do not take after surgery. 4. Start weight lifting- 3 times per week- 3 minutes to an hour. Also cardiovascular exercise- 3 times per week for 15 minutes. 5. Try protein drinks on the top 10 list.    6. Follow the 3 balanced meals per day guide- in packet. Add more green veggies- try to have veggies at lunch and dinner daily.

## 2017-08-15 NOTE — MR AVS SNAPSHOT
Visit Information Date & Time Provider Department Dept. Phone Encounter #  
 8/15/2017  4:00 PM [facility] [facility] [place] [phone] [id number] Upcoming Health Maintenance Date Due  
 FOOT EXAM Q1 3/21/1993 MICROALBUMIN Q1 3/21/1993 EYE EXAM RETINAL OR DILATED Q1 3/21/1993 Pneumococcal 19-64 Medium Risk (1 of 1 - PPSV23) 3/21/2002 LIPID PANEL Q1 12/3/2015 INFLUENZA AGE 9 TO ADULT 8/1/2017 HEMOGLOBIN A1C Q6M 10/16/2017 PAP AKA CERVICAL CYTOLOGY 3/15/2020 DTaP/Tdap/Td series (2 - Td) 12/14/2023 Allergies as of 8/15/2017  Review Complete On: 6/28/2017 By: Thomas Dowd No Known Allergies Current Immunizations  Never Reviewed Name Date TDAP Vaccine 11/7/2012 11:37 AM  
 Tdap 12/14/2013  8:07 PM  
  
 Not reviewed this visit Vitals Height(growth percentile) Weight(growth percentile) BMI OB Status Smoking Status 5' 10\" (1.778 m) 279 lb (126.6 kg) 40.03 kg/m2 Polycystic Ovarian Syndrome Never Smoker Vitals History BMI and BSA Data Body Mass Index Body Surface Area 40.03 kg/m 2 2.5 m 2 Preferred Pharmacy Pharmacy Name Phone [facility] - [address] [phone] Your Updated Medication List  
  
   
This list is accurate as of: 8/15/17  4:29 PM.  Always use your most recent med list.  
  
  
  
  
 CeleXA 20 mg tablet Generic drug:  citalopram  
Take 20 mg by mouth daily. lisinopril-hydroCHLOROthiazide 10-12.5 mg per tablet Commonly known as:  Messi Rom Take 1 tablet by mouth daily. metFORMIN 1,000 mg tablet Commonly known as:  GLUCOPHAGE Take 1 Tab by mouth two (2) times daily (with meals). Indications: type 2 diabetes mellitus Patient Instructions Goals:  
1. Increase water consumption at home. 2. Work on no caffeine by surgery.  Only use sugar free items after surgery. Resume caffeine when drinking 64 oz per day after surgery- usually 2 months. 3. Stop taking garcinia cambogia extract until you talk to your physician that prescribes your diabetes medicine. Do not take this 10 day prior to surgery and do not take after surgery. 4. Start weight lifting- 3 times per week- 3 minutes to an hour. Also cardiovascular exercise- 3 times per week for 15 minutes. 5. Try protein drinks on the top 10 list.   
6. Follow the 3 balanced meals per day guide- in packet. Add more green veggies- try to have veggies at lunch and dinner daily. Introducing [facility] & [facility] SERVICES! Dear Yecenia Drew: Thank you for requesting a Diabetica account. Our records indicate that you already have an active Diabetica account. You can access your account anytime at [email or web address] Did you know that you can access your hospital and ER discharge instructions at any time in Diabetica? You can also review all of your test results from your hospital stay or ER visit. Additional Information If you have questions, please visit the Frequently Asked Questions section of the Diabetica website at [email or web address] Remember, Diabetica is NOT to be used for urgent needs. For medical emergencies, dial 911. Now available from your iPhone and Android! Please provide this summary of care documentation to your next provider. Your primary care clinician is listed as Wilbur Quevedo. If you have any questions after today's visit, please call [phone].

## 2017-08-25 RX ORDER — BISMUTH SUBSALICYLATE 262 MG
1 TABLET,CHEWABLE ORAL DAILY
COMMUNITY
End: 2018-02-07

## 2017-09-12 ENCOUNTER — OFFICE VISIT (OUTPATIENT)
Dept: SURGERY | Age: 32
End: 2017-09-12

## 2017-09-12 VITALS — HEIGHT: 70 IN | WEIGHT: 275 LBS | BODY MASS INDEX: 39.37 KG/M2

## 2017-09-12 DIAGNOSIS — E66.01 MORBID OBESITY WITH BODY MASS INDEX OF 40.0-49.9 (HCC): Primary | ICD-10-CM

## 2017-09-12 NOTE — PROGRESS NOTES
Medical Weight Loss Multi-Disciplinary Program    Name: Chanel Polanco   : 1985    Session# 2  Date: 2017     Height: 5' 10\" (177.8 cm)    Weight: 124.7 kg (275 lb) lbs. Body mass index is 39.46 kg/(m^2). Pounds Lost: 4     Dietary Instructions    Reviewed intake  Understanding low carbohydrates, low sugar, higher protein meals  Understanding proper portions  Instruction given for personal dietary changes  Discussed perceived compliance  Comments: Diet hx reviewed and personal dietary changes discussed. Pt given bariatric pt diet/support group resources education. Physical Activity/Exercise    Discussed Perceived Compliance  Reasonable Goals Set  Motivation  Comments: Pt has not been exercising due to working two jobs. Goal to fit in exercising between jobs and 10 am when able. Behavior Modification    Achieving/Rewarding goals met  Positive attitude  Discussed perceived compliance  Comments: Pt has not been exercising but plans to start. 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 She is working to sleep at least 5 hours every day. She is working to decrease carbohydrates, increase green vegetables (cook some for herself when she prepares them for her daughter), examine daily routine for better sleep/work/home/eating balance, and try other protein drinks (did not like the thickness of [place]jury) such as gene pro.      Candidate for surgery (per RD): pending    Dietitian: Pascual Yoo RD

## 2017-10-11 ENCOUNTER — HOSPITAL ENCOUNTER (OUTPATIENT)
Age: 32
Setting detail: OUTPATIENT SURGERY
Discharge: HOME | End: 2017-10-11
Attending: SPECIALIST | Admitting: SPECIALIST
Payer: COMMERCIAL

## 2017-10-11 ENCOUNTER — APPOINTMENT (OUTPATIENT)
Dept: GENERAL RADIOLOGY | Age: 32
End: 2017-10-11
Attending: SPECIALIST
Payer: COMMERCIAL

## 2017-10-11 VITALS
HEIGHT: 70 IN | WEIGHT: 275.1 LBS | TEMPERATURE: 96.3 F | HEART RATE: 72 BPM | BODY MASS INDEX: 39.38 KG/M2 | DIASTOLIC BLOOD PRESSURE: 97 MMHG | SYSTOLIC BLOOD PRESSURE: 145 MMHG | OXYGEN SATURATION: 99 % | RESPIRATION RATE: 6 BRPM

## 2017-10-11 DIAGNOSIS — E66.01 MORBID OBESITY (HCC): ICD-10-CM

## 2017-10-11 PROCEDURE — 74011000255 HC RX REV CODE- 255: Performed by: SPECIALIST

## 2017-10-11 PROCEDURE — 74240 X-RAY XM UPR GI TRC 1CNTRST: CPT

## 2017-10-11 PROCEDURE — 76040000019: Performed by: SPECIALIST

## 2017-10-11 NOTE — IP AVS SNAPSHOT
[address] [facility], [place], operated by [facility] 
 
 
 [address] [address] 
[phone] Patient: Blessing Hamm MRN: [id number] AYX:[id number] You are allergic to the following No active allergies Recent Documentation Height Weight BMI OB Status Smoking Status 1.778 m 124.8 kg 39.47 kg/m2 Polycystic Ovarian Syndrome Never Smoker Emergency Contacts Name Discharge Info Relation Home Work Mobile [address] CAREGIVER [3] Spouse [3]   [phone] About your hospitalization You were admitted on:  October 11, 2017 You last received care in the:  [facility] ENDOSCOPY You were discharged on:  October 11, 2017 Unit phone number:  [phone] Why you were hospitalized Your primary diagnosis was:  Not on File Providers Seen During Your Hospitalizations Provider Role Specialty Primary office phone Noah Gayle MD Attending Provider General Surgery [phone] Your Primary Care Physician (PCP) Primary Care Physician Office Phone Office Fax [phone],[facility], [address] [place] [address] [phone] Follow-up Information None Your Appointments Tuesday October 17, 2017 12:00 PM EDT  
EDUCATION CLASS with TSS NUTRI VISIT PEN [facility] ([facility])  
 [address] [place] [address]  
[phone] Monday November 13, 2017 10:00 AM EST  
NUTRITION COUNSELING with TSS NUTR VISIT2 [facility] ([facility])  
 [address] [place] [address]  
[phone] Monday November 13, 2017 11:00 AM EST New Patient with Vearl Hanks, NP [facility] ([facility])  
 [address] [place] [address] [address] [address]  
[phone] Current Discharge Medication List  
  
 ASK your doctor about these medications Dose & Instructions Dispensing Information Comments Morning Noon Evening Bedtime CeleXA 20 mg tablet Generic drug:  citalopram  
   
Your last dose was: Your next dose is:    
   
   
 Dose:  20 mg Take 20 mg by mouth daily. Refills:  0  
     
   
   
   
  
 lisinopril-hydroCHLOROthiazide 10-12.5 mg per tablet Commonly known as:  Donnamarie Parrot Your last dose was: Your next dose is:    
   
   
 Dose:  1 Tab Take 1 tablet by mouth daily. Quantity:  30 tablet Refills:  5  
     
   
   
   
  
 metFORMIN 1,000 mg tablet Commonly known as:  GLUCOPHAGE Your last dose was: Your next dose is:    
   
   
 Dose:  1000 mg Take 1 Tab by mouth two (2) times daily (with meals). Indications: type 2 diabetes mellitus Quantity:  30 Tab Refills:  0  
     
   
   
   
  
 multivitamin tablet Commonly known as:  ONE A DAY Your last dose was: Your next dose is:    
   
   
 Dose:  1 Tab Take 1 Tab by mouth daily. Refills:  0  
     
   
   
   
  
 VICTOZA 2-MANDY 0.6 mg/0.1 mL (18 mg/3 mL) Pnij Generic drug:  Liraglutide Your last dose was: Your next dose is:    
   
   
 Dose:  1.6 mg  
1.6 mg by SubCUTAneous route daily. Refills:  0 Discharge Instructions Verbal and written post adjustment / UGI instructions given. Patient acknowledges understanding. Discussed diet, activities, and s/s that should be reported. Encouraged to call to schedule next appointment and to call with any questions or concerns. Discharge Orders None Introducing [facility]! Dear Tito Whitten: Thank you for requesting a Slidebean account. Our records indicate that you already have an active Slidebean account. You can access your account anytime at [email or web address] Did you know that you can access your hospital and ER discharge instructions at any time in Supersolid? You can also review all of your test results from your hospital stay or ER visit. Additional Information If you have questions, please visit the Frequently Asked Questions section of the Supersolid website at [email or web address] Remember, Collaajhart is NOT to be used for urgent needs. For medical emergencies, dial 911. Now available from your iPhone and Android! General Information Please provide this summary of care documentation to your next provider. Patient Signature:  ____________________________________________________________ Date:  ____________________________________________________________  
  
Gume Biggs Provider Signature:  ____________________________________________________________ Date:  ____________________________________________________________

## 2017-10-11 NOTE — IP AVS SNAPSHOT
Ajay Gilldi 
 
 
 [address] [facility] [address] 
[phone] Patient: Blessing Hamm MRN: [id number]:[id number] Current Discharge Medication List  
  
ASK your doctor about these medications Dose & Instructions Dispensing Information Comments Morning Noon Evening Bedtime CeleXA 20 mg tablet Generic drug:  citalopram  
   
Your last dose was: Your next dose is:    
   
   
 Dose:  20 mg Take 20 mg by mouth daily. Refills:  0  
     
   
   
   
  
 lisinopril-hydroCHLOROthiazide 10-12.5 mg per tablet Commonly known as:  Lore Lush Your last dose was: Your next dose is:    
   
   
 Dose:  1 Tab Take 1 tablet by mouth daily. Quantity:  30 tablet Refills:  5  
     
   
   
   
  
 metFORMIN 1,000 mg tablet Commonly known as:  GLUCOPHAGE Your last dose was: Your next dose is:    
   
   
 Dose:  1000 mg Take 1 Tab by mouth two (2) times daily (with meals). Indications: type 2 diabetes mellitus Quantity:  30 Tab Refills:  0  
     
   
   
   
  
 multivitamin tablet Commonly known as:  ONE A DAY Your last dose was: Your next dose is:    
   
   
 Dose:  1 Tab Take 1 Tab by mouth daily. Refills:  0  
     
   
   
   
  
 VICTOZA 2-MANDY 0.6 mg/0.1 mL (18 mg/3 mL) Pnij Generic drug:  Liraglutide Your last dose was: Your next dose is:    
   
   
 Dose:  1.6 mg  
1.6 mg by SubCUTAneous route daily. Refills:  0

## 2017-10-11 NOTE — PROCEDURES
Patient:Dulce Trotter   : 1985  Medical Record Number:[id number]            PREPROCEDURE DIAGNOSIS: This patient is preoperative for laparoscopic sleeve gastrectomyprocedure with a history of  reflux disease. POSTPROCEDURE DIAGNOSIS: This patient is preoperative for laparoscopic sleeve gastrectomyprocedure with a history of  reflux disease. PROCEDURES PERFORMED: Upper GI study with barium. ESTIMATED BLOOD LOSS: None. SPECIMENS: None. STATEMENT OF MEDICAL NECESSITY: The patient is a patient with a  longstanding history of obesity. They are now considering the laparoscopic sleeve gastrectomyprocedure as a means of surgical weight control and due to their history of reflux disease and are being assessed preoperatively for such. DESCRIPTION OF PROCEDURE: The patient was brought to the fluoroscopy unit and  was given thin barium. On swallowing of barium, they were noted to have  normal peristalsis of their esophagus. They had prompt filling of distal  esophagus with tapering into the gastroesophageal junction. There was no evidence of a hiatal hernia present. Contrast then filled the gastric cardia, fundus,body and pre pyloric region with no abnormalities noted. Contrast then exited the pylorus in normal fashion. No obstruction was noted. There was no evidence of reflux noted.     (normal anatomy)    Harry De La Vega MD

## 2017-10-17 ENCOUNTER — OFFICE VISIT (OUTPATIENT)
Dept: SURGERY | Age: 32
End: 2017-10-17

## 2017-10-17 VITALS — WEIGHT: 277 LBS | HEIGHT: 70 IN | BODY MASS INDEX: 39.65 KG/M2

## 2017-10-17 DIAGNOSIS — E66.01 MORBID OBESITY WITH BODY MASS INDEX OF 40.0-49.9 (HCC): Primary | ICD-10-CM

## 2017-10-17 NOTE — PROGRESS NOTES
Medical Weight Loss Multi-Disciplinary Program    Name: Lea Tyson   : 1985    Session# 3  Date: 10/17/2017     Height: 5' 10\" (177.8 cm)    Weight: 125.6 kg (277 lb) lbs. Body mass index is 39.75 kg/(m^2). Pounds Gained: 2    Dietary Instructions    Reviewed intake  Understanding low carbohydrates, low sugar, higher protein meals  Understanding proper portions  Instruction given for personal dietary changes  Discussed perceived compliance  Comments: Diet history reviewed and personal dietary changes discussed. Pt given importance of protein diet handout. Physical Activity/Exercise    Discussed Perceived Compliance  Reasonable Goals Set  Motivation  Comments: Pt has not been exercising. She plans to start. Behavior Modification    Achieving/Rewarding goals met  Positive attitude  Discussed perceived compliance  Comments: Pt does not exercise- due to extremely full schedule- works 2 jobs and cares for her daughter. A positive change was that patient incorporated more vegetables this month. She reports she is tired. Discussed how pt continues to get less than 5 hours of sleep for each 24 hour period. Discussed how she cannot continue this pattern without burning out. Discussed the importance of sleep for weight loss and health overall. The patients primary goal this month is to consistently sleep 5-6.5 hours per 24 hour period (with 6.5 hours the ultimate goal). This will involve looking at her schedule and adjusting to promote overall balance. Her goals are to have \"more rest for her body\" and \"have more energy. \" She must sleep more to achieve these.      Candidate for surgery (per RD): pending    Dietitian: Shira Live RD

## 2017-11-13 ENCOUNTER — OFFICE VISIT (OUTPATIENT)
Dept: SURGERY | Age: 32
End: 2017-11-13

## 2017-11-13 ENCOUNTER — CLINICAL SUPPORT (OUTPATIENT)
Dept: SURGERY | Age: 32
End: 2017-11-13

## 2017-11-13 VITALS — BODY MASS INDEX: 38.65 KG/M2 | HEIGHT: 70 IN | WEIGHT: 270 LBS

## 2017-11-13 VITALS
WEIGHT: 270 LBS | OXYGEN SATURATION: 98 % | HEART RATE: 90 BPM | DIASTOLIC BLOOD PRESSURE: 83 MMHG | HEIGHT: 70 IN | SYSTOLIC BLOOD PRESSURE: 113 MMHG | BODY MASS INDEX: 38.65 KG/M2

## 2017-11-13 DIAGNOSIS — E11.9 TYPE 2 DIABETES MELLITUS WITHOUT COMPLICATION, WITHOUT LONG-TERM CURRENT USE OF INSULIN (HCC): ICD-10-CM

## 2017-11-13 DIAGNOSIS — E66.01 MORBID OBESITY WITH BODY MASS INDEX OF 40.0-49.9 (HCC): Primary | ICD-10-CM

## 2017-11-13 DIAGNOSIS — M25.561 ARTHRALGIA OF BOTH KNEES: ICD-10-CM

## 2017-11-13 DIAGNOSIS — I10 ESSENTIAL HYPERTENSION: ICD-10-CM

## 2017-11-13 DIAGNOSIS — M25.562 ARTHRALGIA OF BOTH KNEES: ICD-10-CM

## 2017-11-13 DIAGNOSIS — E66.01 MORBID OBESITY (HCC): Primary | ICD-10-CM

## 2017-11-13 NOTE — PATIENT INSTRUCTIONS
Walking for Exercise: Care Instructions  Your Care Instructions    Walking is one of the easiest ways to get the exercise you need for good health. A brisk, 30-minute walk each day can help you feel better and have more energy. It can help you lower your risk of disease. Walking can help you keep your bones strong and your heart healthy. Check with your doctor before you start a walking plan if you have heart problems, other health issues, or you have not been active in a long time. Follow your doctor's instructions for safe levels of exercise. Follow-up care is a key part of your treatment and safety. Be sure to make and go to all appointments, and call your doctor if you are having problems. It's also a good idea to know your test results and keep a list of the medicines you take. How can you care for yourself at home? Getting started  · Start slowly and set a short-term goal. For example, walk for 5 or 10 minutes every day. · Bit by bit, increase the amount you walk every day. Try for at least 30 minutes on most days of the week. You also may want to swim, bike, or do other activities. · If finding enough time is a problem, it is fine to be active in blocks of 10 minutes or more throughout your day and week. · To get the heart-healthy benefits of walking, you need to walk briskly enough to increase your heart rate and breathing, but not so fast that you cannot talk comfortably. · Wear comfortable shoes that fit well and provide good support for your feet and ankles. Staying with your plan  · After you've made walking a habit, set a longer-term goal. You may want to set a goal of walking briskly for longer or walking farther. Experts say to do 2½ hours of moderate activity a week. A faster heartbeat is what defines moderate-level activity. · To stay motivated, walk with friends, coworkers, or pets. · Use a phone mackenzie or pedometer to track your steps each day. Set a goal to increase your steps.  Once you get there, set a higher goal. Aim for 10,000 steps a day. · If the weather keeps you from walking outside, go for walks at the mall with a friend. Local schools and churches may have indoor gyms where you can walk. Fitting a walk into your workday  · Park several blocks away from work, or get off the bus a few stops early. · Use the stairs instead of the elevator, at least for a few floors. · Suggest holding meetings with colleagues during a walk inside or outside the building. · Use the restroom that is the farthest from your desk or workstation. · Use your morning and afternoon breaks to take quick 15-minute walks. Staying safe  · Know your surroundings. Walk in a well-lighted, safe place. If it is dark, walk with a partner. Wear light-colored clothing. If you can, buy a vest or jacket that reflects light. · Carry a cell phone for emergencies. · Drink plenty of water. Take a water bottle with you when you walk. This is very important if it is hot out. · Be careful not to slip on wet or icy ground. You can buy \"grippers\" for your shoes to help keep you from slipping. · Pay attention to your walking surface. Use sidewalks and paths. · If you have breathing problems like asthma or COPD, ask your doctor when it is safe for you to walk outdoors. Cold, dry air, smog, pollen, or other things in the air could cause breathing problems. Where can you learn more? Go to http://león-irving.info/. Enter R159 in the search box to learn more about \"Walking for Exercise: Care Instructions. \"  Current as of: March 13, 2017  Content Version: 11.4  © 9961-9734 [employer]. Care instructions adapted under license by [employer] (which disclaims liability or warranty for this information).  If you have questions about a medical condition or this instruction, always ask your healthcare professional. [address]  any warranty or liability for your use of this information.

## 2017-11-13 NOTE — PROGRESS NOTES
Bariatric Surgery Consultation    Subjective:     Chava Weiss is a 27 y.o. obese female with a Body mass index is 38.74 kg/(m^2). Katty Barber Chava Weiss desires surgery at this time because of health issues and quality of life issues. Chava Weiss has been seen by a bariatric nutritionist and has been placed on an appropriate low carbohydrate diet. The patient desires laparoscopic sleeve gastrectomy for surgical weight loss, however she is not currently a surgical candidate due to pending work up. Chava Weiss is here today to check progress with weight loss / evaluate nutritional status and review all subspecialty clearances in hopes of proceeding to the operating room. Office visit notes from 3/3/2017 to present have been reviewed. Chava Weiss has increased fluid intake, is focusing on protein, is eating regularly, is taking a multivitamin & has increased her activity. No recent visits with PCP. No new medications. Had UGI on 10/11/2017 that was normal.    Patient Active Problem List    Diagnosis Date Noted    Diabetes ([facility]     Endometrial polyp     Morbid obesity ([facility]     Morbid obesity with body mass index of 40.0-49.9 (HCC)     Hypertension     Irregular menses     Arthritic-like pain     Chronic back pain     PCOS (polycystic ovarian syndrome) 12/03/2014      No past surgical history on file. Social History   Substance Use Topics    Smoking status: Never Smoker    Smokeless tobacco: Never Used    Alcohol use No      Family History   Problem Relation Age of Onset    Diabetes Maternal Aunt     Diabetes Paternal Grandmother     Diabetes Paternal Grandfather     Diabetes Mother       Current Outpatient Prescriptions   Medication Sig Dispense Refill    multivitamin (ONE A DAY) tablet Take 1 Tab by mouth daily.  Liraglutide (VICTOZA 2-MANDY) 0.6 mg/0.1 mL (18 mg/3 mL) pnij 1.6 mg by SubCUTAneous route daily.       metFORMIN (GLUCOPHAGE) 1,000 mg tablet Take 1 Tab by mouth two (2) times daily (with meals). Indications: type 2 diabetes mellitus 30 Tab 0    citalopram (CELEXA) 20 mg tablet Take 20 mg by mouth daily.  lisinopril-hydrochlorothiazide (PRINZIDE, ZESTORETIC) 10-12.5 mg per tablet Take 1 tablet by mouth daily. 30 tablet 5     No Known Allergies       Review of Systems:        General - No history or complaints of unexpected fever, chills, or weight loss  Head/Neck - No history or complaints of headache, diplopia, dysphagia, hearing loss  Cardiac - No history or complaints of chest pain, palpitations, murmur, or shortness of breath  Pulmonary - No history or complaints of shortness of breath, productive cough, hemoptysis  Gastrointestinal - No history or complaints of reflux,  abdominal pain, obstipation/constipation, blood per rectum  Genitourinary - No history or complaints of hematuria/dysuria, stress urinary incontinence symptoms, or renal lithiasis  Musculoskeletal - No history or complaints of joint pain or muscular weakness  Hematologic - No history or complaints of bleeding disorders, blood transfusions, sickle cell anemia  Neurologic - No history or complaints of  migraine headaches, seizure activity, syncopal episodes, TIA or stroke  Integumentary - No history or complaints of rashes, abnormal nevi, skin cancer  Gynecological - No history of heavy menses/abnormal menses    Objective:     Visit Vitals    /83 (BP 1 Location: Left arm, BP Patient Position: Sitting)    Pulse 90    Ht 5' 10\" (1.778 m)    Wt 122.5 kg (270 lb)    SpO2 98%    BMI 38.74 kg/m2       Physical Exam:    General:  alert, cooperative, no distress, appears stated age. Very overweight.    Lungs:   clear to auscultation bilaterally   Heart:  Regular rate and rhythm, S1S2 present or without murmur or extra heart sounds   Abdomen:   abdomen is soft without significant tenderness, masses, organomegaly or guarding, abdomen is soft without tenderness, masses, organomegaly or guarding; Active bowel sounds all 4 quadrants. Severe central obesity         Labs:     No results found for this or any previous visit (from the past 2016 hour(s)). Assessment:     Morbid obesity with associated comorbidity of DM & HTN, and severe central obesity & outstanding insurance requirements. Plan: To continue current medications & routine follow-up with PCP. Continuation of Pre-Operative evaluation / clearance:  Cristal Whalen has returned to the office today to discuss her status as a surgical candidate. Progress has been noted and reviewed - including review of notes from dietician. Cristal Whalen is being compliant with follow-up & recommendations. Cristal Whalen has 0 more pounds to lose before proceeding to the OR. (5 pounds lost since last visit)  Recommend dietary consult prior to surgery. Cristal Whalen has no outstanding clearance to review before proceeding to the OR. Cristal Whalen will return in 1 month to continue the pre-operative process and to work towards goals as outlined. Cristal Whalen understand the rationales for all the above and plans to follow the diet & activity recommendations of the dietician. It has been discussed that given her obese condition that the best surgical option for this patient would be the laparoscopic sleeve gastrectomy. Cristal Whalen agrees with the surgical choice and has been educated in it's; risks, benefits, and alternatives. We will continue with the pre-operative evaluation as needed to check progress. Secondary Diagnoses:     Dietary Intervention  - The patient is currently followed by a bariatric nutritionist for an attempt at preoperative weight loss as has been dictated by their insurance carrier. They will be assessed at various times during their follow up to evaluate their progress depending on the length of time that is required once again by their carrier.   I have explained the importance of preoperative weight loss and the benefits regarding lower surgical risk and also assisting the patient in reaching their weight loss goal.  Finally they understand there is a physiologic benefit from the standpoint of hepatic volume reduction preoperatively. I have reiterated the importance of a low carbohydrate and high protein regimen to achieve their stated goal.    Adult Onset Diabetes - The patient has lisa given a very low carbohydrate diet preoperatively along with instructions to monitor their blood sugars on a regular daily basis. When  their surgery is performed  we will be monitoring the patient with sliding scale insulin and accuchecks.  Based on those values we will determine whether the patient needs a reduction of those medications postoperatively or total removal of those medications on discharge.  We will have the patient continue accuchecks postoperatively while at home also and report to me or their family physician for appropriate adjustments as needed.  The patient also understands that in the event of uncontrolled blood sugar preoperatively that we may choose to postpone their surgery. Weight Related Arthritis -The patient understands the benefits that weight loss surgery can have on their arthritis but also understands that weight loss is not a guaranteed cure and relief of symptoms is often dependent on the severity of the underlying disease. The patient also understands that traditional pharmaceutical treatments for this diagnosis are usually unavailable to post-operative weight loss patients due to the effects on the gastrointestinal tract. Any changes to the patients medication treatment will ultimately be made the patients PCP with input by our office. Hypertension - The patient has a clear understanding of how weight loss improves hypertension as a whole, but also they understand that there is a significant genetic component to this disease process.  We will monitor the patients blood pressure while in the hospital and the plan would be to continue those medications postoperatively.  If a diuretic is being used we will stop them on discharge to prevent dehydration particularly with the sleeve gastrectomy and the gastric bypass procedures.  They will be instructed to monitor their blood pressure postoperatively while at home and notify their primary care physician in the event of any significantly high or uncharacteristic readings. Ms. Donny Mack has a reminder for a \"due or due soon\" health maintenance. I have asked that she contact her primary care provider for follow-up on this health maintenance.       Signed By: Lizandro Godinez NP     November 13, 2017

## 2017-11-13 NOTE — PATIENT INSTRUCTIONS
Goals: 1. Work on finding a protein shake you can use as a meal replacement or snack - try the pre-made premier protein shake - can also find an unflavored protein powder (20-30 grams of protein per serving) that you can put into an sugar free/diet beverage you like (don't recommend putting it into plain water) - can put premier pre-made shake or [employer]'s equate brand protein shake into your more decaf coffee   2. Continue to work on adding in more cadio to your exercise routine while also continuing with weight lifting   3.  Continue drinking at least 64 ounces of fluid a day

## 2017-11-13 NOTE — PROGRESS NOTES
Medical Weight Loss Multi-Disciplinary Program    Name: Blessing Hamm   : 1985    Session# 4  Date: 2017     Height: 5' 10\" (177.8 cm)    Weight: 122.5 kg (270 lb) lbs. Body mass index is 38.74 kg/(m^2). Pounds Lost: 7     Dietary Instructions    Reviewed intake  Dining outside home  Instruction given for personal dietary changes  Discussed perceived compliance  Comments: Reviewed patient's past monthly diet hx. Pt is working on having vegetables at at least 2 meals a day; pt. Works at night, so when she gets off she goes to bed - only eats lunch and dinner, but she drinks a lot of water - working on trying to incorporate a protein shake into her daily diet. Pt is drinking vanilla premier protein shake - powder - pt is not liking the flavor but is going to try putting into her coffee in the morning. Pt is also working on increasing her hours of sleep - trying to lay down and rest and relax throughout her day in order to get more sleep     Physical Activity/Exercise    Reviewed Activity Log  Discussed Perceived Compliance  Reasonable Goals Set  Motivation  Comments: Pt has been walking more - parking further away from places, taking the stairs over the elevator - trying to get her cardio back up - lifts weights a lot, but Is trying to incorporate more cardio into her routine     Behavior Modification    Reviewed behavior modification log  Identify obstacles to trigger change  Achieving/Rewarding goals met  Positive attitude  Discussed perceived compliance  Comments:     Goals:  1. Work on finding a protein shake you can use as a meal replacement or snack - try the pre-made premier protein shake - can also find an unflavored protein powder (20-30 grams of protein per serving) that you can put into an sugar free/diet beverage you like (don't recommend putting it into plain water) - can put premier pre-made shake or [employer]'s equate brand protein shake into your more decaf coffee   2.  Continue to work on adding in more cadio to your exercise routine while also continuing with weight lifting   3.  Continue drinking at least 64 ounces of fluid a day     Candidate for surgery (per RD):Yes    Dietitian: Ksenia Uriostegui

## 2017-11-22 DIAGNOSIS — E11.9 TYPE 2 DIABETES MELLITUS WITHOUT COMPLICATION, WITHOUT LONG-TERM CURRENT USE OF INSULIN (HCC): Primary | ICD-10-CM

## 2017-11-22 DIAGNOSIS — I10 ESSENTIAL HYPERTENSION: ICD-10-CM

## 2017-11-22 DIAGNOSIS — E66.01 MORBID OBESITY WITH BODY MASS INDEX OF 40.0-49.9 (HCC): ICD-10-CM

## 2017-11-22 DIAGNOSIS — Z01.812 BLOOD TESTS PRIOR TO TREATMENT OR PROCEDURE: ICD-10-CM

## 2017-12-01 RX ORDER — OXYCODONE AND ACETAMINOPHEN 5; 325 MG/1; MG/1
1 TABLET ORAL
Qty: 30 TAB | Refills: 0 | Status: SHIPPED | OUTPATIENT
Start: 2017-12-01 | End: 2017-12-04 | Stop reason: ALTCHOICE

## 2017-12-01 RX ORDER — PHENOL/SODIUM PHENOLATE
20 AEROSOL, SPRAY (ML) MUCOUS MEMBRANE DAILY
Qty: 30 TAB | Refills: 1 | Status: SHIPPED | OUTPATIENT
Start: 2017-12-01 | End: 2017-12-28 | Stop reason: ALTCHOICE

## 2017-12-02 ENCOUNTER — HOSPITAL ENCOUNTER (EMERGENCY)
Age: 32
Discharge: HOME | End: 2017-12-02
Attending: EMERGENCY MEDICINE | Admitting: EMERGENCY MEDICINE
Payer: COMMERCIAL

## 2017-12-02 ENCOUNTER — APPOINTMENT (OUTPATIENT)
Dept: GENERAL RADIOLOGY | Age: 32
End: 2017-12-02
Attending: EMERGENCY MEDICINE
Payer: COMMERCIAL

## 2017-12-02 VITALS
RESPIRATION RATE: 14 BRPM | HEART RATE: 84 BPM | DIASTOLIC BLOOD PRESSURE: 80 MMHG | SYSTOLIC BLOOD PRESSURE: 126 MMHG | OXYGEN SATURATION: 95 % | TEMPERATURE: 98 F

## 2017-12-02 DIAGNOSIS — J20.9 ACUTE BRONCHITIS, UNSPECIFIED ORGANISM: ICD-10-CM

## 2017-12-02 DIAGNOSIS — T50.901A ACCIDENTAL DRUG OVERDOSE, INITIAL ENCOUNTER: Primary | ICD-10-CM

## 2017-12-02 LAB
ANION GAP SERPL CALC-SCNC: 6 MMOL/L (ref 3–18)
BASOPHILS # BLD: 0.1 K/UL (ref 0–0.1)
BASOPHILS NFR BLD: 0 % (ref 0–2)
BUN SERPL-MCNC: 9 MG/DL (ref 7–18)
BUN/CREAT SERPL: 12 (ref 12–20)
CALCIUM SERPL-MCNC: 9.2 MG/DL (ref 8.5–10.1)
CHLORIDE SERPL-SCNC: 98 MMOL/L (ref 100–108)
CO2 SERPL-SCNC: 29 MMOL/L (ref 21–32)
CREAT SERPL-MCNC: 0.76 MG/DL (ref 0.6–1.3)
DIFFERENTIAL METHOD BLD: ABNORMAL
EOSINOPHIL # BLD: 0.7 K/UL (ref 0–0.4)
EOSINOPHIL NFR BLD: 4 % (ref 0–5)
ERYTHROCYTE [DISTWIDTH] IN BLOOD BY AUTOMATED COUNT: 13.9 % (ref 11.6–14.5)
GLUCOSE SERPL-MCNC: 223 MG/DL (ref 74–99)
HCT VFR BLD AUTO: 40.3 % (ref 35–45)
HGB BLD-MCNC: 13.6 G/DL (ref 12–16)
LYMPHOCYTES # BLD: 5.1 K/UL (ref 0.9–3.6)
LYMPHOCYTES NFR BLD: 31 % (ref 21–52)
MCH RBC QN AUTO: 27.9 PG (ref 24–34)
MCHC RBC AUTO-ENTMCNC: 33.7 G/DL (ref 31–37)
MCV RBC AUTO: 82.8 FL (ref 74–97)
MONOCYTES # BLD: 1 K/UL (ref 0.05–1.2)
MONOCYTES NFR BLD: 6 % (ref 3–10)
NEUTS SEG # BLD: 9.5 K/UL (ref 1.8–8)
NEUTS SEG NFR BLD: 59 % (ref 40–73)
PLATELET # BLD AUTO: 391 K/UL (ref 135–420)
PMV BLD AUTO: 9.9 FL (ref 9.2–11.8)
POTASSIUM SERPL-SCNC: 4 MMOL/L (ref 3.5–5.5)
RBC # BLD AUTO: 4.87 M/UL (ref 4.2–5.3)
SODIUM SERPL-SCNC: 133 MMOL/L (ref 136–145)
WBC # BLD AUTO: 16.4 K/UL (ref 4.6–13.2)

## 2017-12-02 PROCEDURE — 85025 COMPLETE CBC W/AUTO DIFF WBC: CPT | Performed by: EMERGENCY MEDICINE

## 2017-12-02 PROCEDURE — 74011250636 HC RX REV CODE- 250/636: Performed by: EMERGENCY MEDICINE

## 2017-12-02 PROCEDURE — 96360 HYDRATION IV INFUSION INIT: CPT

## 2017-12-02 PROCEDURE — 99284 EMERGENCY DEPT VISIT MOD MDM: CPT

## 2017-12-02 PROCEDURE — 80048 BASIC METABOLIC PNL TOTAL CA: CPT | Performed by: EMERGENCY MEDICINE

## 2017-12-02 PROCEDURE — 71010 XR CHEST PORT: CPT

## 2017-12-02 RX ORDER — AZITHROMYCIN 250 MG/1
250 TABLET, FILM COATED ORAL SEE ADMIN INSTRUCTIONS
Qty: 6 TAB | Refills: 0 | Status: ON HOLD | OUTPATIENT
Start: 2017-12-02 | End: 2017-12-14

## 2017-12-02 RX ADMIN — SODIUM CHLORIDE 1000 ML: 9 INJECTION, SOLUTION INTRAVENOUS at 03:18

## 2017-12-02 NOTE — ED NOTES
I have reviewed discharge instructions with the patient. The patient verbalized understanding. Discharge medications reviewed with patient and appropriate educational materials and side effects teaching were provided. Pt ambulated out of ED with steady gait, accompanied by her spouse.

## 2017-12-02 NOTE — ED PROVIDER NOTES
EMERGENCY DEPARTMENT HISTORY AND PHYSICAL EXAM    3:16 AM      Date: 12/2/2017  Patient Name: Anahi Travis    History of Presenting Illness     No chief complaint on file. History Provided By: Patient    Chief Complaint: medication overdose   Duration: 1 Days  Timing:  N/A  Location: [facility]   Quality: N/A  Severity: Mild  Modifying Factors: none  Associated Symptoms: dizziness, confusion, weak and nauseous      Additional History (Context): Anahi Travis is a 27 y.o. female with DM, PCOS and depression  who presents to the ED with a chief complaint of possible medication overdose since yesterday afternoon. Pt states \"I didn't count, but I took too much medication, I thought I took 2, but it was 3. \" Pt also states she started by taking too much Ambien, then Metformin and then Lisinopril and other medications. Pt reports that she fell asleep after taking the medication and was woken up by her daughter. Pt also reports that after waking up she felt dizzy, confused, lightheaded, tired, weak and nauseous. Pt states she had work yesterday and also ingested a 5-hour energy drink. Patient denies any SI, HI, chills, cough, SOB, abdominal pain, headache, dysuria, diaphoresis, syncope or any other symptoms or complaints. PCP: Raeann Hines MD    Current Facility-Administered Medications   Medication Dose Route Frequency Provider Last Rate Last Dose    sodium chloride 0.9 % bolus infusion 1,000 mL  1,000 mL IntraVENous ONCE Marilee Padilla MD         Current Outpatient Prescriptions   Medication Sig Dispense Refill    ergocalciferol (VITAMIN D2) 50,000 unit capsule Take 50,000 Units by mouth every seven (7) days. Indications: Vitamin D Deficiency      glipiZIDE (GLUCOTROL) 10 mg tablet Take 10 mg by mouth daily. Indications: type 2 diabetes mellitus      oxyCODONE-acetaminophen (PERCOCET) 5-325 mg per tablet Take 1 Tab by mouth every four (4) hours as needed for Pain. Max Daily Amount: 6 Tabs.  30 Tab 0  Omeprazole delayed release (PRILOSEC D/R) 20 mg tablet Take 1 Tab by mouth daily. OTC 30 Tab 1    multivitamin (ONE A DAY) tablet Take 1 Tab by mouth daily.  metFORMIN (GLUCOPHAGE) 1,000 mg tablet Take 1 Tab by mouth two (2) times daily (with meals). Indications: type 2 diabetes mellitus 30 Tab 0    citalopram (CELEXA) 20 mg tablet Take 20 mg by mouth daily. Indications: ANXIETY WITH DEPRESSION      lisinopril-hydrochlorothiazide (PRINZIDE, ZESTORETIC) 10-12.5 mg per tablet Take 1 tablet by mouth daily. (Patient taking differently: Take 1 Tab by mouth daily. Indications: kidney perfusion) 30 tablet 5       Past History     Past Medical History:  Past Medical History:   Diagnosis Date    Arthritic-like pain     knees and elbows    Chronic back pain     has required steroid injections    Diabetes ([address]     Dx 2005    Endometrial polyp     Irregular menses     [facility] [address] [facility] [address]     Morbid obesity with body mass index of 40.0-49.9 (HCC)     PCOS (polycystic ovarian syndrome)     Endometriosis    Psychiatric disorder     depression       Past Surgical History:  Past Surgical History:   Procedure Laterality Date    HX GYN  03/31/2017    D&C       Family History:  Family History   Problem Relation Age of Onset    Diabetes Maternal Aunt     Diabetes Paternal Grandmother     Diabetes Paternal Grandfather     Diabetes Mother        Social History:  Social History   Substance Use Topics    Smoking status: Never Smoker    Smokeless tobacco: Never Used    Alcohol use 0.6 oz/week     1 Shots of liquor per week       Allergies:  No Known Allergies      Review of Systems       Review of Systems   Constitutional: Positive for fatigue. Medication overdose   Gastrointestinal: Positive for nausea. Neurological: Positive for dizziness, weakness and light-headedness. Psychiatric/Behavioral: Positive for confusion. Negative for hallucinations, self-injury and suicidal ideas.  The patient is not hyperactive. All other systems reviewed and are negative. Physical Exam     Visit Vitals    LMP 08/01/2017         Physical Exam   Constitutional: She is oriented to person, place, and time. She appears well-developed. HENT:   Head: Normocephalic and atraumatic. Eyes: Conjunctivae and EOM are normal.   Neck: Normal range of motion. Cardiovascular: Normal heart sounds. Exam reveals no gallop and no friction rub. No murmur heard. Pulmonary/Chest: Effort normal and breath sounds normal. No stridor. Abdominal: Soft. There is no tenderness. Musculoskeletal: Normal range of motion. She exhibits no tenderness. Neurological: She is alert and oriented to person, place, and time. Skin: Skin is warm and dry. She is not diaphoretic. Psychiatric: She has a normal mood and affect. Her behavior is normal.   Nursing note and vitals reviewed. Diagnostic Study Results     Labs -  No results found for this or any previous visit (from the past 12 hour(s)). Medical Decision Making     Provider Notes (Medical Decision Making): Here with mild one pill overdose of her usual meds. Will observe here, obtain labs, and re eval.           I am the first provider for this patient. I reviewed the vital signs, available nursing notes, past medical history, past surgical history, family history and social history. Vital Signs-Reviewed the patient's vital signs. Records Reviewed: Nursing Notes and Old Medical Records (Time of Review: 3:16 AM)    ED Course: Progress Notes, Reevaluation, and Consults:  -Re-evaluted the patient - feeling better after hydration  -Results including mild leukocytosis with either bronchitis vs R PNA on CXR were discussed and reviewed with pt who understood the implications. Otherwise reassuring results     -We discussed the diagnosis, treatment, and plan. Next steps in close outpt care include: z pack, PMD follow up, assistance with meds to prevent further accidental OD. -They verbally convey understanding and agreement of the signs, symptoms, diagnosis, treatment and prognosis and additionally agree to follow up as discussed. All questions were answered, and we reviewed pertinent return precautions as seen in the discharge paperwork. Pt understood follow up instructions, and would return to the ED if any worsening or concerns. Andres Acevedo MD  5:01 AM      Diagnosis     Clinical Impression:   1. Accidental drug overdose, initial encounter    2. Acute bronchitis, unspecified organism        Disposition: Discharge     Follow-up Information     None           Patient's Medications   Start Taking    No medications on file   Continue Taking    CITALOPRAM (CELEXA) 20 MG TABLET    Take 20 mg by mouth daily. Indications: ANXIETY WITH DEPRESSION    ERGOCALCIFEROL (VITAMIN D2) 50,000 UNIT CAPSULE    Take 50,000 Units by mouth every seven (7) days. Indications: Vitamin D Deficiency    GLIPIZIDE (GLUCOTROL) 10 MG TABLET    Take 10 mg by mouth daily. Indications: type 2 diabetes mellitus    LISINOPRIL-HYDROCHLOROTHIAZIDE (PRINZIDE, ZESTORETIC) 10-12.5 MG PER TABLET    Take 1 tablet by mouth daily. METFORMIN (GLUCOPHAGE) 1,000 MG TABLET    Take 1 Tab by mouth two (2) times daily (with meals). Indications: type 2 diabetes mellitus    MULTIVITAMIN (ONE A DAY) TABLET    Take 1 Tab by mouth daily. OMEPRAZOLE DELAYED RELEASE (PRILOSEC D/R) 20 MG TABLET    Take 1 Tab by mouth daily. OTC    OXYCODONE-ACETAMINOPHEN (PERCOCET) 5-325 MG PER TABLET    Take 1 Tab by mouth every four (4) hours as needed for Pain. Max Daily Amount: 6 Tabs.    These Medications have changed    No medications on file   Stop Taking    No medications on file     _______________________________    Attestations:  [id number] acting as a scribe for and in the presence of Andres Acevedo MD      December 02, 2017 at 3:16 AM       Provider Attestation:      I personally performed the services described in the documentation, reviewed the documentation, as recorded by the scribe in my presence, and it accurately and completely records my words and actions.  December 02, 2017 at 3:16 AM - Alana Hunt MD    _______________________________

## 2017-12-02 NOTE — ED TRIAGE NOTES
Pt arrived by EMS for c/o dizziness and lethargy after possibly taking an extra Ambien, pt unsure if she took an extra dose or not. Pt denies chest pain and SOB.

## 2017-12-02 NOTE — DISCHARGE INSTRUCTIONS
Bronchitis: Care Instructions  Your Care Instructions    Bronchitis is inflammation of the bronchial tubes, which carry air to the lungs. The tubes swell and produce mucus, or phlegm. The mucus and inflamed bronchial tubes make you cough. You may have trouble breathing. Most cases of bronchitis are caused by viruses like those that cause colds. Antibiotics usually do not help and they may be harmful. Bronchitis usually develops rapidly and lasts about 2 to 3 weeks in otherwise healthy people. Follow-up care is a key part of your treatment and safety. Be sure to make and go to all appointments, and call your doctor if you are having problems. It's also a good idea to know your test results and keep a list of the medicines you take. How can you care for yourself at home? · Take all medicines exactly as prescribed. Call your doctor if you think you are having a problem with your medicine. · Get some extra rest.  · Take an over-the-counter pain medicine, such as acetaminophen (Tylenol), ibuprofen (Advil, Motrin), or naproxen (Aleve) to reduce fever and relieve body aches. Read and follow all instructions on the label. · Do not take two or more pain medicines at the same time unless the doctor told you to. Many pain medicines have acetaminophen, which is Tylenol. Too much acetaminophen (Tylenol) can be harmful. · Take an over-the-counter cough medicine that contains dextromethorphan to help quiet a dry, hacking cough so that you can sleep. Avoid cough medicines that have more than one active ingredient. Read and follow all instructions on the label. · Breathe moist air from a humidifier, hot shower, or sink filled with hot water. The heat and moisture will thin mucus so you can cough it out. · Do not smoke. Smoking can make bronchitis worse. If you need help quitting, talk to your doctor about stop-smoking programs and medicines. These can increase your chances of quitting for good.   When should you call for help? Call 911 anytime you think you may need emergency care. For example, call if:  ? · You have severe trouble breathing. ?Call your doctor now or seek immediate medical care if:  ? · You have new or worse trouble breathing. ? · You cough up dark brown or bloody mucus (sputum). ? · You have a new or higher fever. ? · You have a new rash. ? Watch closely for changes in your health, and be sure to contact your doctor if:  ? · You cough more deeply or more often, especially if you notice more mucus or a change in the color of your mucus. ? · You are not getting better as expected. Where can you learn more? Go to http://león-irving.info/. Enter H333 in the search box to learn more about \"Bronchitis: Care Instructions. \"  Current as of: May 12, 2017  Content Version: 11.4  © 5356-8356 [employer]. Care instructions adapted under license by [employer] (which disclaims liability or warranty for this information). If you have questions about a medical condition or this instruction, always ask your healthcare professional. [place] [address] any warranty or liability for your use of this information. Accidental Overdose of Medicine: Care Instructions  Your Care Instructions    Almost any medicine can cause harm if you take too much of it. You have had treatment to help your body get rid of an overdose of a medicine. This may have been an over-the-counter medicine. Or it might be one that a doctor prescribed. It may even have been a vitamin or a supplement. During treatment, the doctor may have given you fluids and medicine. You also may have had lab tests. Then the doctor made sure that you were well enough to go home. The doctor has checked you carefully, but problems can develop later. If you notice any problems or new symptoms, get medical treatment right away. Follow-up care is a key part of your treatment and safety.  Be sure to make and go to all appointments, and call your doctor if you are having problems. It's also a good idea to know your test results and keep a list of the medicines you take. How can you care for yourself at home? Home care  · Drink plenty of fluids. If you have kidney, heart, or liver disease and have to limit fluids, talk with your doctor before you increase the amount of fluids you drink. · If you normally take medicines, ask your doctor when you can start taking them again. · Read the information that comes with any medicine. If you have questions, ask your doctor or pharmacist.  Prevention  · Be safe with medicines. Take your medicines exactly as prescribed or as the label directs. Call your doctor if you think you are having a problem with your medicine. · Keep your medicines in the containers they came in. Keep them with the original labels. · Find out what to do if you miss a dose of your medicine. When should you call for help? Call 911 anytime you think you may need emergency care. For example, call if:  ? · You passed out (lost consciousness). ? · You have trouble breathing. ? · You are sleepy or hard to wake up. ?Call your doctor now or seek immediate medical care if:  ? · You are vomiting. ? · You have a new or worse headache. ? · You are dizzy or lightheaded or feel like you may faint. ? Watch closely for changes in your health, and be sure to contact your doctor if:  ? · You do not get better as expected. Where can you learn more? Go to http://león-irving.info/. Enter C165 in the search box to learn more about \"Accidental Overdose of Medicine: Care Instructions. \"  Current as of: August 14, 2016  Content Version: 11.4  © 2464-7008 [employer]. Care instructions adapted under license by [employer] (which disclaims liability or warranty for this information).  If you have questions about a medical condition or this instruction, always ask your healthcare professional. [place] [address] any warranty or liability for your use of this information.

## 2017-12-04 ENCOUNTER — OFFICE VISIT (OUTPATIENT)
Dept: SURGERY | Age: 32
End: 2017-12-04

## 2017-12-04 ENCOUNTER — HOSPITAL ENCOUNTER (OUTPATIENT)
Dept: PREADMISSION TESTING | Age: 32
Discharge: HOME | End: 2017-12-04
Attending: SPECIALIST
Payer: COMMERCIAL

## 2017-12-04 VITALS
HEIGHT: 70 IN | RESPIRATION RATE: 16 BRPM | BODY MASS INDEX: 39.51 KG/M2 | HEART RATE: 71 BPM | DIASTOLIC BLOOD PRESSURE: 82 MMHG | OXYGEN SATURATION: 100 % | SYSTOLIC BLOOD PRESSURE: 130 MMHG | WEIGHT: 276 LBS

## 2017-12-04 DIAGNOSIS — M25.561 ARTHRALGIA OF BOTH KNEES: ICD-10-CM

## 2017-12-04 DIAGNOSIS — G89.29 CHRONIC BACK PAIN, UNSPECIFIED BACK LOCATION, UNSPECIFIED BACK PAIN LATERALITY: ICD-10-CM

## 2017-12-04 DIAGNOSIS — I10 ESSENTIAL HYPERTENSION: ICD-10-CM

## 2017-12-04 DIAGNOSIS — E28.2 PCOS (POLYCYSTIC OVARIAN SYNDROME): ICD-10-CM

## 2017-12-04 DIAGNOSIS — E66.01 MORBID OBESITY WITH BODY MASS INDEX OF 40.0-49.9 (HCC): Primary | ICD-10-CM

## 2017-12-04 DIAGNOSIS — E66.01 MORBID OBESITY (HCC): ICD-10-CM

## 2017-12-04 DIAGNOSIS — N92.6 IRREGULAR MENSES: ICD-10-CM

## 2017-12-04 DIAGNOSIS — M54.9 CHRONIC BACK PAIN, UNSPECIFIED BACK LOCATION, UNSPECIFIED BACK PAIN LATERALITY: ICD-10-CM

## 2017-12-04 DIAGNOSIS — E11.9 TYPE 2 DIABETES MELLITUS WITHOUT COMPLICATION, WITHOUT LONG-TERM CURRENT USE OF INSULIN (HCC): ICD-10-CM

## 2017-12-04 DIAGNOSIS — M25.562 ARTHRALGIA OF BOTH KNEES: ICD-10-CM

## 2017-12-04 LAB
ABO + RH BLD: NORMAL
ALBUMIN SERPL-MCNC: 3.8 G/DL (ref 3.4–5)
ALBUMIN/GLOB SERPL: 1.1 {RATIO} (ref 0.8–1.7)
ALP SERPL-CCNC: 83 U/L (ref 45–117)
ALT SERPL-CCNC: 25 U/L (ref 13–56)
ANION GAP SERPL CALC-SCNC: 8 MMOL/L (ref 3–18)
AST SERPL-CCNC: 10 U/L (ref 15–37)
BASOPHILS # BLD: 0.1 K/UL (ref 0–0.06)
BASOPHILS NFR BLD: 0 % (ref 0–2)
BILIRUB SERPL-MCNC: 0.3 MG/DL (ref 0.2–1)
BLOOD GROUP ANTIBODIES SERPL: NORMAL
BUN SERPL-MCNC: 6 MG/DL (ref 7–18)
BUN/CREAT SERPL: 8 (ref 12–20)
CALCIUM SERPL-MCNC: 9.4 MG/DL (ref 8.5–10.1)
CHLORIDE SERPL-SCNC: 103 MMOL/L (ref 100–108)
CO2 SERPL-SCNC: 29 MMOL/L (ref 21–32)
CREAT SERPL-MCNC: 0.71 MG/DL (ref 0.6–1.3)
DIFFERENTIAL METHOD BLD: ABNORMAL
EOSINOPHIL # BLD: 0.7 K/UL (ref 0–0.4)
EOSINOPHIL NFR BLD: 5 % (ref 0–5)
ERYTHROCYTE [DISTWIDTH] IN BLOOD BY AUTOMATED COUNT: 14.1 % (ref 11.6–14.5)
EST. AVERAGE GLUCOSE BLD GHB EST-MCNC: 183 MG/DL
GLOBULIN SER CALC-MCNC: 3.6 G/DL (ref 2–4)
GLUCOSE SERPL-MCNC: 110 MG/DL (ref 74–99)
HBA1C MFR BLD: 8 % (ref 4.5–5.6)
HCT VFR BLD AUTO: 36.4 % (ref 35–45)
HGB BLD-MCNC: 12 G/DL (ref 12–16)
LYMPHOCYTES # BLD: 4.8 K/UL (ref 0.9–3.6)
LYMPHOCYTES NFR BLD: 35 % (ref 21–52)
MCH RBC QN AUTO: 27.3 PG (ref 24–34)
MCHC RBC AUTO-ENTMCNC: 33 G/DL (ref 31–37)
MCV RBC AUTO: 82.9 FL (ref 74–97)
MONOCYTES # BLD: 0.7 K/UL (ref 0.05–1.2)
MONOCYTES NFR BLD: 5 % (ref 3–10)
NEUTS SEG # BLD: 7.5 K/UL (ref 1.8–8)
NEUTS SEG NFR BLD: 55 % (ref 40–73)
PLATELET # BLD AUTO: 317 K/UL (ref 135–420)
PMV BLD AUTO: 8.9 FL (ref 9.2–11.8)
POTASSIUM SERPL-SCNC: 4.1 MMOL/L (ref 3.5–5.5)
PROT SERPL-MCNC: 7.4 G/DL (ref 6.4–8.2)
RBC # BLD AUTO: 4.39 M/UL (ref 4.2–5.3)
SODIUM SERPL-SCNC: 140 MMOL/L (ref 136–145)
SPECIMEN EXP DATE BLD: NORMAL
WBC # BLD AUTO: 13.8 K/UL (ref 4.6–13.2)

## 2017-12-04 PROCEDURE — 36415 COLL VENOUS BLD VENIPUNCTURE: CPT | Performed by: SPECIALIST

## 2017-12-04 PROCEDURE — 80053 COMPREHEN METABOLIC PANEL: CPT | Performed by: SPECIALIST

## 2017-12-04 PROCEDURE — 83036 HEMOGLOBIN GLYCOSYLATED A1C: CPT | Performed by: SPECIALIST

## 2017-12-04 PROCEDURE — 86900 BLOOD TYPING SEROLOGIC ABO: CPT | Performed by: SPECIALIST

## 2017-12-04 PROCEDURE — 93005 ELECTROCARDIOGRAM TRACING: CPT

## 2017-12-04 PROCEDURE — 85025 COMPLETE CBC W/AUTO DIFF WBC: CPT | Performed by: SPECIALIST

## 2017-12-04 RX ORDER — HYDROMORPHONE HYDROCHLORIDE 2 MG/1
2 TABLET ORAL
Qty: 30 TAB | Refills: 0 | Status: SHIPPED | OUTPATIENT
Start: 2017-12-04 | End: 2017-12-28 | Stop reason: ALTCHOICE

## 2017-12-04 NOTE — PROGRESS NOTES
Sleeve Gastrectomy - History and Physical    Subjective: The patient is a 27 y.o. obese female with a Body mass index is 39.6 kg/(m^2). .   she presents now to review their work up to date to see if they are a candidate for surgery and whether or not to proceed with the previously requested procedure. Bariatric comorbidities continue to include:   Patient Active Problem List   Diagnosis Code    PCOS (polycystic ovarian syndrome) E28.2    Diabetes ([facility] [address] E11.9    Endometrial polyp N84.0    Morbid obesity ([facility] [address] E66.01    Morbid obesity with body mass index of 40.0-49.9 ([facility]) E66.01    Hypertension I10    Irregular menses N92.6    Arthritic-like pain M25.50    Chronic back pain M54.9, G89.29       They have been generally well prior to this visit and have had no recent significant illnesses. The patient has had no gastrointestinal issues that would preclude them from proceeding with the surgery they have chosen. Stacie Lawsonccasin has recently tried a preoperative weight loss program  in addition to seeing a bariatric nutritionist preoperatively. We have discussed on at least one other occasion about the various types of surgical weight loss procedures and they have considered these options after our initial consultation. We have once again discussed these procedures in detail and they have now decided on a surgical procedure. They present today to discuss this and confirm that their evaluation pre operatively is acceptable to continue with surgery. The patient desires laparoscopic sleeve gastrectomy for surgical weight loss. The patients goal weight is 170lb. These goals are consistent with expected outcomes of their desired operation. her Medical goals are resolution of these health issues.     Patient Active Problem List    Diagnosis Date Noted    Diabetes ([facility] [address]     Endometrial polyp     Morbid obesity ([facility] [address]     Morbid obesity with body mass index of 40.0-49.9 ([facility] [address]     Hypertension     Irregular menses     Arthritic-like pain     Chronic back pain     PCOS (polycystic ovarian syndrome) 12/03/2014     Past Surgical History:   Procedure Laterality Date    HX GYN  03/31/2017    D&C      Social History   Substance Use Topics    Smoking status: Never Smoker    Smokeless tobacco: Never Used    Alcohol use 0.6 oz/week     1 Shots of liquor per week      Family History   Problem Relation Age of Onset    Diabetes Maternal Aunt     Diabetes Paternal Grandmother     Diabetes Paternal Grandfather     Diabetes Mother       Current Outpatient Prescriptions   Medication Sig Dispense Refill    azithromycin (ZITHROMAX Z-MANDY) 250 mg tablet Take 1 Tab by mouth See Admin Instructions. Take 2 pills by mouth on the first day then 1 pill by mouth daily until done 6 Tab 0    ergocalciferol (VITAMIN D2) 50,000 unit capsule Take 50,000 Units by mouth every seven (7) days. Indications: Vitamin D Deficiency      glipiZIDE (GLUCOTROL) 10 mg tablet Take 10 mg by mouth daily. Indications: type 2 diabetes mellitus      multivitamin (ONE A DAY) tablet Take 1 Tab by mouth daily.  metFORMIN (GLUCOPHAGE) 1,000 mg tablet Take 1 Tab by mouth two (2) times daily (with meals). Indications: type 2 diabetes mellitus 30 Tab 0    citalopram (CELEXA) 20 mg tablet Take 20 mg by mouth daily. Indications: ANXIETY WITH DEPRESSION      lisinopril-hydrochlorothiazide (PRINZIDE, ZESTORETIC) 10-12.5 mg per tablet Take 1 tablet by mouth daily. (Patient taking differently: Take 1 Tab by mouth daily. Indications: kidney perfusion) 30 tablet 5    oxyCODONE-acetaminophen (PERCOCET) 5-325 mg per tablet Take 1 Tab by mouth every four (4) hours as needed for Pain. Max Daily Amount: 6 Tabs. 30 Tab 0    Omeprazole delayed release (PRILOSEC D/R) 20 mg tablet Take 1 Tab by mouth daily.  OTC 30 Tab 1     Allergies   Allergen Reactions    Percocet [Oxycodone-Acetaminophen] Nausea and Vomiting          Review of Systems:     General - No history or complaints of unexpected fever, chills, or weight loss  Head/Neck - No history or complaints of headache, diplopia, dysphagia, hearing loss  Cardiac - No history or complaints of chest pain, palpitations, murmur, or shortness of breath  Pulmonary - No history or complaints of shortness of breath, productive cough, hemoptysis  Gastrointestinal - minimal reflux,no  abdominal pain, obstipation/constipation or blood per rectum  Genitourinary - No history or complaints of hematuria/dysuria, stress urinary incontinence symptoms, or renal lithiasis  Musculoskeletal - minimal joint pain ,  no muscular weakness  Hematologic - No history or complaints of bleeding disorders,  No blood transfusions  Neurologic - No history or complaints of  migraine headaches, seizure activity, syncopal episodes, TIA or stroke  Integumentary - No history or complaints of rashes, abnormal nevi, skin cancer  Gynecological - sporadic menses               Objective:     Visit Vitals    /82 (BP 1 Location: Left arm, BP Patient Position: Sitting)    Pulse 71    Resp 16    Ht 5' 10\" (1.778 m)    Wt 125.2 kg (276 lb)    LMP 08/01/2017    SpO2 100%    BMI 39.6 kg/m2       Physical Examination: General appearance - alert, well appearing, and in no distress and oriented to person, place, and time  Mental status - alert, oriented to person, place, and time, normal mood, behavior, speech, dress, motor activity, and thought processes  Eyes - pupils equal and reactive, extraocular eye movements intact, sclera anicteric, left eye normal, right eye normal  Ears - right ear normal, left ear normal  Nose - normal and patent, no erythema, discharge or polyps  Mouth - mucous membranes moist, pharynx normal without lesions  Neck - supple, no significant adenopathy  Lymphatics - no palpable lymphadenopathy, no hepatosplenomegaly  Chest - clear to auscultation, no wheezes, rales or rhonchi, symmetric air entry  Heart - normal rate, regular rhythm, normal S1, S2, no murmurs, rubs, clicks or gallops  Abdomen - soft, nontender, nondistended, no masses or organomegaly  Back exam - full range of motion, no tenderness, palpable spasm or pain on motion  Neurological - alert, oriented, normal speech, no focal findings or movement disorder noted  Musculoskeletal - no joint tenderness, deformity or swelling  Extremities - peripheral pulses normal, no pedal edema, no clubbing or cyanosis    Labs :     Lab Results   Component Value Date/Time    WBC 13.8 12/04/2017 12:20 PM    HGB 12.0 12/04/2017 12:20 PM    HCT 36.4 12/04/2017 12:20 PM    PLATELET 187 01/00/5923 12:20 PM    MCV 82.9 12/04/2017 12:20 PM     Lab Results   Component Value Date/Time    Sodium 140 12/04/2017 12:20 PM    Potassium 4.1 12/04/2017 12:20 PM    Chloride 103 12/04/2017 12:20 PM    CO2 29 12/04/2017 12:20 PM    Anion gap 8 12/04/2017 12:20 PM    Glucose 110 12/04/2017 12:20 PM    BUN 6 12/04/2017 12:20 PM    Creatinine 0.71 12/04/2017 12:20 PM    BUN/Creatinine ratio 8 12/04/2017 12:20 PM    GFR est AA >60 12/04/2017 12:20 PM    GFR est non-AA >60 12/04/2017 12:20 PM    Calcium 9.4 12/04/2017 12:20 PM    Bilirubin, total 0.3 12/04/2017 12:20 PM    AST (SGOT) 10 12/04/2017 12:20 PM    Alk. phosphatase 83 12/04/2017 12:20 PM    Protein, total 7.4 12/04/2017 12:20 PM    Albumin 3.8 12/04/2017 12:20 PM    Globulin 3.6 12/04/2017 12:20 PM    A-G Ratio 1.1 12/04/2017 12:20 PM    ALT (SGPT) 25 12/04/2017 12:20 PM     No results found for: IRON, FE, TIBC, IBCT, PSAT, FERR  No results found for: FOL, RBCF  Lab Results   Component Value Date/Time    Vitamin D 25-Hydroxy 31.1 12/03/2014 02:05 PM               Cardiac / Pulmonary Evaluation:            UGI Results:     Procedures  Date of Service: 10/11/17 [address]   Physician Assistant   Cosigned by:  Almedia Gosselin, MD at 10/12/17 6082      []Hide copied text  []Wmver for attribution information  Amelia Verma : 1985  Medical Record Number:[id number]                 PREPROCEDURE DIAGNOSIS: This patient is preoperative for laparoscopic sleeve gastrectomyprocedure with a history of  reflux disease.     POSTPROCEDURE DIAGNOSIS: This patient is preoperative for laparoscopic sleeve gastrectomyprocedure with a history of  reflux disease.        PROCEDURES PERFORMED: Upper GI study with barium.     ESTIMATED BLOOD LOSS: None.     SPECIMENS: None.     STATEMENT OF MEDICAL NECESSITY: The patient is a patient with a  longstanding history of obesity. They are now considering the laparoscopic sleeve gastrectomyprocedure as a means of surgical weight control and due to their history of reflux disease and are being assessed preoperatively for such.     DESCRIPTION OF PROCEDURE: The patient was brought to the fluoroscopy unit and  was given thin barium. On swallowing of barium, they were noted to have  normal peristalsis of their esophagus. They had prompt filling of distal  esophagus with tapering into the gastroesophageal junction. There was no evidence of a hiatal hernia present. Contrast then filled the gastric cardia, fundus,body and pre pyloric region with no abnormalities noted. Contrast then exited the pylorus in normal fashion. No obstruction was noted. There was no evidence of reflux noted.     (normal anatomy)     Paul Dugan MD      Electronically signed by KALEY Moreno at 10/11/17 1447   Electronically signed by Itzel Monsivais MD at 10/12/17 1352           Assessment:     Morbid obesity with comorbidity    Plan:     laparoscopic sleeve gastrectomy    This is a 27 y.o. female with a BMI of Body mass index is 39.6 kg/(m^2). and the weight-related co-morbidties as noted above. [facility] meets the NIH criteria for bariatric surgery based upon the BMI of Body mass index is 39.6 kg/(m^2). and multiple weight-related co-morbidties.  [facility] has elected laparoscopic sleeve gastrectomy as her intervention of choice for treatment of morbid obestiy through surgical means secondary to its safety profile, rapid return to work  and decreases in operative risks over gastric bypass. In the office today, following Dulce's history and physical examination, a 40 minute discussion regarding the anatomic alterations for the laparoscopic sleeve gastrectomy was undertaken. The dietary expectations and the patient  dependent factors for success were thoroughly discussed, to include the need for interval follow-up and long-term dietary changes associated with success. The possible complications of the sleeve gastrectomy  were also discussed, to include;death, DVT/PE, staple line leak, bleeding, stricture formation, infection, nutritional deficiencies and sleeve dilation. Specific weight related outcomes for success were also discussed with an emphasis on careful and close follow-up with the first year and eating behavior modification as the baseline and cyclical hunger return. The patient expressed an understanding of the above factors, and her questions were answered in their entirety. In addition, the patient attended a 1.5 hour power point seminar regarding obesity, surgical weight loss including, adjustable gastric band, gastric bypass, and sleeve gastrectomy. This discussion contrasted the different surgical techniques, mechanisms of actions and expected outcomes, and surgical and medical risks associated with each procedure. During this seminar, there was a long question and answer session where each questions was answered until there were no additional questions. Today, the patient had all of her questions answered and the decision was made today that the patient's preoperative evaluation is acceptable for them  to proceed with bariatric surgery  choosing the sleeve gastrectomy as her surgical option.         Secondary Diagnoses:     DVT / Pulmonary Embolus Risk - The patient is at a higher risk for post operative DVT / pulmonary embolus secondary to their morbid obese status, relative sedentary lifestyle, and impending general anesthetic. We will plan to use anticoagulation therapy pre and post operative as well as TEDs and  pneumatic compression devices and encourage ambulation once on the hospital nursing floor. The need for possible at home anticoagulation therapy has also been discussed and any decision on this matter will be made during post operative evaluations. The patient understands that their efforts at ambulation are of vital importance to reduce the risk of this complication thus placing significant burden on them as to the prevention of such issues. Signs and symptoms of DVT / PE have been discussed with the patient and they have been instructed to call the office if any these occur in the \"at home\" post op phase. Hypertension - The patient has a clear understanding of how weight loss improves hypertension as a whole, but also they understand that there is a significant genetic component to this disease process. We will monitor the patients blood pressure while in the hospital and the plan would be to continue those medications postoperatively.  If a diuretic is being used we will stop them on discharge to prevent dehydration particularly with the sleeve gastrectomy and the gastric bypass procedures.  They will be instructed to monitor their blood pressure postoperatively while at home and notify their primary care physician in the event of any significantly high or uncharacteristic readings.     GERD -The patient understands that weight loss surgery is not a guaranteed cure for reflux disease but does understand the benefits that weight loss can have on reflux disease.  They also understand that at the time of surgery the gastroesophageal junction will be evaluated for the presence of a diaphragmatic hernia.  Hernias will be corrected always with the gastric band and sleeve gastrectomy procedures, but only on a case by case basis with the gastric bypass if it prevents our ability to perform the operation at hand, or if I feel that they would benefit long term with correction of this issue.  The patient also understands that neither weight loss surgery nor repair of a diaphragmatic hernia repair guarantees the complete cessation of the disease. They also understand there is a possibility of recurrence with a simple crural repair as is performed with these procedures. They understand they may have to continue their medications in the postoperative period. They have a good understanding that the gastric bypass procedure is better suited to total resolution of this issue and that neither the Lap Band nor sleeve gastrectomy is considered a curative procedure as it pertains to this diagnosis. Adult Onset Diabetes - The patient has lisa given a very low carbohydrate diet preoperatively along with instructions to monitor their blood sugars on a regular daily basis. When  their surgery is performed  we will be monitoring the patient with sliding scale insulin and accuchecks.  Based on those values we will determine whether the patient needs a reduction of those medications postoperatively or total removal of those medications on discharge.  We will have the patient continue accuchecks postoperatively while at home also and report to me or their family physician for appropriate adjustments as needed.  The patient also understands that in the event of uncontrolled blood sugar preoperatively that we may choose to postpone their surgery.     Polycystic Ovarian Syndrome -The patient does understand the association between obesity and the development of PCOS.  They understand that weight loss can often improve symptoms and in many cases cure the disease.  If the disease is associated with infertility this too is often corrected with adequate weight loss.  The patient understands that any change to the pharmaceutical treatment of her PCOS will be managed by the primary care physician or OB/GYN        Signed By: Mitch Jones MD     December 4, 2017

## 2017-12-04 NOTE — PROGRESS NOTES
Sleeve Gastrectomy - History and Physical    Subjective: The patient is a 27 y.o. obese female with a Body mass index is 39.6 kg/(m^2). .   she presents now to review their work up to date to see if they are a candidate for surgery and whether or not to proceed with the previously requested procedure. Bariatric comorbidities continue to include:   Patient Active Problem List   Diagnosis Code    PCOS (polycystic ovarian syndrome) E28.2    Diabetes ([address] E11.9    Endometrial polyp N84.0    Morbid obesity ([facility] [address] E66.01    Morbid obesity with body mass index of 40.0-49.9 ([facility]) E66.01    Hypertension I10    Irregular menses N92.6    Arthritic-like pain M25.50    Chronic back pain M54.9, G89.29       They have been generally well prior to this visit and have had no recent significant illnesses. The patient has had no gastrointestinal issues that would preclude them from proceeding with the surgery they have chosen. Carissa Aquino has recently tried a preoperative weight loss program  in addition to seeing a bariatric nutritionist preoperatively. We have discussed on at least one other occasion about the various types of surgical weight loss procedures and they have considered these options after our initial consultation. We have once again discussed these procedures in detail and they have now decided on a surgical procedure. They present today to discuss this and confirm that their evaluation pre operatively is acceptable to continue with surgery. The patient desires laparoscopic sleeve gastrectomy for surgical weight loss. The patients goal weight is 188lb. (this represents a BMI of 27)    These goals are consistent with expected outcomes of their desired operation. her Medical goals are resolution of these health issues.     Patient Active Problem List    Diagnosis Date Noted    Diabetes ([address]     Endometrial polyp     Morbid obesity ([facility] [address]     Morbid obesity with body mass index of 40.0-49.9 ([facility] [address]     Hypertension     Irregular menses     Arthritic-like pain     Chronic back pain     PCOS (polycystic ovarian syndrome) 12/03/2014     Past Surgical History:   Procedure Laterality Date    HX GYN  03/31/2017    D&C      Social History   Substance Use Topics    Smoking status: Never Smoker    Smokeless tobacco: Never Used    Alcohol use 0.6 oz/week     1 Shots of liquor per week      Family History   Problem Relation Age of Onset    Diabetes Maternal Aunt     Diabetes Paternal Grandmother     Diabetes Paternal Grandfather     Diabetes Mother       Current Outpatient Prescriptions   Medication Sig Dispense Refill    azithromycin (ZITHROMAX Z-MANDY) 250 mg tablet Take 1 Tab by mouth See Admin Instructions. Take 2 pills by mouth on the first day then 1 pill by mouth daily until done 6 Tab 0    ergocalciferol (VITAMIN D2) 50,000 unit capsule Take 50,000 Units by mouth every seven (7) days. Indications: Vitamin D Deficiency      glipiZIDE (GLUCOTROL) 10 mg tablet Take 10 mg by mouth daily. Indications: type 2 diabetes mellitus      multivitamin (ONE A DAY) tablet Take 1 Tab by mouth daily.  metFORMIN (GLUCOPHAGE) 1,000 mg tablet Take 1 Tab by mouth two (2) times daily (with meals). Indications: type 2 diabetes mellitus 30 Tab 0    citalopram (CELEXA) 20 mg tablet Take 20 mg by mouth daily. Indications: ANXIETY WITH DEPRESSION      lisinopril-hydrochlorothiazide (PRINZIDE, ZESTORETIC) 10-12.5 mg per tablet Take 1 tablet by mouth daily. (Patient taking differently: Take 1 Tab by mouth daily. Indications: kidney perfusion) 30 tablet 5    oxyCODONE-acetaminophen (PERCOCET) 5-325 mg per tablet Take 1 Tab by mouth every four (4) hours as needed for Pain. Max Daily Amount: 6 Tabs. 30 Tab 0    Omeprazole delayed release (PRILOSEC D/R) 20 mg tablet Take 1 Tab by mouth daily.  OTC 30 Tab 1     Allergies   Allergen Reactions    Percocet [Oxycodone-Acetaminophen] Nausea and Vomiting Review of Systems:     General - No history or complaints of unexpected fever, chills, or weight loss  Head/Neck - No history or complaints of headache, diplopia, dysphagia, hearing loss  Cardiac - No history or complaints of chest pain, palpitations, murmur, or shortness of breath  Pulmonary - No history or complaints of shortness of breath, productive cough, hemoptysis  Gastrointestinal - no reflux, no abdominal pain, obstipation/constipation or blood per rectum  Genitourinary - No history or complaints of hematuria/dysuria, stress urinary incontinence symptoms, or renal lithiasis  Musculoskeletal - moderate joint pain in their lower back,  no muscular weakness  Hematologic - No history or complaints of bleeding disorders,  No blood transfusions  Neurologic - No history or complaints of  migraine headaches, seizure activity, syncopal episodes, TIA or stroke  Integumentary - No history or complaints of rashes, abnormal nevi, skin cancer  Gynecological - irregular menses    Objective:     Visit Vitals    /82 (BP 1 Location: Left arm, BP Patient Position: Sitting)    Pulse 71    Resp 16    Ht 5' 10\" (1.778 m)    Wt 125.2 kg (276 lb)    LMP 08/01/2017    SpO2 100%    BMI 39.6 kg/m2       Physical Examination: General appearance - alert, well appearing, and in no distress and oriented to person, place, and time  Mental status - alert, oriented to person, place, and time, normal mood, behavior, speech, dress, motor activity, and thought processes  Eyes - pupils equal and reactive, extraocular eye movements intact, sclera anicteric, left eye normal, right eye normal  Ears - bilateral TM's and external ear canals normal, right ear normal, left ear normal  Nose - normal and patent, no erythema, discharge or polyps and normal nontender sinuses  Mouth - mucous membranes moist, pharynx normal without lesions  Neck - supple, no significant adenopathy  Lymphatics - no palpable lymphadenopathy, no hepatosplenomegaly  Chest - clear to auscultation, no wheezes, rales or rhonchi, symmetric air entry  Heart - normal rate, regular rhythm, normal S1, S2, no murmurs, rubs, clicks or gallops  Abdomen - soft, nontender, nondistended, no masses or organomegaly  Back exam - full range of motion, no tenderness, palpable spasm or pain on motion  Neurological - alert, oriented, normal speech, no focal findings or movement disorder noted  Musculoskeletal - no joint tenderness, deformity or swelling  Extremities - peripheral pulses normal, no pedal edema, no clubbing or cyanosis  Skin - normal coloration and turgor, no rashes, no suspicious skin lesions no    Labs / Preoperative Evaluation:        Recent Results (from the past 1008 hour(s))   METABOLIC PANEL, BASIC    Collection Time: 12/02/17  3:00 AM   Result Value Ref Range    Sodium 133 (L) 136 - 145 mmol/L    Potassium 4.0 3.5 - 5.5 mmol/L    Chloride 98 (L) 100 - 108 mmol/L    CO2 29 21 - 32 mmol/L    Anion gap 6 3.0 - 18 mmol/L    Glucose 223 (H) 74 - 99 mg/dL    BUN 9 7.0 - 18 MG/DL    Creatinine 0.76 0.6 - 1.3 MG/DL    BUN/Creatinine ratio 12 12 - 20      GFR est AA >60 >60 ml/min/1.73m2    GFR est non-AA >60 >60 ml/min/1.73m2    Calcium 9.2 8.5 - 10.1 MG/DL   CBC WITH AUTOMATED DIFF    Collection Time: 12/02/17  3:00 AM   Result Value Ref Range    WBC 16.4 (H) 4.6 - 13.2 K/uL    RBC 4.87 4.20 - 5.30 M/uL    HGB 13.6 12.0 - 16.0 g/dL    HCT 40.3 35.0 - 45.0 %    MCV 82.8 74.0 - 97.0 FL    MCH 27.9 24.0 - 34.0 PG    MCHC 33.7 31.0 - 37.0 g/dL    RDW 13.9 11.6 - 14.5 %    PLATELET 042 022 - 906 K/uL    MPV 9.9 9.2 - 11.8 FL    NEUTROPHILS 59 40 - 73 %    LYMPHOCYTES 31 21 - 52 %    MONOCYTES 6 3 - 10 %    EOSINOPHILS 4 0 - 5 %    BASOPHILS 0 0 - 2 %    ABS. NEUTROPHILS 9.5 (H) 1.8 - 8.0 K/UL    ABS. LYMPHOCYTES 5.1 (H) 0.9 - 3.6 K/UL    ABS. MONOCYTES 1.0 0.05 - 1.2 K/UL    ABS. EOSINOPHILS 0.7 (H) 0.0 - 0.4 K/UL    ABS.  BASOPHILS 0.1 0.0 - 0.1 K/UL    DF AUTOMATED EKG, 12 LEAD, INITIAL    Collection Time: 12/04/17 12:12 PM   Result Value Ref Range    Ventricular Rate 67 BPM    Atrial Rate 67 BPM    P-R Interval 154 ms    QRS Duration 78 ms    Q-T Interval 420 ms    QTC Calculation (Bezet) 443 ms    Calculated P Axis 54 degrees    Calculated R Axis 93 degrees    Calculated T Axis 67 degrees    Diagnosis       Normal sinus rhythm  Rightward axis  Borderline ECG  When compared with ECG of 05-NOV-2015 09:02,  Questionable change in QRS axis     CBC WITH AUTOMATED DIFF    Collection Time: 12/04/17 12:20 PM   Result Value Ref Range    WBC 13.8 (H) 4.6 - 13.2 K/uL    RBC 4.39 4.20 - 5.30 M/uL    HGB 12.0 12.0 - 16.0 g/dL    HCT 36.4 35.0 - 45.0 %    MCV 82.9 74.0 - 97.0 FL    MCH 27.3 24.0 - 34.0 PG    MCHC 33.0 31.0 - 37.0 g/dL    RDW 14.1 11.6 - 14.5 %    PLATELET 126 436 - 943 K/uL    MPV 8.9 (L) 9.2 - 11.8 FL    NEUTROPHILS 55 40 - 73 %    LYMPHOCYTES 35 21 - 52 %    MONOCYTES 5 3 - 10 %    EOSINOPHILS 5 0 - 5 %    BASOPHILS 0 0 - 2 %    ABS. NEUTROPHILS 7.5 1.8 - 8.0 K/UL    ABS. LYMPHOCYTES 4.8 (H) 0.9 - 3.6 K/UL    ABS. MONOCYTES 0.7 0.05 - 1.2 K/UL    ABS. EOSINOPHILS 0.7 (H) 0.0 - 0.4 K/UL    ABS. BASOPHILS 0.1 (H) 0.0 - 0.06 K/UL    DF AUTOMATED     METABOLIC PANEL, COMPREHENSIVE    Collection Time: 12/04/17 12:20 PM   Result Value Ref Range    Sodium 140 136 - 145 mmol/L    Potassium 4.1 3.5 - 5.5 mmol/L    Chloride 103 100 - 108 mmol/L    CO2 29 21 - 32 mmol/L    Anion gap 8 3.0 - 18 mmol/L    Glucose 110 (H) 74 - 99 mg/dL    BUN 6 (L) 7.0 - 18 MG/DL    Creatinine 0.71 0.6 - 1.3 MG/DL    BUN/Creatinine ratio 8 (L) 12 - 20      GFR est AA >60 >60 ml/min/1.73m2    GFR est non-AA >60 >60 ml/min/1.73m2    Calcium 9.4 8.5 - 10.1 MG/DL    Bilirubin, total 0.3 0.2 - 1.0 MG/DL    ALT (SGPT) 25 13 - 56 U/L    AST (SGOT) 10 (L) 15 - 37 U/L    Alk.  phosphatase 83 45 - 117 U/L    Protein, total 7.4 6.4 - 8.2 g/dL    Albumin 3.8 3.4 - 5.0 g/dL    Globulin 3.6 2.0 - 4.0 g/dL    A-G Ratio 1.1 0.8 - 1.7     HEMOGLOBIN A1C WITH EAG    Collection Time: 12/04/17 12:21 PM   Result Value Ref Range    Hemoglobin A1c 8.0 (H) 4.5 - 5.6 %    Est. average glucose 183 mg/dL       Assessment:     Morbid obesity with comorbidity    Plan:     laparoscopic sleeve gastrectomy    This is a 27 y.o. female with a BMI of Body mass index is 39.6 kg/(m^2). and the weight-related co-morbidties as noted above. [facility] meets the NIH criteria for bariatric surgery based upon the BMI of Body mass index is 39.6 kg/(m^2). and multiple weight-related co-morbidties. [facility] has elected laparoscopic sleeve gastrectomy as her intervention of choice for treatment of morbid obestiy through surgical means secondary to its safety profile, rapid return to work  and decreases in operative risks over gastric bypass. In the office today, following Dulce's history and physical examination, a 40 minute discussion regarding the anatomic alterations for the laparoscopic sleeve gastrectomy was undertaken. The dietary expectations and the patient  dependent factors for success were thoroughly discussed, to include the need for interval follow-up and long-term dietary changes associated with success. The possible complications of the sleeve gastrectomy  were also discussed, to include;death, DVT/PE, staple line leak, bleeding, stricture formation, infection, nutritional deficiencies and sleeve dilation. Specific weight related outcomes for success were also discussed with an emphasis on careful and close follow-up with the first year and eating behavior modification as the baseline and cyclical hunger return. The patient expressed an understanding of the above factors, and her questions were answered in their entirety. In addition, the patient attended a 1.5 hour power point seminar regarding obesity, surgical weight loss including, adjustable gastric band, gastric bypass, and sleeve gastrectomy.   This discussion contrasted the different surgical techniques, mechanisms of actions and expected outcomes, and surgical and medical risks associated with each procedure. During this seminar, there was a long question and answer session where each questions was answered until there were no additional questions. Today, the patient had all of her questions answered and the decision was made today that the patient's preoperative evaluation is acceptable for them  to proceed with bariatric surgery  choosing the sleeve gastrectomy as her surgical option. The patient understands the plan of action    Since the patient's original consult 8.5 months ago they have been seen by their PCP and GYN  for routine visits as well as an ER visit 48 hrs ago for what was diagnosed as bronchitis. There has been no change to their overall medical or surgical history and they have been on no steroids in any form. Secondary Diagnoses:     DVT / Pulmonary Embolus Risk - The patient is at a higher risk for post operative DVT / pulmonary embolus secondary to their morbid obese status, relative sedentary lifestyle, and impending general anesthetic. We will plan to use anticoagulation therapy pre and post operative as well as  pneumatic compression devices and encourage ambulation once on the hospital nursing floor. The need for possible at home anticoagulation therapy has also been discussed and any decision on this matter will be made during post operative evaluations. The patient understands that their efforts at ambulation are of vital importance to reduce the risk of this complication thus placing significant burden on them as to the prevention of such issues. Signs and symptoms of DVT / PE have been discussed with the patient and they have been instructed to call the office if any these occur in the \"at home\" post op phase.     Polycystic Ovarian Syndrome -The patient does understand the association between obesity and the development of PCOS.  They understand that weight loss can often improve symptoms and in many cases cure the disease.  If the disease is associated with infertility this too is often corrected with adequate weight loss.  The patient understands that any change to the pharmaceutical treatment of her PCOS will be managed by the primary care physician or OB/GYN     Hypertension - The patient has a clear understanding of how weight loss improves hypertension as a whole, but also they understand that there is a significant genetic component to this disease process. We will monitor the patients blood pressure while in the hospital and the plan would be to continue those medications postoperatively.  If a diuretic is being used we will stop them on discharge to prevent dehydration particularly with the sleeve gastrectomy and the gastric bypass procedures.  They will be instructed to monitor their blood pressure postoperatively while at home and notify their primary care physician in the event of any significantly high or uncharacteristic readings.     Adult Onset Diabetes - The patient has lisa given a very low carbohydrate diet preoperatively along with instructions to monitor their blood sugars on a regular daily basis.  When  their surgery is performed  we will be monitoring the patient with sliding scale insulin and accuchecks.  Based on those values we will determine whether the patient needs a reduction of those medications postoperatively or total removal of those medications on discharge.  We will have the patient continue accuchecks postoperatively while at home also and report to me or their family physician for appropriate adjustments as needed.  The patient also understands that in the event of uncontrolled blood sugar preoperatively that we may choose to postpone their surgery.     Weight Related Arthritis -The patient understands the benefits that weight loss surgery can have on their arthritis but also understands that weight loss is not a guaranteed cure and relief of symptoms is often dependent on the severity of the underlying disease.  The patient also understands that traditional pharmaceutical treatments for this diagnosis are usually unavailable to post-operative weight loss patients due to the effects on the gastrointestinal tract particularly with the gastric bypass and to a lesser effect with the sleeve gastrectomy.  Any changes to the patients medication treatment will ultimately be made the patients PCP with input by our office.     Signed By: KALEY Melendrez     December 4, 2017

## 2017-12-04 NOTE — PATIENT INSTRUCTIONS
Body Mass Index: Care Instructions  Your Care Instructions    Body mass index (BMI) can help you see if your weight is raising your risk for health problems. It uses a formula to compare how much you weigh with how tall you are. · A BMI lower than 18.5 is considered underweight. · A BMI between 18.5 and 24.9 is considered healthy. · A BMI between 25 and 29.9 is considered overweight. A BMI of 30 or higher is considered obese. If your BMI is in the normal range, it means that you have a lower risk for weight-related health problems. If your BMI is in the overweight or obese range, you may be at increased risk for weight-related health problems, such as high blood pressure, heart disease, stroke, arthritis or joint pain, and diabetes. If your BMI is in the underweight range, you may be at increased risk for health problems such as fatigue, lower protection (immunity) against illness, muscle loss, bone loss, hair loss, and hormone problems. BMI is just one measure of your risk for weight-related health problems. You may be at higher risk for health problems if you are not active, you eat an unhealthy diet, or you drink too much alcohol or use tobacco products. Follow-up care is a key part of your treatment and safety. Be sure to make and go to all appointments, and call your doctor if you are having problems. It's also a good idea to know your test results and keep a list of the medicines you take. How can you care for yourself at home? · Practice healthy eating habits. This includes eating plenty of fruits, vegetables, whole grains, lean protein, and low-fat dairy. · If your doctor recommends it, get more exercise. Walking is a good choice. Bit by bit, increase the amount you walk every day. Try for at least 30 minutes on most days of the week. · Do not smoke. Smoking can increase your risk for health problems. If you need help quitting, talk to your doctor about stop-smoking programs and medicines. These can increase your chances of quitting for good. · Limit alcohol to 2 drinks a day for men and 1 drink a day for women. Too much alcohol can cause health problems. If you have a BMI higher than 25  · Your doctor may do other tests to check your risk for weight-related health problems. This may include measuring the distance around your waist. A waist measurement of more than 40 inches in men or 35 inches in women can increase the risk of weight-related health problems. · Talk with your doctor about steps you can take to stay healthy or improve your health. You may need to make lifestyle changes to lose weight and stay healthy, such as changing your diet and getting regular exercise. If you have a BMI lower than 18.5  · Your doctor may do other tests to check your risk for health problems. · Talk with your doctor about steps you can take to stay healthy or improve your health. You may need to make lifestyle changes to gain or maintain weight and stay healthy, such as getting more healthy foods in your diet and doing exercises to build muscle. Where can you learn more? Go to http://león-irving.info/. Enter S176 in the search box to learn more about \"Body Mass Index: Care Instructions. \"  Current as of: October 13, 2016  Content Version: 11.4  © 4461-6329 [employer]. Care instructions adapted under license by [employer] (which disclaims liability or warranty for this information). If you have questions about a medical condition or this instruction, always ask your healthcare professional. [address] any warranty or liability for your use of this information.

## 2017-12-04 NOTE — PROGRESS NOTES
Appears to have a good understanding of the diet progression, food choices, and dietary/exercise habits for successful weight loss and nourishment after surgery. The class material included: post-op diet progression, including liquid, pureed, and low fat, low sugar food recommendations; proper food group choices, and encouraging dietary and exercise habits that lead to weight loss success.      Jeffery Flower RD

## 2017-12-05 LAB
ATRIAL RATE: 67 BPM
CALCULATED P AXIS, ECG09: 54 DEGREES
CALCULATED R AXIS, ECG10: 93 DEGREES
CALCULATED T AXIS, ECG11: 67 DEGREES
DIAGNOSIS, 93000: NORMAL
P-R INTERVAL, ECG05: 154 MS
Q-T INTERVAL, ECG07: 420 MS
QRS DURATION, ECG06: 78 MS
QTC CALCULATION (BEZET), ECG08: 443 MS
VENTRICULAR RATE, ECG03: 67 BPM

## 2017-12-14 ENCOUNTER — ANESTHESIA EVENT (OUTPATIENT)
Dept: SURGERY | Age: 32
DRG: 621 | End: 2017-12-14
Payer: COMMERCIAL

## 2017-12-14 ENCOUNTER — ANESTHESIA (OUTPATIENT)
Dept: SURGERY | Age: 32
DRG: 621 | End: 2017-12-14
Payer: COMMERCIAL

## 2017-12-14 ENCOUNTER — HOSPITAL ENCOUNTER (INPATIENT)
Age: 32
LOS: 1 days | Discharge: HOME | DRG: 621 | End: 2017-12-15
Attending: SPECIALIST | Admitting: SPECIALIST
Payer: COMMERCIAL

## 2017-12-14 PROBLEM — E66.01 SEVERE OBESITY WITH BODY MASS INDEX (BMI) OF 35.0 TO 39.9 WITH COMORBIDITY (HCC): Status: ACTIVE | Noted: 2017-12-14

## 2017-12-14 LAB
GLUCOSE BLD STRIP.AUTO-MCNC: 134 MG/DL (ref 70–110)
GLUCOSE BLD STRIP.AUTO-MCNC: 135 MG/DL (ref 70–110)
GLUCOSE BLD STRIP.AUTO-MCNC: 188 MG/DL (ref 70–110)
HCG SERPL QL: NEGATIVE

## 2017-12-14 PROCEDURE — 77030034029 HC SLV GASTRCTMY CAL SYS DISP BOEH -C: Performed by: SPECIALIST

## 2017-12-14 PROCEDURE — 76010000149 HC OR TIME 1 TO 1.5 HR: Performed by: SPECIALIST

## 2017-12-14 PROCEDURE — 74011250636 HC RX REV CODE- 250/636: Performed by: SPECIALIST

## 2017-12-14 PROCEDURE — 88307 TISSUE EXAM BY PATHOLOGIST: CPT | Performed by: SPECIALIST

## 2017-12-14 PROCEDURE — 77030008683 HC TU ET CUF COVD -A: Performed by: NURSE ANESTHETIST, CERTIFIED REGISTERED

## 2017-12-14 PROCEDURE — 77030036598 HC CARTDRG STPL RELD ECHELON FLX J&J -D: Performed by: SPECIALIST

## 2017-12-14 PROCEDURE — 77030002966 HC SUT PDS J&J -A: Performed by: SPECIALIST

## 2017-12-14 PROCEDURE — 77030008603 HC TRCR ENDOSC EPATH J&J -C: Performed by: SPECIALIST

## 2017-12-14 PROCEDURE — 0DB68ZX EXCISION OF STOMACH, VIA NATURAL OR ARTIFICIAL OPENING ENDOSCOPIC, DIAGNOSTIC: ICD-10-PCS | Performed by: SPECIALIST

## 2017-12-14 PROCEDURE — 77010033678 HC OXYGEN DAILY

## 2017-12-14 PROCEDURE — 77030006643: Performed by: NURSE ANESTHETIST, CERTIFIED REGISTERED

## 2017-12-14 PROCEDURE — 88313 SPECIAL STAINS GROUP 2: CPT | Performed by: SPECIALIST

## 2017-12-14 PROCEDURE — 77030033200 HC PRT CLSR CRTR THOMP COOP -C: Performed by: SPECIALIST

## 2017-12-14 PROCEDURE — 88305 TISSUE EXAM BY PATHOLOGIST: CPT | Performed by: SPECIALIST

## 2017-12-14 PROCEDURE — 74011000250 HC RX REV CODE- 250

## 2017-12-14 PROCEDURE — 74011250636 HC RX REV CODE- 250/636

## 2017-12-14 PROCEDURE — 77030009426 HC FCPS BIOP ENDOSC BSC -B: Performed by: SPECIALIST

## 2017-12-14 PROCEDURE — 77030002916 HC SUT ETHLN J&J -A: Performed by: SPECIALIST

## 2017-12-14 PROCEDURE — 77030034154 HC SHR COAG HARM ACE J&J -F: Performed by: SPECIALIST

## 2017-12-14 PROCEDURE — 88342 IMHCHEM/IMCYTCHM 1ST ANTB: CPT | Performed by: SPECIALIST

## 2017-12-14 PROCEDURE — 76060000033 HC ANESTHESIA 1 TO 1.5 HR: Performed by: SPECIALIST

## 2017-12-14 PROCEDURE — 77030003580 HC NDL INSUF VERES J&J -B: Performed by: SPECIALIST

## 2017-12-14 PROCEDURE — 77030020255 HC SOL INJ LR 1000ML BG: Performed by: SPECIALIST

## 2017-12-14 PROCEDURE — 77030012893: Performed by: SPECIALIST

## 2017-12-14 PROCEDURE — 77030010515 HC APPL ENDOCLP LIG J&J -B: Performed by: SPECIALIST

## 2017-12-14 PROCEDURE — 74011250637 HC RX REV CODE- 250/637: Performed by: SPECIALIST

## 2017-12-14 PROCEDURE — 77030012407 HC DRN WND BARD -B: Performed by: SPECIALIST

## 2017-12-14 PROCEDURE — 0DB64Z3 EXCISION OF STOMACH, PERCUTANEOUS ENDOSCOPIC APPROACH, VERTICAL: ICD-10-PCS | Performed by: SPECIALIST

## 2017-12-14 PROCEDURE — 74011000250 HC RX REV CODE- 250: Performed by: SPECIALIST

## 2017-12-14 PROCEDURE — 65270000029 HC RM PRIVATE

## 2017-12-14 PROCEDURE — 77030020782 HC GWN BAIR PAWS FLX 3M -B: Performed by: SPECIALIST

## 2017-12-14 PROCEDURE — 74011636637 HC RX REV CODE- 636/637: Performed by: SPECIALIST

## 2017-12-14 PROCEDURE — 77030022585 HC SEAL FBRN EVICEL J&J -F: Performed by: SPECIALIST

## 2017-12-14 PROCEDURE — 77030008477 HC STYL SATN SLP COVD -A: Performed by: NURSE ANESTHETIST, CERTIFIED REGISTERED

## 2017-12-14 PROCEDURE — 77030027876 HC STPLR ENDOSC FLX PWR J&J -G1: Performed by: SPECIALIST

## 2017-12-14 PROCEDURE — 77030013567 HC DRN WND RESERV BARD -A: Performed by: SPECIALIST

## 2017-12-14 PROCEDURE — 76210000016 HC OR PH I REC 1 TO 1.5 HR: Performed by: SPECIALIST

## 2017-12-14 PROCEDURE — 36415 COLL VENOUS BLD VENIPUNCTURE: CPT | Performed by: SPECIALIST

## 2017-12-14 PROCEDURE — 84703 CHORIONIC GONADOTROPIN ASSAY: CPT | Performed by: SPECIALIST

## 2017-12-14 PROCEDURE — 77030032490 HC SLV COMPR SCD KNE COVD -B: Performed by: SPECIALIST

## 2017-12-14 PROCEDURE — 77030033269 HC SLV COMPR SCD KNE2 CARD -B

## 2017-12-14 PROCEDURE — 77030002933 HC SUT MCRYL J&J -A: Performed by: SPECIALIST

## 2017-12-14 PROCEDURE — 0FB24ZX EXCISION OF LEFT LOBE LIVER, PERCUTANEOUS ENDOSCOPIC APPROACH, DIAGNOSTIC: ICD-10-PCS | Performed by: SPECIALIST

## 2017-12-14 PROCEDURE — 77030018836 HC SOL IRR NACL ICUM -A: Performed by: SPECIALIST

## 2017-12-14 PROCEDURE — 82962 GLUCOSE BLOOD TEST: CPT

## 2017-12-14 PROCEDURE — 77030027138 HC INCENT SPIROMETER -A

## 2017-12-14 PROCEDURE — 77030002912 HC SUT ETHBND J&J -A: Performed by: SPECIALIST

## 2017-12-14 RX ORDER — HYDROMORPHONE HYDROCHLORIDE 1 MG/ML
0.5 INJECTION, SOLUTION INTRAMUSCULAR; INTRAVENOUS; SUBCUTANEOUS
Status: DISCONTINUED | OUTPATIENT
Start: 2017-12-14 | End: 2017-12-15

## 2017-12-14 RX ORDER — METOCLOPRAMIDE HYDROCHLORIDE 5 MG/ML
INJECTION INTRAMUSCULAR; INTRAVENOUS AS NEEDED
Status: DISCONTINUED | OUTPATIENT
Start: 2017-12-14 | End: 2017-12-14 | Stop reason: HOSPADM

## 2017-12-14 RX ORDER — ENOXAPARIN SODIUM 100 MG/ML
40 INJECTION SUBCUTANEOUS EVERY 12 HOURS
Status: DISCONTINUED | OUTPATIENT
Start: 2017-12-14 | End: 2017-12-15 | Stop reason: HOSPADM

## 2017-12-14 RX ORDER — HYDROMORPHONE HYDROCHLORIDE 2 MG/ML
0.5 INJECTION, SOLUTION INTRAMUSCULAR; INTRAVENOUS; SUBCUTANEOUS
Status: DISCONTINUED | OUTPATIENT
Start: 2017-12-14 | End: 2017-12-14 | Stop reason: HOSPADM

## 2017-12-14 RX ORDER — NEOSTIGMINE METHYLSULFATE 5 MG/5 ML
SYRINGE (ML) INTRAVENOUS AS NEEDED
Status: DISCONTINUED | OUTPATIENT
Start: 2017-12-14 | End: 2017-12-14 | Stop reason: HOSPADM

## 2017-12-14 RX ORDER — KETOROLAC TROMETHAMINE 30 MG/ML
INJECTION, SOLUTION INTRAMUSCULAR; INTRAVENOUS AS NEEDED
Status: DISCONTINUED | OUTPATIENT
Start: 2017-12-14 | End: 2017-12-14 | Stop reason: HOSPADM

## 2017-12-14 RX ORDER — DEXTROSE 50 % IN WATER (D50W) INTRAVENOUS SYRINGE
25-50 AS NEEDED
Status: DISCONTINUED | OUTPATIENT
Start: 2017-12-14 | End: 2017-12-14 | Stop reason: HOSPADM

## 2017-12-14 RX ORDER — DEXTROSE 50 % IN WATER (D50W) INTRAVENOUS SYRINGE
25-50 AS NEEDED
Status: DISCONTINUED | OUTPATIENT
Start: 2017-12-14 | End: 2017-12-15 | Stop reason: HOSPADM

## 2017-12-14 RX ORDER — ONDANSETRON 2 MG/ML
4 INJECTION INTRAMUSCULAR; INTRAVENOUS
Status: DISCONTINUED | OUTPATIENT
Start: 2017-12-14 | End: 2017-12-15 | Stop reason: HOSPADM

## 2017-12-14 RX ORDER — NALOXONE HYDROCHLORIDE 0.4 MG/ML
0.1 INJECTION, SOLUTION INTRAMUSCULAR; INTRAVENOUS; SUBCUTANEOUS
Status: DISCONTINUED | OUTPATIENT
Start: 2017-12-14 | End: 2017-12-14 | Stop reason: HOSPADM

## 2017-12-14 RX ORDER — PROPOFOL 10 MG/ML
INJECTION, EMULSION INTRAVENOUS AS NEEDED
Status: DISCONTINUED | OUTPATIENT
Start: 2017-12-14 | End: 2017-12-14 | Stop reason: HOSPADM

## 2017-12-14 RX ORDER — ONDANSETRON 2 MG/ML
4 INJECTION INTRAMUSCULAR; INTRAVENOUS ONCE
Status: DISCONTINUED | OUTPATIENT
Start: 2017-12-14 | End: 2017-12-14 | Stop reason: HOSPADM

## 2017-12-14 RX ORDER — OXYCODONE AND ACETAMINOPHEN 5; 325 MG/1; MG/1
1 TABLET ORAL AS NEEDED
Status: DISCONTINUED | OUTPATIENT
Start: 2017-12-14 | End: 2017-12-14 | Stop reason: HOSPADM

## 2017-12-14 RX ORDER — HYDROMORPHONE HYDROCHLORIDE 1 MG/ML
1 INJECTION, SOLUTION INTRAMUSCULAR; INTRAVENOUS; SUBCUTANEOUS
Status: DISCONTINUED | OUTPATIENT
Start: 2017-12-14 | End: 2017-12-15

## 2017-12-14 RX ORDER — FAMOTIDINE 10 MG/ML
20 INJECTION INTRAVENOUS
Status: COMPLETED | OUTPATIENT
Start: 2017-12-14 | End: 2017-12-14

## 2017-12-14 RX ORDER — SODIUM CHLORIDE, SODIUM LACTATE, POTASSIUM CHLORIDE, CALCIUM CHLORIDE 600; 310; 30; 20 MG/100ML; MG/100ML; MG/100ML; MG/100ML
50 INJECTION, SOLUTION INTRAVENOUS CONTINUOUS
Status: DISCONTINUED | OUTPATIENT
Start: 2017-12-14 | End: 2017-12-14 | Stop reason: HOSPADM

## 2017-12-14 RX ORDER — MAGNESIUM SULFATE 100 %
4 CRYSTALS MISCELLANEOUS AS NEEDED
Status: DISCONTINUED | OUTPATIENT
Start: 2017-12-14 | End: 2017-12-14 | Stop reason: HOSPADM

## 2017-12-14 RX ORDER — DIPHENHYDRAMINE HYDROCHLORIDE 50 MG/ML
25 INJECTION, SOLUTION INTRAMUSCULAR; INTRAVENOUS
Status: DISCONTINUED | OUTPATIENT
Start: 2017-12-14 | End: 2017-12-15 | Stop reason: HOSPADM

## 2017-12-14 RX ORDER — SODIUM CHLORIDE, SODIUM LACTATE, POTASSIUM CHLORIDE, CALCIUM CHLORIDE 600; 310; 30; 20 MG/100ML; MG/100ML; MG/100ML; MG/100ML
125 INJECTION, SOLUTION INTRAVENOUS CONTINUOUS
Status: DISCONTINUED | OUTPATIENT
Start: 2017-12-14 | End: 2017-12-14

## 2017-12-14 RX ORDER — BUPIVACAINE HYDROCHLORIDE AND EPINEPHRINE 2.5; 5 MG/ML; UG/ML
INJECTION, SOLUTION EPIDURAL; INFILTRATION; INTRACAUDAL; PERINEURAL AS NEEDED
Status: DISCONTINUED | OUTPATIENT
Start: 2017-12-14 | End: 2017-12-14 | Stop reason: HOSPADM

## 2017-12-14 RX ORDER — SODIUM CHLORIDE, SODIUM LACTATE, POTASSIUM CHLORIDE, CALCIUM CHLORIDE 600; 310; 30; 20 MG/100ML; MG/100ML; MG/100ML; MG/100ML
150 INJECTION, SOLUTION INTRAVENOUS CONTINUOUS
Status: DISCONTINUED | OUTPATIENT
Start: 2017-12-14 | End: 2017-12-15 | Stop reason: HOSPADM

## 2017-12-14 RX ORDER — KETOROLAC TROMETHAMINE 30 MG/ML
30 INJECTION, SOLUTION INTRAMUSCULAR; INTRAVENOUS EVERY 6 HOURS
Status: DISCONTINUED | OUTPATIENT
Start: 2017-12-14 | End: 2017-12-15 | Stop reason: HOSPADM

## 2017-12-14 RX ORDER — CEFAZOLIN SODIUM 2 G/50ML
2 SOLUTION INTRAVENOUS EVERY 8 HOURS
Status: DISCONTINUED | OUTPATIENT
Start: 2017-12-14 | End: 2017-12-14 | Stop reason: DRUGHIGH

## 2017-12-14 RX ORDER — ENOXAPARIN SODIUM 100 MG/ML
40 INJECTION SUBCUTANEOUS
Status: COMPLETED | OUTPATIENT
Start: 2017-12-14 | End: 2017-12-14

## 2017-12-14 RX ORDER — LIDOCAINE HYDROCHLORIDE 20 MG/ML
INJECTION, SOLUTION EPIDURAL; INFILTRATION; INTRACAUDAL; PERINEURAL AS NEEDED
Status: DISCONTINUED | OUTPATIENT
Start: 2017-12-14 | End: 2017-12-14 | Stop reason: HOSPADM

## 2017-12-14 RX ORDER — NYSTATIN 100000 [USP'U]/ML
500000 SUSPENSION ORAL
Status: COMPLETED | OUTPATIENT
Start: 2017-12-14 | End: 2017-12-14

## 2017-12-14 RX ORDER — GLYCOPYRROLATE 0.2 MG/ML
INJECTION INTRAMUSCULAR; INTRAVENOUS AS NEEDED
Status: DISCONTINUED | OUTPATIENT
Start: 2017-12-14 | End: 2017-12-14 | Stop reason: HOSPADM

## 2017-12-14 RX ORDER — LABETALOL HYDROCHLORIDE 5 MG/ML
INJECTION, SOLUTION INTRAVENOUS AS NEEDED
Status: DISCONTINUED | OUTPATIENT
Start: 2017-12-14 | End: 2017-12-14 | Stop reason: HOSPADM

## 2017-12-14 RX ORDER — INSULIN LISPRO 100 [IU]/ML
INJECTION, SOLUTION INTRAVENOUS; SUBCUTANEOUS EVERY 6 HOURS
Status: DISCONTINUED | OUTPATIENT
Start: 2017-12-14 | End: 2017-12-15 | Stop reason: HOSPADM

## 2017-12-14 RX ORDER — INSULIN LISPRO 100 [IU]/ML
INJECTION, SOLUTION INTRAVENOUS; SUBCUTANEOUS ONCE
Status: DISCONTINUED | OUTPATIENT
Start: 2017-12-14 | End: 2017-12-14 | Stop reason: HOSPADM

## 2017-12-14 RX ORDER — SODIUM CHLORIDE 0.9 % (FLUSH) 0.9 %
5-10 SYRINGE (ML) INJECTION AS NEEDED
Status: DISCONTINUED | OUTPATIENT
Start: 2017-12-14 | End: 2017-12-14 | Stop reason: HOSPADM

## 2017-12-14 RX ORDER — FENTANYL CITRATE 50 UG/ML
INJECTION, SOLUTION INTRAMUSCULAR; INTRAVENOUS AS NEEDED
Status: DISCONTINUED | OUTPATIENT
Start: 2017-12-14 | End: 2017-12-14 | Stop reason: HOSPADM

## 2017-12-14 RX ORDER — HYDROMORPHONE HYDROCHLORIDE 2 MG/ML
INJECTION, SOLUTION INTRAMUSCULAR; INTRAVENOUS; SUBCUTANEOUS AS NEEDED
Status: DISCONTINUED | OUTPATIENT
Start: 2017-12-14 | End: 2017-12-14 | Stop reason: HOSPADM

## 2017-12-14 RX ORDER — ACETAMINOPHEN 10 MG/ML
1000 INJECTION, SOLUTION INTRAVENOUS EVERY 6 HOURS
Status: COMPLETED | OUTPATIENT
Start: 2017-12-14 | End: 2017-12-15

## 2017-12-14 RX ORDER — ROCURONIUM BROMIDE 10 MG/ML
INJECTION, SOLUTION INTRAVENOUS AS NEEDED
Status: DISCONTINUED | OUTPATIENT
Start: 2017-12-14 | End: 2017-12-14 | Stop reason: HOSPADM

## 2017-12-14 RX ORDER — ONDANSETRON 2 MG/ML
INJECTION INTRAMUSCULAR; INTRAVENOUS AS NEEDED
Status: DISCONTINUED | OUTPATIENT
Start: 2017-12-14 | End: 2017-12-14 | Stop reason: HOSPADM

## 2017-12-14 RX ORDER — ACETAMINOPHEN 10 MG/ML
1000 INJECTION, SOLUTION INTRAVENOUS ONCE
Status: COMPLETED | OUTPATIENT
Start: 2017-12-14 | End: 2017-12-14

## 2017-12-14 RX ORDER — MIDAZOLAM HYDROCHLORIDE 1 MG/ML
INJECTION, SOLUTION INTRAMUSCULAR; INTRAVENOUS AS NEEDED
Status: DISCONTINUED | OUTPATIENT
Start: 2017-12-14 | End: 2017-12-14 | Stop reason: HOSPADM

## 2017-12-14 RX ORDER — FENTANYL CITRATE 50 UG/ML
25 INJECTION, SOLUTION INTRAMUSCULAR; INTRAVENOUS
Status: DISCONTINUED | OUTPATIENT
Start: 2017-12-14 | End: 2017-12-14 | Stop reason: HOSPADM

## 2017-12-14 RX ADMIN — KETOROLAC TROMETHAMINE 30 MG: 30 INJECTION, SOLUTION INTRAMUSCULAR at 23:43

## 2017-12-14 RX ADMIN — HYDROMORPHONE HYDROCHLORIDE 1 MG: 1 INJECTION, SOLUTION INTRAMUSCULAR; INTRAVENOUS; SUBCUTANEOUS at 22:01

## 2017-12-14 RX ADMIN — GLYCOPYRROLATE 0.4 MG: 0.2 INJECTION INTRAMUSCULAR; INTRAVENOUS at 11:28

## 2017-12-14 RX ADMIN — ROCURONIUM BROMIDE 50 MG: 10 INJECTION, SOLUTION INTRAVENOUS at 10:22

## 2017-12-14 RX ADMIN — SODIUM CHLORIDE, SODIUM LACTATE, POTASSIUM CHLORIDE, AND CALCIUM CHLORIDE 150 ML/HR: 600; 310; 30; 20 INJECTION, SOLUTION INTRAVENOUS at 13:58

## 2017-12-14 RX ADMIN — FENTANYL CITRATE 25 MCG: 50 INJECTION, SOLUTION INTRAMUSCULAR; INTRAVENOUS at 12:47

## 2017-12-14 RX ADMIN — HYDROMORPHONE HYDROCHLORIDE 1 MG: 2 INJECTION, SOLUTION INTRAMUSCULAR; INTRAVENOUS; SUBCUTANEOUS at 10:47

## 2017-12-14 RX ADMIN — ACETAMINOPHEN 1000 MG: 10 INJECTION, SOLUTION INTRAVENOUS at 21:39

## 2017-12-14 RX ADMIN — LIDOCAINE HYDROCHLORIDE 100 MG: 20 INJECTION, SOLUTION EPIDURAL; INFILTRATION; INTRACAUDAL; PERINEURAL at 10:22

## 2017-12-14 RX ADMIN — Medication 3 MG: at 11:28

## 2017-12-14 RX ADMIN — METOCLOPRAMIDE HYDROCHLORIDE 10 MG: 5 INJECTION INTRAMUSCULAR; INTRAVENOUS at 11:16

## 2017-12-14 RX ADMIN — SODIUM CHLORIDE, SODIUM LACTATE, POTASSIUM CHLORIDE, AND CALCIUM CHLORIDE: 600; 310; 30; 20 INJECTION, SOLUTION INTRAVENOUS at 10:16

## 2017-12-14 RX ADMIN — Medication 3 G: at 18:14

## 2017-12-14 RX ADMIN — LABETALOL HYDROCHLORIDE 5 MG: 5 INJECTION, SOLUTION INTRAVENOUS at 11:06

## 2017-12-14 RX ADMIN — INSULIN LISPRO 3 UNITS: 100 INJECTION, SOLUTION INTRAVENOUS; SUBCUTANEOUS at 18:48

## 2017-12-14 RX ADMIN — ONDANSETRON 4 MG: 2 INJECTION INTRAMUSCULAR; INTRAVENOUS at 16:22

## 2017-12-14 RX ADMIN — HYDROMORPHONE HYDROCHLORIDE 1 MG: 1 INJECTION, SOLUTION INTRAMUSCULAR; INTRAVENOUS; SUBCUTANEOUS at 14:27

## 2017-12-14 RX ADMIN — SODIUM CHLORIDE, SODIUM LACTATE, POTASSIUM CHLORIDE, AND CALCIUM CHLORIDE 125 ML/HR: 600; 310; 30; 20 INJECTION, SOLUTION INTRAVENOUS at 12:05

## 2017-12-14 RX ADMIN — SODIUM CHLORIDE, SODIUM LACTATE, POTASSIUM CHLORIDE, AND CALCIUM CHLORIDE 150 ML/HR: 600; 310; 30; 20 INJECTION, SOLUTION INTRAVENOUS at 16:31

## 2017-12-14 RX ADMIN — KETOROLAC TROMETHAMINE 30 MG: 30 INJECTION, SOLUTION INTRAMUSCULAR; INTRAVENOUS at 11:16

## 2017-12-14 RX ADMIN — KETOROLAC TROMETHAMINE 30 MG: 30 INJECTION, SOLUTION INTRAMUSCULAR at 16:22

## 2017-12-14 RX ADMIN — FAMOTIDINE 20 MG: 10 INJECTION, SOLUTION INTRAVENOUS at 09:10

## 2017-12-14 RX ADMIN — ONDANSETRON 4 MG: 2 INJECTION INTRAMUSCULAR; INTRAVENOUS at 11:16

## 2017-12-14 RX ADMIN — ENOXAPARIN SODIUM 40 MG: 40 INJECTION SUBCUTANEOUS at 09:09

## 2017-12-14 RX ADMIN — ACETAMINOPHEN 1000 MG: 10 INJECTION, SOLUTION INTRAVENOUS at 10:16

## 2017-12-14 RX ADMIN — FENTANYL CITRATE 25 MCG: 50 INJECTION, SOLUTION INTRAMUSCULAR; INTRAVENOUS at 12:37

## 2017-12-14 RX ADMIN — FENTANYL CITRATE 150 MCG: 50 INJECTION, SOLUTION INTRAMUSCULAR; INTRAVENOUS at 10:20

## 2017-12-14 RX ADMIN — MIDAZOLAM HYDROCHLORIDE 2 MG: 1 INJECTION, SOLUTION INTRAMUSCULAR; INTRAVENOUS at 10:16

## 2017-12-14 RX ADMIN — PROPOFOL 200 MG: 10 INJECTION, EMULSION INTRAVENOUS at 10:22

## 2017-12-14 RX ADMIN — SODIUM CHLORIDE, SODIUM LACTATE, POTASSIUM CHLORIDE, AND CALCIUM CHLORIDE 150 ML/HR: 600; 310; 30; 20 INJECTION, SOLUTION INTRAVENOUS at 23:44

## 2017-12-14 RX ADMIN — Medication 3 G: at 10:16

## 2017-12-14 RX ADMIN — SODIUM CHLORIDE, SODIUM LACTATE, POTASSIUM CHLORIDE, AND CALCIUM CHLORIDE: 600; 310; 30; 20 INJECTION, SOLUTION INTRAVENOUS at 10:56

## 2017-12-14 RX ADMIN — ONDANSETRON 4 MG: 2 INJECTION INTRAMUSCULAR; INTRAVENOUS at 22:01

## 2017-12-14 RX ADMIN — NYSTATIN 500000 UNITS: 100000 SUSPENSION ORAL at 09:09

## 2017-12-14 RX ADMIN — FENTANYL CITRATE 50 MCG: 50 INJECTION, SOLUTION INTRAMUSCULAR; INTRAVENOUS at 10:42

## 2017-12-14 RX ADMIN — ENOXAPARIN SODIUM 40 MG: 40 INJECTION SUBCUTANEOUS at 21:39

## 2017-12-14 RX ADMIN — ACETAMINOPHEN 1000 MG: 10 INJECTION, SOLUTION INTRAVENOUS at 16:22

## 2017-12-14 RX ADMIN — FENTANYL CITRATE 50 MCG: 50 INJECTION, SOLUTION INTRAMUSCULAR; INTRAVENOUS at 10:54

## 2017-12-14 RX ADMIN — HYDROMORPHONE HYDROCHLORIDE 1 MG: 1 INJECTION, SOLUTION INTRAMUSCULAR; INTRAVENOUS; SUBCUTANEOUS at 18:27

## 2017-12-14 NOTE — PERIOP NOTES
Family has been updated and given inpatient room #326. Receiving unit notified that SBAR is available for review.

## 2017-12-14 NOTE — PERIOP NOTES
Unable to contact family, no one in either waiting room and no answer on phone provided for [facility].

## 2017-12-14 NOTE — H&P (VIEW-ONLY)
Sleeve Gastrectomy - History and Physical    Subjective: The patient is a 27 y.o. obese female with a Body mass index is 39.6 kg/(m^2). .   she presents now to review their work up to date to see if they are a candidate for surgery and whether or not to proceed with the previously requested procedure. Bariatric comorbidities continue to include:   Patient Active Problem List   Diagnosis Code    PCOS (polycystic ovarian syndrome) E28.2    Diabetes ([address] E11.9    Endometrial polyp N84.0    Morbid obesity ([address] E66.01    Morbid obesity with body mass index of 40.0-49.9 ([facility]) E66.01    Hypertension I10    Irregular menses N92.6    Arthritic-like pain M25.50    Chronic back pain M54.9, G89.29       They have been generally well prior to this visit and have had no recent significant illnesses. The patient has had no gastrointestinal issues that would preclude them from proceeding with the surgery they have chosen. Lea Tyson has recently tried a preoperative weight loss program  in addition to seeing a bariatric nutritionist preoperatively. We have discussed on at least one other occasion about the various types of surgical weight loss procedures and they have considered these options after our initial consultation. We have once again discussed these procedures in detail and they have now decided on a surgical procedure. They present today to discuss this and confirm that their evaluation pre operatively is acceptable to continue with surgery. The patient desires laparoscopic sleeve gastrectomy for surgical weight loss. The patients goal weight is 188lb. (this represents a BMI of 27)    These goals are consistent with expected outcomes of their desired operation. her Medical goals are resolution of these health issues.     Patient Active Problem List    Diagnosis Date Noted    Diabetes ([address]     Endometrial polyp     Morbid obesity ([address]     Morbid obesity with body mass index of 40.0-49.9 [facility]     Hypertension     Irregular menses     Arthritic-like pain     Chronic back pain     PCOS (polycystic ovarian syndrome) 12/03/2014     Past Surgical History:   Procedure Laterality Date    HX GYN  03/31/2017    D&C      Social History   Substance Use Topics    Smoking status: Never Smoker    Smokeless tobacco: Never Used    Alcohol use 0.6 oz/week     1 Shots of liquor per week      Family History   Problem Relation Age of Onset    Diabetes Maternal Aunt     Diabetes Paternal Grandmother     Diabetes Paternal Grandfather     Diabetes Mother       Current Outpatient Prescriptions   Medication Sig Dispense Refill    azithromycin (ZITHROMAX Z-MANDY) 250 mg tablet Take 1 Tab by mouth See Admin Instructions. Take 2 pills by mouth on the first day then 1 pill by mouth daily until done 6 Tab 0    ergocalciferol (VITAMIN D2) 50,000 unit capsule Take 50,000 Units by mouth every seven (7) days. Indications: Vitamin D Deficiency      glipiZIDE (GLUCOTROL) 10 mg tablet Take 10 mg by mouth daily. Indications: type 2 diabetes mellitus      multivitamin (ONE A DAY) tablet Take 1 Tab by mouth daily.  metFORMIN (GLUCOPHAGE) 1,000 mg tablet Take 1 Tab by mouth two (2) times daily (with meals). Indications: type 2 diabetes mellitus 30 Tab 0    citalopram (CELEXA) 20 mg tablet Take 20 mg by mouth daily. Indications: ANXIETY WITH DEPRESSION      lisinopril-hydrochlorothiazide (PRINZIDE, ZESTORETIC) 10-12.5 mg per tablet Take 1 tablet by mouth daily. (Patient taking differently: Take 1 Tab by mouth daily. Indications: kidney perfusion) 30 tablet 5    oxyCODONE-acetaminophen (PERCOCET) 5-325 mg per tablet Take 1 Tab by mouth every four (4) hours as needed for Pain. Max Daily Amount: 6 Tabs. 30 Tab 0    Omeprazole delayed release (PRILOSEC D/R) 20 mg tablet Take 1 Tab by mouth daily.  OTC 30 Tab 1     Allergies   Allergen Reactions    Percocet [Oxycodone-Acetaminophen] Nausea and Vomiting Review of Systems:     General - No history or complaints of unexpected fever, chills, or weight loss  Head/Neck - No history or complaints of headache, diplopia, dysphagia, hearing loss  Cardiac - No history or complaints of chest pain, palpitations, murmur, or shortness of breath  Pulmonary - No history or complaints of shortness of breath, productive cough, hemoptysis  Gastrointestinal - no reflux, no abdominal pain, obstipation/constipation or blood per rectum  Genitourinary - No history or complaints of hematuria/dysuria, stress urinary incontinence symptoms, or renal lithiasis  Musculoskeletal - moderate joint pain in their lower back,  no muscular weakness  Hematologic - No history or complaints of bleeding disorders,  No blood transfusions  Neurologic - No history or complaints of  migraine headaches, seizure activity, syncopal episodes, TIA or stroke  Integumentary - No history or complaints of rashes, abnormal nevi, skin cancer  Gynecological - irregular menses    Objective:     Visit Vitals    /82 (BP 1 Location: Left arm, BP Patient Position: Sitting)    Pulse 71    Resp 16    Ht 5' 10\" (1.778 m)    Wt 125.2 kg (276 lb)    LMP 08/01/2017    SpO2 100%    BMI 39.6 kg/m2       Physical Examination: General appearance - alert, well appearing, and in no distress and oriented to person, place, and time  Mental status - alert, oriented to person, place, and time, normal mood, behavior, speech, dress, motor activity, and thought processes  Eyes - pupils equal and reactive, extraocular eye movements intact, sclera anicteric, left eye normal, right eye normal  Ears - bilateral TM's and external ear canals normal, right ear normal, left ear normal  Nose - normal and patent, no erythema, discharge or polyps and normal nontender sinuses  Mouth - mucous membranes moist, pharynx normal without lesions  Neck - supple, no significant adenopathy  Lymphatics - no palpable lymphadenopathy, no hepatosplenomegaly  Chest - clear to auscultation, no wheezes, rales or rhonchi, symmetric air entry  Heart - normal rate, regular rhythm, normal S1, S2, no murmurs, rubs, clicks or gallops  Abdomen - soft, nontender, nondistended, no masses or organomegaly  Back exam - full range of motion, no tenderness, palpable spasm or pain on motion  Neurological - alert, oriented, normal speech, no focal findings or movement disorder noted  Musculoskeletal - no joint tenderness, deformity or swelling  Extremities - peripheral pulses normal, no pedal edema, no clubbing or cyanosis  Skin - normal coloration and turgor, no rashes, no suspicious skin lesions no    Labs / Preoperative Evaluation:        Recent Results (from the past 1008 hour(s))   METABOLIC PANEL, BASIC    Collection Time: 12/02/17  3:00 AM   Result Value Ref Range    Sodium 133 (L) 136 - 145 mmol/L    Potassium 4.0 3.5 - 5.5 mmol/L    Chloride 98 (L) 100 - 108 mmol/L    CO2 29 21 - 32 mmol/L    Anion gap 6 3.0 - 18 mmol/L    Glucose 223 (H) 74 - 99 mg/dL    BUN 9 7.0 - 18 MG/DL    Creatinine 0.76 0.6 - 1.3 MG/DL    BUN/Creatinine ratio 12 12 - 20      GFR est AA >60 >60 ml/min/1.73m2    GFR est non-AA >60 >60 ml/min/1.73m2    Calcium 9.2 8.5 - 10.1 MG/DL   CBC WITH AUTOMATED DIFF    Collection Time: 12/02/17  3:00 AM   Result Value Ref Range    WBC 16.4 (H) 4.6 - 13.2 K/uL    RBC 4.87 4.20 - 5.30 M/uL    HGB 13.6 12.0 - 16.0 g/dL    HCT 40.3 35.0 - 45.0 %    MCV 82.8 74.0 - 97.0 FL    MCH 27.9 24.0 - 34.0 PG    MCHC 33.7 31.0 - 37.0 g/dL    RDW 13.9 11.6 - 14.5 %    PLATELET 262 561 - 885 K/uL    MPV 9.9 9.2 - 11.8 FL    NEUTROPHILS 59 40 - 73 %    LYMPHOCYTES 31 21 - 52 %    MONOCYTES 6 3 - 10 %    EOSINOPHILS 4 0 - 5 %    BASOPHILS 0 0 - 2 %    ABS. NEUTROPHILS 9.5 (H) 1.8 - 8.0 K/UL    ABS. LYMPHOCYTES 5.1 (H) 0.9 - 3.6 K/UL    ABS. MONOCYTES 1.0 0.05 - 1.2 K/UL    ABS. EOSINOPHILS 0.7 (H) 0.0 - 0.4 K/UL    ABS.  BASOPHILS 0.1 0.0 - 0.1 K/UL    DF AUTOMATED EKG, 12 LEAD, INITIAL    Collection Time: 12/04/17 12:12 PM   Result Value Ref Range    Ventricular Rate 67 BPM    Atrial Rate 67 BPM    P-R Interval 154 ms    QRS Duration 78 ms    Q-T Interval 420 ms    QTC Calculation (Bezet) 443 ms    Calculated P Axis 54 degrees    Calculated R Axis 93 degrees    Calculated T Axis 67 degrees    Diagnosis       Normal sinus rhythm  Rightward axis  Borderline ECG  When compared with ECG of 05-NOV-2015 09:02,  Questionable change in QRS axis     CBC WITH AUTOMATED DIFF    Collection Time: 12/04/17 12:20 PM   Result Value Ref Range    WBC 13.8 (H) 4.6 - 13.2 K/uL    RBC 4.39 4.20 - 5.30 M/uL    HGB 12.0 12.0 - 16.0 g/dL    HCT 36.4 35.0 - 45.0 %    MCV 82.9 74.0 - 97.0 FL    MCH 27.3 24.0 - 34.0 PG    MCHC 33.0 31.0 - 37.0 g/dL    RDW 14.1 11.6 - 14.5 %    PLATELET 965 596 - 951 K/uL    MPV 8.9 (L) 9.2 - 11.8 FL    NEUTROPHILS 55 40 - 73 %    LYMPHOCYTES 35 21 - 52 %    MONOCYTES 5 3 - 10 %    EOSINOPHILS 5 0 - 5 %    BASOPHILS 0 0 - 2 %    ABS. NEUTROPHILS 7.5 1.8 - 8.0 K/UL    ABS. LYMPHOCYTES 4.8 (H) 0.9 - 3.6 K/UL    ABS. MONOCYTES 0.7 0.05 - 1.2 K/UL    ABS. EOSINOPHILS 0.7 (H) 0.0 - 0.4 K/UL    ABS. BASOPHILS 0.1 (H) 0.0 - 0.06 K/UL    DF AUTOMATED     METABOLIC PANEL, COMPREHENSIVE    Collection Time: 12/04/17 12:20 PM   Result Value Ref Range    Sodium 140 136 - 145 mmol/L    Potassium 4.1 3.5 - 5.5 mmol/L    Chloride 103 100 - 108 mmol/L    CO2 29 21 - 32 mmol/L    Anion gap 8 3.0 - 18 mmol/L    Glucose 110 (H) 74 - 99 mg/dL    BUN 6 (L) 7.0 - 18 MG/DL    Creatinine 0.71 0.6 - 1.3 MG/DL    BUN/Creatinine ratio 8 (L) 12 - 20      GFR est AA >60 >60 ml/min/1.73m2    GFR est non-AA >60 >60 ml/min/1.73m2    Calcium 9.4 8.5 - 10.1 MG/DL    Bilirubin, total 0.3 0.2 - 1.0 MG/DL    ALT (SGPT) 25 13 - 56 U/L    AST (SGOT) 10 (L) 15 - 37 U/L    Alk.  phosphatase 83 45 - 117 U/L    Protein, total 7.4 6.4 - 8.2 g/dL    Albumin 3.8 3.4 - 5.0 g/dL    Globulin 3.6 2.0 - 4.0 g/dL    A-G Ratio 1.1 0.8 - 1.7     HEMOGLOBIN A1C WITH EAG    Collection Time: 12/04/17 12:21 PM   Result Value Ref Range    Hemoglobin A1c 8.0 (H) 4.5 - 5.6 %    Est. average glucose 183 mg/dL       Assessment:     Morbid obesity with comorbidity    Plan:     laparoscopic sleeve gastrectomy    This is a 27 y.o. female with a BMI of Body mass index is 39.6 kg/(m^2). and the weight-related co-morbidties as noted above. [facility] meets the NIH criteria for bariatric surgery based upon the BMI of Body mass index is 39.6 kg/(m^2). and multiple weight-related co-morbidties. [facility] has elected laparoscopic sleeve gastrectomy as her intervention of choice for treatment of morbid obestiy through surgical means secondary to its safety profile, rapid return to work  and decreases in operative risks over gastric bypass. In the office today, following Dulce's history and physical examination, a 40 minute discussion regarding the anatomic alterations for the laparoscopic sleeve gastrectomy was undertaken. The dietary expectations and the patient  dependent factors for success were thoroughly discussed, to include the need for interval follow-up and long-term dietary changes associated with success. The possible complications of the sleeve gastrectomy  were also discussed, to include;death, DVT/PE, staple line leak, bleeding, stricture formation, infection, nutritional deficiencies and sleeve dilation. Specific weight related outcomes for success were also discussed with an emphasis on careful and close follow-up with the first year and eating behavior modification as the baseline and cyclical hunger return. The patient expressed an understanding of the above factors, and her questions were answered in their entirety. In addition, the patient attended a 1.5 hour power point seminar regarding obesity, surgical weight loss including, adjustable gastric band, gastric bypass, and sleeve gastrectomy.   This discussion contrasted the different surgical techniques, mechanisms of actions and expected outcomes, and surgical and medical risks associated with each procedure. During this seminar, there was a long question and answer session where each questions was answered until there were no additional questions. Today, the patient had all of her questions answered and the decision was made today that the patient's preoperative evaluation is acceptable for them  to proceed with bariatric surgery  choosing the sleeve gastrectomy as her surgical option. The patient understands the plan of action    Since the patient's original consult 8.5 months ago they have been seen by their PCP and GYN  for routine visits as well as an ER visit 48 hrs ago for what was diagnosed as bronchitis. There has been no change to their overall medical or surgical history and they have been on no steroids in any form. Secondary Diagnoses:     DVT / Pulmonary Embolus Risk - The patient is at a higher risk for post operative DVT / pulmonary embolus secondary to their morbid obese status, relative sedentary lifestyle, and impending general anesthetic. We will plan to use anticoagulation therapy pre and post operative as well as  pneumatic compression devices and encourage ambulation once on the hospital nursing floor. The need for possible at home anticoagulation therapy has also been discussed and any decision on this matter will be made during post operative evaluations. The patient understands that their efforts at ambulation are of vital importance to reduce the risk of this complication thus placing significant burden on them as to the prevention of such issues. Signs and symptoms of DVT / PE have been discussed with the patient and they have been instructed to call the office if any these occur in the \"at home\" post op phase.     Polycystic Ovarian Syndrome -The patient does understand the association between obesity and the development of PCOS.  They understand that weight loss can often improve symptoms and in many cases cure the disease.  If the disease is associated with infertility this too is often corrected with adequate weight loss.  The patient understands that any change to the pharmaceutical treatment of her PCOS will be managed by the primary care physician or OB/GYN     Hypertension - The patient has a clear understanding of how weight loss improves hypertension as a whole, but also they understand that there is a significant genetic component to this disease process. We will monitor the patients blood pressure while in the hospital and the plan would be to continue those medications postoperatively.  If a diuretic is being used we will stop them on discharge to prevent dehydration particularly with the sleeve gastrectomy and the gastric bypass procedures.  They will be instructed to monitor their blood pressure postoperatively while at home and notify their primary care physician in the event of any significantly high or uncharacteristic readings.     Adult Onset Diabetes - The patient has lisa given a very low carbohydrate diet preoperatively along with instructions to monitor their blood sugars on a regular daily basis.  When  their surgery is performed  we will be monitoring the patient with sliding scale insulin and accuchecks.  Based on those values we will determine whether the patient needs a reduction of those medications postoperatively or total removal of those medications on discharge.  We will have the patient continue accuchecks postoperatively while at home also and report to me or their family physician for appropriate adjustments as needed.  The patient also understands that in the event of uncontrolled blood sugar preoperatively that we may choose to postpone their surgery.     Weight Related Arthritis -The patient understands the benefits that weight loss surgery can have on their arthritis but also understands that weight loss is not a guaranteed cure and relief of symptoms is often dependent on the severity of the underlying disease.  The patient also understands that traditional pharmaceutical treatments for this diagnosis are usually unavailable to post-operative weight loss patients due to the effects on the gastrointestinal tract particularly with the gastric bypass and to a lesser effect with the sleeve gastrectomy.  Any changes to the patients medication treatment will ultimately be made the patients PCP with input by our office.     Signed By: KALEY Loew     December 4, 2017

## 2017-12-14 NOTE — ANESTHESIA POSTPROCEDURE EVALUATION
Post-Anesthesia Evaluation and Assessment    Cardiovascular Function/Vital Signs  Visit Vitals    /83    Pulse 61    Temp 36.9 °C (98.5 °F)    Resp 17    Ht 5' 10\" (1.778 m)    Wt 123.4 kg (272 lb 1 oz)    SpO2 100%    BMI 39.04 kg/m2       Patient is status post Procedure(s):  LAPAROSCOPIC GASTRIC SLEEVE, WEDGE  LIVER BIOPSY AND INTRAOPERATIVE ENDOSCOPY WITH BIOPSY. Nausea/Vomiting: Controlled. Postoperative hydration reviewed and adequate. Pain:  Pain Scale 1: Visual (12/14/17 1245)  Pain Intensity 1: 1 (intermittently wakes up,screams in pain,falls back to sleep) (12/14/17 1245)   Managed. Neurological Status:   Neuro (WDL): Exceptions to WDL (12/14/17 1245)   At baseline. Mental Status and Level of Consciousness: Baseline and appropriate for discharge. Pulmonary Status:   O2 Device: Nasal cannula (12/14/17 1159)   Adequate oxygenation and airway patent. Complications related to anesthesia: None    Post-anesthesia assessment completed. No concerns. Patient has met all discharge requirements.     Signed By: Dagoberto Mccann MD    December 14, 2017

## 2017-12-14 NOTE — ROUTINE PROCESS
TRANSFER - IN REPORT:    Verbal report received from [address], RN (name) on Adrienne Paulino  being received from PACU (unit) for routine post - op      Report consisted of patients Situation, Background, Assessment and   Recommendations(SBAR). Information from the following report(s) SBAR, Kardex, OR Summary, Intake/Output, MAR and Recent Results was reviewed with the receiving nurse. Opportunity for questions and clarification was provided. Assessment completed upon patients arrival to unit and care assumed at 1315 hrs.

## 2017-12-14 NOTE — ANESTHESIA PREPROCEDURE EVALUATION
Anesthetic History   No history of anesthetic complications            Review of Systems / Medical History  Patient summary reviewed, nursing notes reviewed and pertinent labs reviewed    Pulmonary  Within defined limits                 Neuro/Psych   Within defined limits           Cardiovascular    Hypertension: well controlled              Exercise tolerance: >4 METS     GI/Hepatic/Renal  Within defined limits              Endo/Other    Diabetes: well controlled, type 2    Morbid obesity     Other Findings            Physical Exam    Airway  Mallampati: II  TM Distance: 4 - 6 cm  Neck ROM: normal range of motion   Mouth opening: Normal     Cardiovascular               Dental  No notable dental hx       Pulmonary                 Abdominal         Other Findings            Anesthetic Plan    ASA: 3  Anesthesia type: general          Induction: Intravenous  Anesthetic plan and risks discussed with: Patient and Spouse

## 2017-12-14 NOTE — IP AVS SNAPSHOT
[address] [facility] 
 
 
 [address] [facility] [address] 
[phone] Patient: Torres Ashley MRN: [id number] WNU:9[birth year]16/[id number] About your hospitalization You were admitted on:  December 14, 2017 You last received care in the:  [address] [place] [address] You were discharged on:  December 15, 2017 Why you were hospitalized Your primary diagnosis was:  Not on File Your diagnoses also included:  Severe Obesity With Body Mass Index (Bmi) Of 35.0 To 39.9 With Comorbidity (Hcc) Things You Need To Do (next 8 weeks) Follow up with Brent Fleischer, MD  
  
Phone:  [phone] Where:  [facility], [address]  [address] Thursday Dec 28, 2017 POST OP with Nazario Worrell NP at  3:30 PM  
Where: [facility] ([address] [place] [address]) Follow up with Roxana Hector MD  
Follow up appointment scheduled for December 28, 2017 at 3:30 p.m. Phone:  [phone] Where:  [address], [address] Thursday Jan 11, 2018 Office Visit with Nazario Worrell NP at  1:00 PM  
Where: [facility] ([address] [place] [address]) Office Visit with CORAL NUTRI VISIT PEN at  1:30 PM  
Where: [facility] ([address] [place] [address]) Discharge Orders None A check arsalan indicates which time of day the medication should be taken. My Medications STOP taking these medications BC HEADACHE POWDER PO  
   
  
 glipiZIDE 10 mg tablet Commonly known as:  GLUCOTROL  
   
  
 lisinopril-hydroCHLOROthiazide 10-12.5 mg per tablet Commonly known as:  PRINZIDE, ZESTORETIC  
   
  
 metFORMIN 1,000 mg tablet Commonly known as:  GLUCOPHAGE  
   
  
 VITAMIN D2 50,000 unit capsule Generic drug:  ergocalciferol TAKE these medications as instructed Instructions Each Dose to Equal  
 Morning Noon Evening Bedtime CeleXA 20 mg tablet Generic drug:  citalopram  
   
Your last dose was: Your next dose is: Take 20 mg by mouth daily. Indications: ANXIETY WITH DEPRESSION  
 20 mg HYDROmorphone 2 mg tablet Commonly known as:  DILAUDID Your last dose was: Your next dose is: Take 1 Tab by mouth every four (4) hours as needed for Pain. Max Daily Amount: 12 mg.  
 2 mg  
    
   
   
   
  
 multivitamin tablet Commonly known as:  ONE A DAY Your last dose was: Your next dose is: Take 1 Tab by mouth daily. 1 Tab Omeprazole delayed release 20 mg tablet Commonly known as:  PRILOSEC D/R Your last dose was: Your next dose is: Take 1 Tab by mouth daily. OTC  
 20 mg Discharge Instructions [facility] M.D., F.A.C.S. 
[address] [facility], [address] [facility], [address] [place] [address] Office: [phone]    Fax:  [phone] Discharge Instruction for Sleeve Gastrectomy Patients Diet: 
? Continue with the liquid diet until you are seen in the office. Make sure you sip fluids all day. Goal for total fluid intake is a minimum of 64 ounces per day. ? Aim for  Grams of protein every day. Activity: 
? Up walking at least once an hour during normal waking hours. ? Walk a minimum of 30 minutes every day for exercise. Rest when you are tired. ? You may shower. No baths, hot tubs or swimming. ? You may climb stairs. Take your time. ? No lifting more than 10-15 lbs. ? If you feel discomfort during an activity, rest. 
? Do not drive for 1 week or until you are off of all narcotics. Wound Care: ? Clean incisions with soap and water when in the shower. Pat dry. ? Leave steri-strips on until they fall off. ? A small amount of drainage may be present from the incisions. Contact the office if you notice an increase in drainage, an odor, increased redness, or fever > 100.5. Medications: 
? You should have received a prescription for pain medication and Prilosec prior to surgery, if not, notify Dr. Denice Mercado team. 
? For upset stomach you may take over the counter medications such as Maalox, Mylanta, or Pepto Bismol. ? Gas X works very well for bloating when eating or drinking. ? You may take Tylenol, do not exceed 4,000mg in one day. Percocet has Tylenol in it, you will need to consider this when taking Tylenol and Percocet together. ? Non-aspirin based arthritis medications may be resumed. ? Take a childrens or adult chewable multivitamin. ? Milk of Magnesia will help with constipation. ? It is fine to take your usual home medications. Blood pressure medications should be continued after surgery, unless it is a diuretic. Diabetic medications can frequently be reduced very quickly after surgery. Diabetics should continue to monitor blood sugars frequently after surgery and contact the prescribing physician for any questions. Follow-Up Appointment: 
? If you do not already have a follow-up appointment scheduled, contact the office in the next few days to obtain one. It is usually scheduled for 10-14 days after your surgery date. Office phone number:  [phone].  
? Call our office if you have questions, concerns or any worsening of condition. If you are not able to reach us, go to your primary care provider or the Emergency Department. [employer] Announcement We are excited to announce that we are making your provider's discharge notes available to you in Lust have it!hart. You will see these notes when they are completed and signed by the physician that discharged you from your recent hospital stay.   If you have any questions or concerns about any information you see in [employer], please call the Health Information Department where you were seen or reach out to your Primary Care Provider for more information about your plan of care. Introducing [facility]! Dear Candido Mcgraw: Thank you for requesting a [employer]Soft account. Our records indicate that you already have an active VSoft account. You can access your account anytime at [email or web address] Did you know that you can access your hospital and ER discharge instructions at any time in VSoft? You can also review all of your test results from your hospital stay or ER visit. Additional Information If you have questions, please visit the Frequently Asked Questions section of the VSoft website at [email or web address] Remember, VSoft is NOT to be used for urgent needs. For medical emergencies, dial 911. Now available from your iPhone and Android! Providers Seen During Your Hospitalization Provider Specialty Primary office phone Jude Munoz MD General Surgery [phone] Your Primary Care Physician (PCP) Primary Care Physician Office Phone Office Fax [address],[facility], [address] [facility] [phone] You are allergic to the following Allergen Reactions Percocet (Oxycodone-Acetaminophen) Nausea and Vomiting Recent Documentation Height Weight Breastfeeding? BMI OB Status Smoking Status 1.778 m 131.8 kg No 41.7 kg/m2 Polycystic Ovarian Syndrome Never Smoker Emergency Contacts Name Discharge Info Relation Home Work Mobile [address] CAREGIVER [3] Spouse [3]   [phone] Patient Belongings The following personal items are in your possession at time of discharge: 
  Dental Appliances: None  Visual Aid: At home, Glasses      Home Medications: None   Jewelry: Watch (apple watch with [place] Riddles)  Clothing:  (with Slava Riddles)    Other Valuables:  (with [place] Riddles) Please provide this summary of care documentation to your next provider. Signatures-by signing, you are acknowledging that this After Visit Summary has been reviewed with you and you have received a copy. Patient Signature:  ____________________________________________________________ Date:  ____________________________________________________________  
  
[facility] Provider Signature:  ____________________________________________________________ Date:  ____________________________________________________________

## 2017-12-14 NOTE — PERIOP NOTES
TRANSFER - IN REPORT:    Verbal report received from ORN & CRNA on Genaro Arciniega  being received from OR (unit) for routine post - op      Report consisted of patients Situation, Background, Assessment and   Recommendations(SBAR). Information from the following report(s) SBAR, Intake/Output and MAR was reviewed with the receiving nurse. Opportunity for questions and clarification was provided. Assessment completed upon patients arrival to unit and care assumed.

## 2017-12-14 NOTE — PROGRESS NOTES
Problem: Diabetes Self-Management  Goal: *Insulin pump training  Outcome: Resolved/Met Date Met: 12/14/17  N/A  Goal: *Patient Specific Goal (EDIT GOAL, INSERT TEXT)  Outcome: Resolved/Met Date Met: 12/14/17  Pt did not offer additional goal    Problem: Falls - Risk of  Goal: *Absence of Falls  Document Kareen Fall Risk and appropriate interventions in the flowsheet.    Outcome: Progressing Towards Goal  Fall Risk Interventions:  Mobility Interventions: Assess mobility with egress test, Communicate number of staff needed for ambulation/transfer, Patient to call before getting OOB         Medication Interventions: Patient to call before getting OOB, Teach patient to arise slowly    Elimination Interventions: Call light in reach, Patient to call for help with toileting needs, Toileting schedule/hourly rounds             Problem: Gastric Sleeve Pathway / Bariatric Revision Pathway: Day of Surgery  Goal: Off Pathway (Use only if patient is Off Pathway)  Outcome: Resolved/Met Date Met: 12/14/17  N/A  Goal: *Demonstrates progressive activity  Outcome: Not Progressing Towards Goal  Has not been out of bed yet

## 2017-12-14 NOTE — PROGRESS NOTES
PM check -      Pt awake and alert C/O expected discomfort.   Patient has a great sense of humor considering she just had surgery a few hours ago.     Mild nausea     Visit Vitals    /73 (BP 1 Location: Right arm, BP Patient Position: At rest)    Pulse 67    Temp 97.9 °F (36.6 °C)    Resp 14    Ht 5' 10\" (1.778 m)    Wt 123.4 kg (272 lb 1 oz)    LMP 08/01/2017    SpO2 100%    Breastfeeding No    BMI 39.04 kg/m2       MINERVA with serosang output       Plan:  -Continue medications  -Encourage ambulation  -SCD use when in bed  -IS 10 times an hour  -NPO  -UGI and start diet in AM

## 2017-12-14 NOTE — H&P (VIEW-ONLY)
Sleeve Gastrectomy - History and Physical    Subjective: The patient is a 27 y.o. obese female with a Body mass index is 39.6 kg/(m^2). .   she presents now to review their work up to date to see if they are a candidate for surgery and whether or not to proceed with the previously requested procedure. Bariatric comorbidities continue to include:   Patient Active Problem List   Diagnosis Code    PCOS (polycystic ovarian syndrome) E28.2    Diabetes ([facility] [address] E11.9    Endometrial polyp N84.0    Morbid obesity ([facility] [address] E66.01    Morbid obesity with body mass index of 40.0-49.9 ([facility]) E66.01    Hypertension I10    Irregular menses N92.6    Arthritic-like pain M25.50    Chronic back pain M54.9, G89.29       They have been generally well prior to this visit and have had no recent significant illnesses. The patient has had no gastrointestinal issues that would preclude them from proceeding with the surgery they have chosen. Aki Riggs has recently tried a preoperative weight loss program  in addition to seeing a bariatric nutritionist preoperatively. We have discussed on at least one other occasion about the various types of surgical weight loss procedures and they have considered these options after our initial consultation. We have once again discussed these procedures in detail and they have now decided on a surgical procedure. They present today to discuss this and confirm that their evaluation pre operatively is acceptable to continue with surgery. The patient desires laparoscopic sleeve gastrectomy for surgical weight loss. The patients goal weight is 170lb. These goals are consistent with expected outcomes of their desired operation. her Medical goals are resolution of these health issues.     Patient Active Problem List    Diagnosis Date Noted    Diabetes ([facility] [address]     Endometrial polyp     Morbid obesity ([facility] [address]     Morbid obesity with body mass index of 40.0-49.9 ([address]     Hypertension     Irregular menses     Arthritic-like pain     Chronic back pain     PCOS (polycystic ovarian syndrome) 12/03/2014     Past Surgical History:   Procedure Laterality Date    HX GYN  03/31/2017    D&C      Social History   Substance Use Topics    Smoking status: Never Smoker    Smokeless tobacco: Never Used    Alcohol use 0.6 oz/week     1 Shots of liquor per week      Family History   Problem Relation Age of Onset    Diabetes Maternal Aunt     Diabetes Paternal Grandmother     Diabetes Paternal Grandfather     Diabetes Mother       Current Outpatient Prescriptions   Medication Sig Dispense Refill    azithromycin (ZITHROMAX Z-MANDY) 250 mg tablet Take 1 Tab by mouth See Admin Instructions. Take 2 pills by mouth on the first day then 1 pill by mouth daily until done 6 Tab 0    ergocalciferol (VITAMIN D2) 50,000 unit capsule Take 50,000 Units by mouth every seven (7) days. Indications: Vitamin D Deficiency      glipiZIDE (GLUCOTROL) 10 mg tablet Take 10 mg by mouth daily. Indications: type 2 diabetes mellitus      multivitamin (ONE A DAY) tablet Take 1 Tab by mouth daily.  metFORMIN (GLUCOPHAGE) 1,000 mg tablet Take 1 Tab by mouth two (2) times daily (with meals). Indications: type 2 diabetes mellitus 30 Tab 0    citalopram (CELEXA) 20 mg tablet Take 20 mg by mouth daily. Indications: ANXIETY WITH DEPRESSION      lisinopril-hydrochlorothiazide (PRINZIDE, ZESTORETIC) 10-12.5 mg per tablet Take 1 tablet by mouth daily. (Patient taking differently: Take 1 Tab by mouth daily. Indications: kidney perfusion) 30 tablet 5    oxyCODONE-acetaminophen (PERCOCET) 5-325 mg per tablet Take 1 Tab by mouth every four (4) hours as needed for Pain. Max Daily Amount: 6 Tabs. 30 Tab 0    Omeprazole delayed release (PRILOSEC D/R) 20 mg tablet Take 1 Tab by mouth daily.  OTC 30 Tab 1     Allergies   Allergen Reactions    Percocet [Oxycodone-Acetaminophen] Nausea and Vomiting          Review of Systems:     General - No history or complaints of unexpected fever, chills, or weight loss  Head/Neck - No history or complaints of headache, diplopia, dysphagia, hearing loss  Cardiac - No history or complaints of chest pain, palpitations, murmur, or shortness of breath  Pulmonary - No history or complaints of shortness of breath, productive cough, hemoptysis  Gastrointestinal - minimal reflux,no  abdominal pain, obstipation/constipation or blood per rectum  Genitourinary - No history or complaints of hematuria/dysuria, stress urinary incontinence symptoms, or renal lithiasis  Musculoskeletal - minimal joint pain ,  no muscular weakness  Hematologic - No history or complaints of bleeding disorders,  No blood transfusions  Neurologic - No history or complaints of  migraine headaches, seizure activity, syncopal episodes, TIA or stroke  Integumentary - No history or complaints of rashes, abnormal nevi, skin cancer  Gynecological - sporadic menses               Objective:     Visit Vitals    /82 (BP 1 Location: Left arm, BP Patient Position: Sitting)    Pulse 71    Resp 16    Ht 5' 10\" (1.778 m)    Wt 125.2 kg (276 lb)    LMP 08/01/2017    SpO2 100%    BMI 39.6 kg/m2       Physical Examination: General appearance - alert, well appearing, and in no distress and oriented to person, place, and time  Mental status - alert, oriented to person, place, and time, normal mood, behavior, speech, dress, motor activity, and thought processes  Eyes - pupils equal and reactive, extraocular eye movements intact, sclera anicteric, left eye normal, right eye normal  Ears - right ear normal, left ear normal  Nose - normal and patent, no erythema, discharge or polyps  Mouth - mucous membranes moist, pharynx normal without lesions  Neck - supple, no significant adenopathy  Lymphatics - no palpable lymphadenopathy, no hepatosplenomegaly  Chest - clear to auscultation, no wheezes, rales or rhonchi, symmetric air entry  Heart - normal rate, regular rhythm, normal S1, S2, no murmurs, rubs, clicks or gallops  Abdomen - soft, nontender, nondistended, no masses or organomegaly  Back exam - full range of motion, no tenderness, palpable spasm or pain on motion  Neurological - alert, oriented, normal speech, no focal findings or movement disorder noted  Musculoskeletal - no joint tenderness, deformity or swelling  Extremities - peripheral pulses normal, no pedal edema, no clubbing or cyanosis    Labs :     Lab Results   Component Value Date/Time    WBC 13.8 12/04/2017 12:20 PM    HGB 12.0 12/04/2017 12:20 PM    HCT 36.4 12/04/2017 12:20 PM    PLATELET 797 70/51/4234 12:20 PM    MCV 82.9 12/04/2017 12:20 PM     Lab Results   Component Value Date/Time    Sodium 140 12/04/2017 12:20 PM    Potassium 4.1 12/04/2017 12:20 PM    Chloride 103 12/04/2017 12:20 PM    CO2 29 12/04/2017 12:20 PM    Anion gap 8 12/04/2017 12:20 PM    Glucose 110 12/04/2017 12:20 PM    BUN 6 12/04/2017 12:20 PM    Creatinine 0.71 12/04/2017 12:20 PM    BUN/Creatinine ratio 8 12/04/2017 12:20 PM    GFR est AA >60 12/04/2017 12:20 PM    GFR est non-AA >60 12/04/2017 12:20 PM    Calcium 9.4 12/04/2017 12:20 PM    Bilirubin, total 0.3 12/04/2017 12:20 PM    AST (SGOT) 10 12/04/2017 12:20 PM    Alk. phosphatase 83 12/04/2017 12:20 PM    Protein, total 7.4 12/04/2017 12:20 PM    Albumin 3.8 12/04/2017 12:20 PM    Globulin 3.6 12/04/2017 12:20 PM    A-G Ratio 1.1 12/04/2017 12:20 PM    ALT (SGPT) 25 12/04/2017 12:20 PM     No results found for: IRON, FE, TIBC, IBCT, PSAT, FERR  No results found for: FOL, RBCF  Lab Results   Component Value Date/Time    Vitamin D 25-Hydroxy 31.1 12/03/2014 02:05 PM               Cardiac / Pulmonary Evaluation:            UGI Results:     Procedures  Date of Service: 10/11/17 [address]   Physician Assistant   Cosigned by:  Tiffany Moya MD at 10/12/17 1352      []Hide copied text  []Carol for attribution information  Yonatan Park : 1985  Medical Record Number:[id number]                 PREPROCEDURE DIAGNOSIS: This patient is preoperative for laparoscopic sleeve gastrectomyprocedure with a history of  reflux disease.     POSTPROCEDURE DIAGNOSIS: This patient is preoperative for laparoscopic sleeve gastrectomyprocedure with a history of  reflux disease.        PROCEDURES PERFORMED: Upper GI study with barium.     ESTIMATED BLOOD LOSS: None.     SPECIMENS: None.     STATEMENT OF MEDICAL NECESSITY: The patient is a patient with a  longstanding history of obesity. They are now considering the laparoscopic sleeve gastrectomyprocedure as a means of surgical weight control and due to their history of reflux disease and are being assessed preoperatively for such.     DESCRIPTION OF PROCEDURE: The patient was brought to the fluoroscopy unit and  was given thin barium. On swallowing of barium, they were noted to have  normal peristalsis of their esophagus. They had prompt filling of distal  esophagus with tapering into the gastroesophageal junction. There was no evidence of a hiatal hernia present. Contrast then filled the gastric cardia, fundus,body and pre pyloric region with no abnormalities noted. Contrast then exited the pylorus in normal fashion. No obstruction was noted. There was no evidence of reflux noted.     (normal anatomy)     Carlito Vázquez MD      Electronically signed by KALEY More at 10/11/17 1447   Electronically signed by Almedia Gosselin, MD at 10/12/17 1352           Assessment:     Morbid obesity with comorbidity    Plan:     laparoscopic sleeve gastrectomy    This is a 27 y.o. female with a BMI of Body mass index is 39.6 kg/(m^2). and the weight-related co-morbidties as noted above. [facility] meets the NIH criteria for bariatric surgery based upon the BMI of Body mass index is 39.6 kg/(m^2). and multiple weight-related co-morbidties.  [facility] has elected laparoscopic sleeve gastrectomy as her intervention of choice for treatment of morbid obestiy through surgical means secondary to its safety profile, rapid return to work  and decreases in operative risks over gastric bypass. In the office today, following Dulce's history and physical examination, a 40 minute discussion regarding the anatomic alterations for the laparoscopic sleeve gastrectomy was undertaken. The dietary expectations and the patient  dependent factors for success were thoroughly discussed, to include the need for interval follow-up and long-term dietary changes associated with success. The possible complications of the sleeve gastrectomy  were also discussed, to include;death, DVT/PE, staple line leak, bleeding, stricture formation, infection, nutritional deficiencies and sleeve dilation. Specific weight related outcomes for success were also discussed with an emphasis on careful and close follow-up with the first year and eating behavior modification as the baseline and cyclical hunger return. The patient expressed an understanding of the above factors, and her questions were answered in their entirety. In addition, the patient attended a 1.5 hour power point seminar regarding obesity, surgical weight loss including, adjustable gastric band, gastric bypass, and sleeve gastrectomy. This discussion contrasted the different surgical techniques, mechanisms of actions and expected outcomes, and surgical and medical risks associated with each procedure. During this seminar, there was a long question and answer session where each questions was answered until there were no additional questions. Today, the patient had all of her questions answered and the decision was made today that the patient's preoperative evaluation is acceptable for them  to proceed with bariatric surgery  choosing the sleeve gastrectomy as her surgical option.         Secondary Diagnoses:     DVT / Pulmonary Embolus Risk - The patient is at a higher risk for post operative DVT / pulmonary embolus secondary to their morbid obese status, relative sedentary lifestyle, and impending general anesthetic. We will plan to use anticoagulation therapy pre and post operative as well as TEDs and  pneumatic compression devices and encourage ambulation once on the hospital nursing floor. The need for possible at home anticoagulation therapy has also been discussed and any decision on this matter will be made during post operative evaluations. The patient understands that their efforts at ambulation are of vital importance to reduce the risk of this complication thus placing significant burden on them as to the prevention of such issues. Signs and symptoms of DVT / PE have been discussed with the patient and they have been instructed to call the office if any these occur in the \"at home\" post op phase. Hypertension - The patient has a clear understanding of how weight loss improves hypertension as a whole, but also they understand that there is a significant genetic component to this disease process. We will monitor the patients blood pressure while in the hospital and the plan would be to continue those medications postoperatively.  If a diuretic is being used we will stop them on discharge to prevent dehydration particularly with the sleeve gastrectomy and the gastric bypass procedures.  They will be instructed to monitor their blood pressure postoperatively while at home and notify their primary care physician in the event of any significantly high or uncharacteristic readings.     GERD -The patient understands that weight loss surgery is not a guaranteed cure for reflux disease but does understand the benefits that weight loss can have on reflux disease.  They also understand that at the time of surgery the gastroesophageal junction will be evaluated for the presence of a diaphragmatic hernia.  Hernias will be corrected always with the gastric band and sleeve gastrectomy procedures, but only on a case by case basis with the gastric bypass if it prevents our ability to perform the operation at hand, or if I feel that they would benefit long term with correction of this issue.  The patient also understands that neither weight loss surgery nor repair of a diaphragmatic hernia repair guarantees the complete cessation of the disease. They also understand there is a possibility of recurrence with a simple crural repair as is performed with these procedures. They understand they may have to continue their medications in the postoperative period. They have a good understanding that the gastric bypass procedure is better suited to total resolution of this issue and that neither the Lap Band nor sleeve gastrectomy is considered a curative procedure as it pertains to this diagnosis. Adult Onset Diabetes - The patient has lisa given a very low carbohydrate diet preoperatively along with instructions to monitor their blood sugars on a regular daily basis. When  their surgery is performed  we will be monitoring the patient with sliding scale insulin and accuchecks.  Based on those values we will determine whether the patient needs a reduction of those medications postoperatively or total removal of those medications on discharge.  We will have the patient continue accuchecks postoperatively while at home also and report to me or their family physician for appropriate adjustments as needed.  The patient also understands that in the event of uncontrolled blood sugar preoperatively that we may choose to postpone their surgery.     Polycystic Ovarian Syndrome -The patient does understand the association between obesity and the development of PCOS.  They understand that weight loss can often improve symptoms and in many cases cure the disease.  If the disease is associated with infertility this too is often corrected with adequate weight loss.  The patient understands that any change to the pharmaceutical treatment of her PCOS will be managed by the primary care physician or OB/GYN        Signed By: Amy Preston MD     December 4, 2017

## 2017-12-14 NOTE — OP NOTES
OPERATIVE REPORT         Patient:Dulce Trotter   : 1985  Medical Record Number:[id number]    Pre-operative Diagnosis:  HYPERTENSION, DIABETES, MORBID OBESITY, BMI 41, FATTY LIVER  Post-operative Diagnosis: HYPERTENSION, DIABETES, MORBID OBESITY, BMI 41, FATTY LIVER  Procedure: Procedure(s):  LAPAROSCOPIC GASTRIC SLEEVE, WEDGE  LIVER BIOPSY AND INTRAOPERATIVE ENDOSCOPY WITH BIOPSY  Location: [facility]  Surgeon: Efren Sabillon MD  Assistant:  Niranjan [facility] - performed retraction of various structures,  assisted in creation of the gastric sleeve, fired stapling devices, obtained hemostasis along staple lines via hemoclips, applied Eviseal,  retrieved all specimens from the abdominal cavity, closed fascial defect, and sutured incisions      Anesthesia: General       Specimens: 1. Gastric Sleeve Resection                       2. Liver Wedge Biopsy    EBL: less than 20 cc  Additional Findings: none             STATEMENT OF MEDICAL NECESSITY: The patient is a 27y.o.-year-old female who has had a history of obesity. she has failed conservative weight loss measures,   such began to consider weight loss surgical options. she chose the   sleeve gastrectomy as a means of surgical weight control. she has undergone   nutritional and psychological teaching at this time period and does wish to proceed   with sleeve gastrectomy. OPERATIVE PROCEDURE: The patient was brought to the operating room, placed   on the table in supine position at which time general  anesthesia was administered   without any difficulty. The abdomen was then prepped and draped in the   usual sterile fashion. Using a 15 blade, a 1 cm incision was made just to the   left of the umbilicus. The veress needle approach was used to gain access to   the peritoneal cavity which was then insufflated.  The Visi-Port was then placed   at that site,then 4 additional trocars were placed in the usual U-shaped   configuration with a subxiphoid incision being made to accommodate the   [facility] retractor. On entering the abdomen, the patient was noted to have a   moderate fatty liver with possible evidence of early steatohepatitis. I elevated the liver and noted the patient had no diaphragmatic hernia present. I began the operation by choosing an area 2-3 cm proximal to the pylorus and within the gastroepiploic vessels I began to divide off these vessels individually. I moved cephalad toward short gastric vessels, which were very difficult to take down due to the proximity to the splenic hilum. I was able to do so, clearing the entire left crural area. I then placed a Visigi tube, impacting at the distal antrum. I then began the resection with the powered [place]   stapler using the green loads for the first firing tangential along the   antral region. The second and subsequent firings were used with gold  reloads  reaching just past the incisura region. The remainder of the 5 vertical   firings completed the resection at the left crural region. I then tested   the pouch via the Visigi tube using dilute methylene blue,it was noted to be completely   Watertight. I then left the operative field and proceeded to the the head of the bed and performed an intraoperative EGD. The scope was passed successfully into the gastric sleeve to the level of the pylorus. Biopsies were taken of this region and submitted to test both for H Pylori and for pathologic diagnosis. There was no bleeding noted and no leak appreciated with air insufflation. I then returned to the surgical field. I then obtained hemostasis along the staple line using   Hemoclips and sutures where needed. I then used 3 separate 2-0 Ethibond sutures to secure the lateral aspect of the newly created sleeve stomach to the resected edge of the gastrocolic omentum in an effort to maintain the continuity of the sleeve and prevent twisting.  I then used Eviseal along the entire staple line to obtain hemostasis and then place Surgicel Snow over the staple line also. With all this having been completed, I then removed the liver retractor. I performed a liver wedge biopsy of the left lobe of the liver due to its abnormality and submitted to pathology for permanent section. I then placed a MINERVA drain in the left upper quadrant region along the staple line. I removed the specimen from the operative field via the LLQ incision. I closed the left lower quadrant trocar site using a transabdominal #0 PDS suture along the fascia, and all skin incisions were then closed using 4-0 subcuticular Monocryl. Steri-Strips and sterile dressings were applied. The patient tolerated the procedure well.        Javier Colmenares M.D.

## 2017-12-14 NOTE — PROGRESS NOTES
1315 - assumed c/o patient    Patient resting comfortably in bed, in and out of sleep. C/o pain that is aggravated by movement. Arrives on 2L O2 via NC. To attempt wean when pt is alert. At present, pt responds appropriately to questions, however drowsy. 1400 -  and mother at bedside, supportive and involved in care. Summary: Pt tolerated wean from O2 2L NC to RA well. Has ambulated once in hallway with Vickki Drafts, [address] Tolerated well and appears motivated to comply with POC. Family at bedside for majority of shift and very supportive. Pt comfortable on current pain medication regimen. No breakthrough drainage noted on 5 trochar sites.     Total urinary output for shift: 400mL (66mL/hr)    Total MINERVA drainage for shift: 20mL    Total IV fluids for shift: 982.50mL (ofirmev, LR @150/hr, ancef)    Lab Results   Component Value Date/Time    Glucose 110 12/04/2017 12:20 PM    Glucose (POC) 188 12/14/2017 06:26 PM     Patient Vitals for the past 12 hrs:   Temp Pulse Resp BP SpO2   12/14/17 1715 96.5 °F (35.8 °C) 69 15 124/72 100 %   12/14/17 1657 - 64 15 - 100 %   12/14/17 1639 - 65 15 - 100 %   12/14/17 1619 97.9 °F (36.6 °C) 67 14 132/73 100 %   12/14/17 1516 96.9 °F (36.1 °C) 62 16 147/83 100 %   12/14/17 1411 97.6 °F (36.4 °C) 66 17 144/74 100 %   12/14/17 1315 97.2 °F (36.2 °C) 60 16 144/85 100 %   12/14/17 1300 98 °F (36.7 °C) 60 16 154/85 99 %   12/14/17 1255 - 61 17 150/83 100 %   12/14/17 1250 - 62 15 146/82 99 %   12/14/17 1245 98.5 °F (36.9 °C) 61 16 141/73 95 %   12/14/17 1240 - 63 18 154/76 96 %   12/14/17 1235 - - - - 99 %   12/14/17 1230 - 65 17 (!) 151/93 97 %   12/14/17 1225 - (!) 58 16 138/67 97 %   12/14/17 1220 - (!) 59 16 151/75 97 %   12/14/17 1215 - 63 18 168/81 97 %   12/14/17 1210 - 67 18 153/84 95 %   12/14/17 1205 - 62 14 156/81 97 %   12/14/17 1200 - 63 15 143/80 95 %   12/14/17 1159 - 62 17 - 96 %   12/14/17 1158 - 71 19 - 97 %   12/14/17 1157 - 66 15 - 95 %   12/14/17 1156 - 64 16 - 97 %   12/14/17 1155 - 72 19 148/82 98 %   12/14/17 1154 - 72 19 - 97 %   12/14/17 1153 - 71 19 - 97 %   12/14/17 1152 - 67 17 - 96 %   12/14/17 1151 - 68 16 - 100 %   12/14/17 1150 - 63 - (!) 158/92 100 %   12/14/17 1149 - - - - 98 %   12/14/17 1148 - - - - 98 %   12/14/17 1147 - 63 14 - 98 %   12/14/17 1140 99.8 °F (37.7 °C) 62 14 133/76 100 %   12/14/17 1139 99.8 °F (37.7 °C) - - - 100 %   12/14/17 0758 97.7 °F (36.5 °C) 79 17 126/74 100 %

## 2017-12-14 NOTE — PERIOP NOTES
TRANSFER - OUT REPORT:    Verbal report given to KIERAR Varela RN(name) on Stanislav Battles  being transferred to [facility], room 326(unit) for routine progression of care       Report consisted of patients Situation, Background, Assessment and   Recommendations(SBAR). Information from the following report(s) SBAR, OR Summary, Procedure Summary, Intake/Output and MAR was reviewed with the receiving nurse. Lines:   Peripheral IV 12/14/17 Left Hand (Active)   Site Assessment Clean, dry, & intact 12/14/2017 12:45 PM   Phlebitis Assessment 0 12/14/2017 12:45 PM   Infiltration Assessment 0 12/14/2017 12:45 PM   Dressing Status Clean, dry, & intact 12/14/2017 12:45 PM   Dressing Type Transparent;Tape 12/14/2017 12:45 PM   Hub Color/Line Status Green; Infusing;Patent 12/14/2017 12:45 PM        Opportunity for questions and clarification was provided.       Patient transported with:   O2 @ 2 liters  Registered Nurse  [facility]

## 2017-12-14 NOTE — IP AVS SNAPSHOT
[address] [place] [id number] 
[phone] Patient: Terell Holbrook MRN: [id number] My Medications STOP taking these medications BC HEADACHE POWDER PO  
   
  
 glipiZIDE 10 mg tablet Commonly known as:  GLUCOTROL  
   
  
 lisinopril-hydroCHLOROthiazide 10-12.5 mg per tablet Commonly known as:  PRINZIDE, ZESTORETIC  
   
  
 metFORMIN 1,000 mg tablet Commonly known as:  GLUCOPHAGE  
   
  
 VITAMIN D2 50,000 unit capsule Generic drug:  ergocalciferol TAKE these medications as instructed Instructions Each Dose to Equal  
 Morning Noon Evening Bedtime CeleXA 20 mg tablet Generic drug:  citalopram  
   
Your last dose was: Your next dose is: Take 20 mg by mouth daily. Indications: ANXIETY WITH DEPRESSION  
 20 mg HYDROmorphone 2 mg tablet Commonly known as:  DILAUDID Your last dose was: Your next dose is: Take 1 Tab by mouth every four (4) hours as needed for Pain. Max Daily Amount: 12 mg.  
 2 mg  
    
   
   
   
  
 multivitamin tablet Commonly known as:  ONE A DAY Your last dose was: Your next dose is: Take 1 Tab by mouth daily. 1 Tab Omeprazole delayed release 20 mg tablet Commonly known as:  PRILOSEC D/R Your last dose was: Your next dose is: Take 1 Tab by mouth daily.  OTC  
 20 mg

## 2017-12-14 NOTE — INTERVAL H&P NOTE
H&P Update:  Hima Gallegos was seen and examined. History and physical has been reviewed. The patient has been examined.  There have been no significant clinical changes since the completion of the originally dated History and Physical.    Signed By: Rogelio Arellano MD     December 14, 2017 7:17 AM

## 2017-12-14 NOTE — PERIOP NOTES
Reviewed PTA medication list with patient/caregiver and patient/caregiver denies any additional medications.  .

## 2017-12-15 ENCOUNTER — APPOINTMENT (OUTPATIENT)
Dept: GENERAL RADIOLOGY | Age: 32
DRG: 621 | End: 2017-12-15
Attending: SPECIALIST
Payer: COMMERCIAL

## 2017-12-15 VITALS
WEIGHT: 290.6 LBS | RESPIRATION RATE: 16 BRPM | HEIGHT: 70 IN | BODY MASS INDEX: 41.6 KG/M2 | HEART RATE: 72 BPM | SYSTOLIC BLOOD PRESSURE: 140 MMHG | DIASTOLIC BLOOD PRESSURE: 71 MMHG | TEMPERATURE: 97.5 F | OXYGEN SATURATION: 96 %

## 2017-12-15 LAB
GLUCOSE BLD STRIP.AUTO-MCNC: 105 MG/DL (ref 70–110)
GLUCOSE BLD STRIP.AUTO-MCNC: 117 MG/DL (ref 70–110)
GLUCOSE BLD STRIP.AUTO-MCNC: 143 MG/DL (ref 70–110)

## 2017-12-15 PROCEDURE — 82962 GLUCOSE BLOOD TEST: CPT

## 2017-12-15 PROCEDURE — 74240 X-RAY XM UPR GI TRC 1CNTRST: CPT

## 2017-12-15 PROCEDURE — 74011000250 HC RX REV CODE- 250: Performed by: SPECIALIST

## 2017-12-15 PROCEDURE — 74011636320 HC RX REV CODE- 636/320: Performed by: SPECIALIST

## 2017-12-15 PROCEDURE — 74011250636 HC RX REV CODE- 250/636: Performed by: SPECIALIST

## 2017-12-15 RX ORDER — OXYCODONE AND ACETAMINOPHEN 5; 325 MG/1; MG/1
1-2 TABLET ORAL
Status: DISCONTINUED | OUTPATIENT
Start: 2017-12-15 | End: 2017-12-15 | Stop reason: HOSPADM

## 2017-12-15 RX ADMIN — ENOXAPARIN SODIUM 40 MG: 40 INJECTION SUBCUTANEOUS at 10:39

## 2017-12-15 RX ADMIN — IOHEXOL 70 ML: 240 INJECTION, SOLUTION INTRATHECAL; INTRAVASCULAR; INTRAVENOUS; ORAL at 09:57

## 2017-12-15 RX ADMIN — HYDROMORPHONE HYDROCHLORIDE 1 MG: 1 INJECTION, SOLUTION INTRAMUSCULAR; INTRAVENOUS; SUBCUTANEOUS at 10:39

## 2017-12-15 RX ADMIN — KETOROLAC TROMETHAMINE 30 MG: 30 INJECTION, SOLUTION INTRAMUSCULAR at 10:38

## 2017-12-15 RX ADMIN — ACETAMINOPHEN 1000 MG: 10 INJECTION, SOLUTION INTRAVENOUS at 05:59

## 2017-12-15 RX ADMIN — Medication 3 G: at 02:05

## 2017-12-15 RX ADMIN — SODIUM CHLORIDE 12.5 MG: 9 INJECTION INTRAMUSCULAR; INTRAVENOUS; SUBCUTANEOUS at 01:35

## 2017-12-15 RX ADMIN — KETOROLAC TROMETHAMINE 30 MG: 30 INJECTION, SOLUTION INTRAMUSCULAR at 05:59

## 2017-12-15 NOTE — PROGRESS NOTES
Pt admitted for an elective surgical procedure. Pt is independent. Please encourage ambulation. No plan of care needs identified. Anticipate pt will be medically stable for discharge within the next 24-48 hours. CM available to assist as needed. Discharge Reassessment Plan:  Low Risk    RRAT Score:  1 - 12     Low Risk Care Transition Interventions:  1. Discharge transition plan:  Physician follow up  2. Involved patient/caregiver in assessment, planning, education and implement of intervention. 3. CM daily patient care huddles/interdisciplinary rounds were completed. 4. PCP/Specialist appointment within 5 days made prior to discharge. Date/Time  5. Facilitated transportation and logistics for follow-up appointments. 6. Handoff to [address] [place] [address] Nurse Navigator or PCP practice. Care Management Interventions  PCP Verified by CM: Yes  Mode of Transport at Discharge:  Other (see comment) (spouse)  Transition of Care Consult (CM Consult): Discharge Planning  Health Maintenance Reviewed: Yes  Current Support Network: Lives with Spouse  Confirm Follow Up Transport: Self  Discharge Location  Discharge Placement: Home with family assistance

## 2017-12-15 NOTE — PROGRESS NOTES
Shift Summary:  Patient pain level at tolerable level to where patient did not require additional pain medication aside from her schedule Toradol and Ofirmev IV. Patient nauseous and vomiting brownish emesis overnight requiring Phenergan 12.5 mg IV which kept patient drowsy. Patient refused ambulating in hallway this morning due to drowsiness and feeling lightheaded. Vital signs WNL. Patient will ambulate later when more awake. Bowel sounds hypoactive, passing flatus. Lap sites CDI. MINERVA drain intact with serosanguineous drainage, 20 ml. Voiding without difficulty. BS this a.m., 117.     Patient Vitals for the past 12 hrs:   Temp Pulse Resp BP SpO2   12/15/17 0816 97.8 °F (36.6 °C) 67 16 132/73 97 %   12/15/17 0355 98.2 °F (36.8 °C) 61 16 140/77 100 %   12/14/17 2334 98.1 °F (36.7 °C) 71 18 130/85 97 %   12/14/17 2042 - - - - 94 %

## 2017-12-15 NOTE — ROUTINE PROCESS
Bedside shift change report given to Ananda Suazo RN (oncoming nurse) by Christina Garcia RN (offgoing nurse). Report included the following information SBAR, Kardex, OR Summary, Intake/Output, MAR and Recent Results.

## 2017-12-15 NOTE — DIABETES MGMT
NUTRITION ASSESSMENT / GLYCEMIC CONTROL PLAN OF CARE     Yuliana Fu           27 y. o.              Patient Active Problem List   Diagnosis Code    PCOS (polycystic ovarian syndrome) E28.2    Diabetes ([facility] E11.9    Endometrial polyp N84.0    Morbid obesity ([facility] E66.01    Morbid obesity with body mass index of 40.0-49.9 (HCC) E66.01    Hypertension I10    Irregular menses N92.6    Arthritic-like pain M25.50    Chronic back pain M54.9, G89.29    Severe obesity with body mass index (BMI) of 35.0 to 39.9 with comorbidity (HCC) E66.01        INTERVENTIONS/PLAN:     - BG out of target range, known h/o DM2, pt s/p bariatric surgery, corrective coverage orders in place, pt to follow-up with OP PCM prior to resuming any DM meds at home  - DM Education provided per [facility] RD & RN (see additional note)  - encouraged OP DM Self Management Class (schedule given)    ASSESSMENT:   Nutritional Status: morbid obesity, BG out of target r/t uncontrolled DM2, pt s/p bariatric surgery, gap in understanding between self-care & impact on health       Lab Results   Component Value Date/Time    GLUCPOC 105 12/15/2017 12:31 PM    GLUCPOC 117 (H) 12/15/2017 06:12 AM    GLUCPOC 143 (H) 12/15/2017 12:17 AM             Within target range (non-ICU: <140; ICU<180): [x] Yes   []  No    Current Insulin regimen:   - Humalog Very Insulin Resistant Corrective Coverage    Home medication/insulin regimen:   - Glipizide 10 mg every day  - Metformin 1000 mg BID    HbA1c: equivalent  to ave BGlucose of 183 mg/dl for 2-3 months prior to admission    Lab Results   Component Value Date/Time    Hemoglobin A1c 8.0 12/04/2017 12:21 PM       Adequate glycemic control PTA:  [] Yes  [x] No       SUBJECTIVE/OBJECTIVE:   Information obtained from: pt, chart; pt able to confirm home regimen PTA (see above), pt reports SMBG daily, encouraged to continue SMBG and to follow-up with OP PCM re: DM regimen prior to resuming any home DM meds, pt verbalized understanding, see additional GC RN ed note        Medications: [x]                Reviewed        Labs:   Lab Results   Component Value Date/Time    Sodium 140 12/04/2017 12:20 PM    Potassium 4.1 12/04/2017 12:20 PM    Chloride 103 12/04/2017 12:20 PM    CO2 29 12/04/2017 12:20 PM    Anion gap 8 12/04/2017 12:20 PM    Glucose 110 12/04/2017 12:20 PM    BUN 6 12/04/2017 12:20 PM    Creatinine 0.71 12/04/2017 12:20 PM    Calcium 9.4 12/04/2017 12:20 PM    Magnesium 1.5 04/16/2017 04:50 PM    Albumin 3.8 12/04/2017 12:20 PM       Anthropometrics: IBW : 77.1 kg (170 lb), % IBW (Calculated): 170.94 %, BMI (calculated): 41.7  Wt Readings from Last 1 Encounters:   12/15/17 131.8 kg (290 lb 9.6 oz)    Ht Readings from Last 1 Encounters:   12/15/17 5' 10\" (1.778 m)       Estimated Nutrition Needs: 1925 Kcals/day (25 kcal/kg of IBW), Protein (g): 115 g (1.5 g/kg) Fluid (ml): 1925 ml (1 ml/kcal)   (*please defer to Bariatric Team for specific nutrition recommendations, pt is s/p bariatric surgery and is being followed by OP Bariatric RD)    Based on:   []          Actual BW    [x]          IBW   []            Adjusted BW        Diet:   Active Orders   Diet    DIET BARIATRIC FULL LIQUID No Conc.  Sweets       Intake:   Patient Vitals for the past 100 hrs:   % Diet Eaten   12/15/17 0600 0 %       Nutrition Diagnoses:   - altered nutrition-related lab values r/t Dm AEB A1C of 8.0% and known h/o DM2  - morbid obesity r/t h/o excessive energy intake AEB BMI of 41%  - lack of adherence to DM regimen and nutrition recommendations r/t DM AEB A1C of 8.0% and observed gap in understanding between self-care and impact on health       Nutrition Interventions:   - education, diet Bariatric Diet     Goal: .  - BG will be in target range of  mg/dl (non-ICU) by 12/18/2017  - PO intake will be at least 50% of meals offered by 12/18/2017  - pt will check BG at least 1x/day at home by 12/18/2017  - pt will maintain current wt/prevent wt gain by 1/15/2018  - pt will follow up with OP PCP for DM by 1/15/2018     Nutrition Monitoring and Evaluation      [x]     Monitor po intake on meal rounds  [x]     Continue inpatient monitoring and intervention  [x]     Other: BG      Nutrition Risk:  []   High     []  Moderate    [x]  Minimal/Uncompromised    YENI Thrasher, MPH, RD, CDE

## 2017-12-15 NOTE — DISCHARGE SUMMARY
Discharge Summary    Patient: Danielle Victor               Sex: female          DOA: 12/14/2017         YOB: 1985      Age:  27 y.o.        LOS:  LOS: 1 day                Admit Date: 12/14/2017    Discharge Date: 12/15/2017    Admission Diagnoses: HYPERTENSION, DIABETES, MORBID OBESITY, BMI 41  Severe obesity with body mass index (BMI) of 35.0 to 39.9 with comorbidity [facility])    Discharge Diagnoses:    Problem List as of 12/15/2017  Date Reviewed: 12/14/2017          Codes Class Noted - Resolved    Severe obesity with body mass index (BMI) of 35.0 to 39.9 with comorbidity ([facility] ICD-10-CM: E66.01  ICD-9-CM: 278.01  12/14/2017 - Present        Diabetes ([facility] ICD-10-CM: E11.9  ICD-9-CM: 250.00  Unknown - Present    Overview Signed 3/13/2017  3:46 PM by KALEY Quinn     Dx 2005             Endometrial polyp ICD-10-CM: N84.0  ICD-9-CM: 621.0  Unknown - Present        Morbid obesity ([facility] ICD-10-CM: E66.01  ICD-9-CM: 278.01  Unknown - Present        Morbid obesity with body mass index of 40.0-49.9 ([facility] ICD-10-CM: E66.01  ICD-9-CM: 278.01  Unknown - Present        Hypertension ICD-10-CM: I10  ICD-9-CM: 401.9  Unknown - Present        Irregular menses ICD-10-CM: N92.6  ICD-9-CM: 626.4  Unknown - Present        Arthritic-like pain ICD-10-CM: M25.50  ICD-9-CM: 719.40  Unknown - Present    Overview Signed 3/13/2017  3:47 PM by KALEY Quinn     knees and elbows             Chronic back pain ICD-10-CM: M54.9, G89.29  ICD-9-CM: 724.5, 338.29  Unknown - Present    Overview Signed 3/13/2017  3:52 PM by KALEY Quinn     has required steroid injections             PCOS (polycystic ovarian syndrome) ICD-10-CM: E28.2  ICD-9-CM: 256.4  12/3/2014 - Present        RESOLVED: HTN (hypertension) ICD-10-CM: I10  ICD-9-CM: 401.9  12/3/2014 - 12/3/2014              Discharge Condition: Good    Hospital Course: The patient underwent  laparoscopic sleeve gastrectomy  on 12/14/2017.  The patient tolerated the procedure well. Vital signs remained stable and the patient was transferred to  3rd floor surgical unit without complications. The patient remained stable throughout the first night post operatively with stable vital signs and adequate urine output and pain control. Pain was controlled with Dilaudid IV and IV Tylenol . The patient on the first morning post operative was transferred to the radiology suite where they underwent a gastrograffin UGI study which showed no evidence of a leak or stricture. The drain was discontinued on POD # 1 and the patient was started on a bariatric liquid diet with protein shakes. The patient progressed throughout the day and was ambulating well and tolerating their diet. They were therefore discharged home with instructions to notify me with any issues that may arise. Significant Diagnostic Studies:   No results for input(s): HGB, HGBEXT in the last 72 hours. No results for input(s): HCT, HCTEXT in the last 72 hours. Current Discharge Medication List      CONTINUE these medications which have NOT CHANGED    Details   HYDROmorphone (DILAUDID) 2 mg tablet Take 1 Tab by mouth every four (4) hours as needed for Pain. Max Daily Amount: 12 mg.  Qty: 30 Tab, Refills: 0      Omeprazole delayed release (PRILOSEC D/R) 20 mg tablet Take 1 Tab by mouth daily. OTC  Qty: 30 Tab, Refills: 1      multivitamin (ONE A DAY) tablet Take 1 Tab by mouth daily. citalopram (CELEXA) 20 mg tablet Take 20 mg by mouth daily.  Indications: ANXIETY WITH DEPRESSION         STOP taking these medications       ASPIRIN/SALICYLAMIDE/CAFFEINE (BC HEADACHE POWDER PO) Comments:   Reason for Stopping:         ergocalciferol (VITAMIN D2) 50,000 unit capsule Comments:   Reason for Stopping:         glipiZIDE (GLUCOTROL) 10 mg tablet Comments:   Reason for Stopping:         metFORMIN (GLUCOPHAGE) 1,000 mg tablet Comments:   Reason for Stopping:         lisinopril-hydrochlorothiazide (PRINZIDE, ZESTORETIC) 10-12.5 mg per tablet Comments:   Reason for Stopping:               Activity: activity as tolerated with no heavy lifting of greater than 20 pounds. No anti- inflammatory medications. Use stool softeners at home as needed while taking pain medications since they are constipating. Diet: Bariatric liquid diet    Wound Care: Keep wound clean and dry, Reinforce dressing PRN and ice to area for comfort. Do not get wound wet for 2 days.     Follow-up: 14 days with Dr Gayle Riddle M.D

## 2017-12-15 NOTE — ROUTINE PROCESS
Bedside and Verbal shift change report given to CELIA Horowitz RN (oncoming nurse) by Davide Denson (offgoing nurse). Report included the following information SBAR, Kardex, Intake/Output and MAR.

## 2017-12-15 NOTE — PROGRESS NOTES
Bariatric Surgery                POD #1    Visit Vitals    /77 (BP 1 Location: Right arm, BP Patient Position: At rest)    Pulse 61    Temp 98.2 °F (36.8 °C)    Resp 16    Ht 5' 10\" (1.778 m)    Wt 131.8 kg (290 lb 9.6 oz)    LMP 08/01/2017    SpO2 100%    Breastfeeding No    BMI 41.7 kg/m2     Patient has minimal complaints of pain, minimal nausea noted     Exam:  Appears well in no distress  Lungs- clear bilaterally  Abd - soft, incisions look good without erythema           MINERVA with minimal serosanguinous output  Extremities- no new edema or swelling    UGI - pending    Data Review:    Labs: Results:       Chemistry No results for input(s): GLU, NA, K, CL, CO2, BUN, CREA, CA, AGAP, BUCR, TBIL, GPT, AP, TP, ALB, GLOB, AGRAT in the last 72 hours. CBC w/Diff No results for input(s): WBC, RBC, HGB, HCT, PLT, GRANS, LYMPH, EOS, RETIC, HGBEXT, HCTEXT, PLTEXT in the last 72 hours. Coagulation No results for input(s): PTP, INR, APTT in the last 72 hours. No lab exists for component: INREXT    Liver Enzymes No results for input(s): TP, ALB, TBIL, AP, SGOT, GPT in the last 72 hours. No lab exists for component: DBIL       Assessment/Plan: S/P  laparoscopic sleeve gastrectomy - doing well without any issues    Orders are pending until UGI study shows normal anatomy. 1.Start bariatric diet and protein shakes  2. D/C IV pain meds and start PO pain meds  3. D/C MINERVA drain  4. Likely PM D/C if continues to be okay and tolerates PO

## 2017-12-15 NOTE — PROGRESS NOTES
Bariatric Surgery                POD #1    Visit Vitals    /73 (BP 1 Location: Right arm, BP Patient Position: At rest)    Pulse 67    Temp 97.8 °F (36.6 °C)    Resp 16    Ht 5' 10\" (1.778 m)    Wt 131.8 kg (290 lb 9.6 oz)    LMP 08/01/2017    SpO2 97%    Breastfeeding No    BMI 41.7 kg/m2     Patient has minimal complaints of pain, minimal nausea noted     Exam:  Appears well in no distress  Lungs- clear bilaterally  Abd - soft, incisions look good without erythema           MINERVA with minimal serosanguinous output  Extremities- no new edema or swelling    UGI - performed, not yet read    Data Review:    Labs: Results:       Chemistry No results for input(s): GLU, NA, K, CL, CO2, BUN, CREA, CA, AGAP, BUCR, TBIL, GPT, AP, TP, ALB, GLOB, AGRAT in the last 72 hours. CBC w/Diff No results for input(s): WBC, RBC, HGB, HCT, PLT, GRANS, LYMPH, EOS, RETIC, HGBEXT, HCTEXT, PLTEXT in the last 72 hours. Coagulation No results for input(s): PTP, INR, APTT in the last 72 hours. No lab exists for component: INREXT    Liver Enzymes No results for input(s): TP, ALB, TBIL, AP, SGOT, GPT in the last 72 hours. No lab exists for component: DBIL       Assessment/Plan: S/P  laparoscopic sleeve gastrectomy - doing well without any issues    Following orders after UGI confirmed normal -     1. Start bariatric diet and protein shakes  2. D/C IV pain meds and start PO pain meds  3. D/C MINERVA drain  4. Likley PM D/C if  Cont ok and tolerate PO

## 2017-12-15 NOTE — ROUTINE PROCESS
Bedside and Verbal shift change report given to Timurgen 09, 5468 Annalisa Martino (oncoming nurse) by Akua Hamilton RN (offgoing nurse). Report included the following information SBAR, OR Summary, Intake/Output, MAR, Accordion and Recent Results.

## 2017-12-15 NOTE — DISCHARGE INSTRUCTIONS
[facility]  Catrachito Metzger. Alcon Herndon M.D., .A.C.S.  [address] [place] [address]., [address], [address] [facility]  Office: [phone]    Fax:  [phone]    Discharge Instruction for Sleeve Gastrectomy Patients    Diet:   Continue with the liquid diet until you are seen in the office. Make sure you sip fluids all day. Goal for total fluid intake is a minimum of 64 ounces per day.  Aim for  Grams of protein every day. Activity:   Up walking at least once an hour during normal waking hours.  Walk a minimum of 30 minutes every day for exercise. Rest when you are tired.  You may shower. No baths, hot tubs or swimming.  You may climb stairs. Take your time.  No lifting more than 10-15 lbs.  If you feel discomfort during an activity, rest.   Do not drive for 1 week or until you are off of all narcotics. Wound Care:   Clean incisions with soap and water when in the shower. Pat dry.  Leave steri-strips on until they fall off.  A small amount of drainage may be present from the incisions. Contact the office if you notice an increase in drainage, an odor, increased redness, or fever > 100.5. Medications:   You should have received a prescription for pain medication and Prilosec prior to surgery, if not, notify Dr. Moni Foss team.  [place] [employer] For upset stomach you may take over the counter medications such as Maalox, Mylanta, or Pepto Bismol.  Gas X works very well for bloating when eating or drinking.  You may take Tylenol, do not exceed 4,000mg in one day. Percocet has Tylenol in it, you will need to consider this when taking Tylenol and Percocet together.  Non-aspirin based arthritis medications may be resumed.  Take a childrens or adult chewable multivitamin.  Milk of Magnesia will help with constipation.  It is fine to take your usual home medications. Blood pressure medications should be continued after surgery, unless it is a diuretic.   Diabetic medications can frequently be reduced very quickly after surgery. Diabetics should continue to monitor blood sugars frequently after surgery and contact the prescribing physician for any questions. Follow-Up Appointment:   If you do not already have a follow-up appointment scheduled, contact the office in the next few days to obtain one. It is usually scheduled for 10-14 days after your surgery date. Office phone number:  [phone].  Call our office if you have questions, concerns or any worsening of condition. If you are not able to reach us, go to your primary care provider or the Emergency Department.

## 2017-12-15 NOTE — DIABETES MGMT
Diabetes Patient/Family Education Record    Factors That  May Influence Patients Ability  to Learn or  Comply with Recommendations   []   Language barrier    []   Cultural needs   []   Motivation    []   Cognitive limitation    []   Physical   []   Education    []   Physiological factors   []   Hearing/vision/speaking impairment   []   Oriental orthodox beliefs    []   Financial factors   []  Other:   [x]  No factors identified at this time.      Person Instructed:   [x]   Patient   []   Family   []  Other     Preference for Learning:   [x]   Verbal   []   Written   []  Demonstration     Level of Comprehension & Competence:    [x]  Good                                      [] Fair                                     []  Poor                             []  Needs Reinforcement   []  Teachback completed    Education Component:   [x]  Medication management, including confirmation of home regimen; encouraged pt to follow Bariatric discharge instructions and follow up with OP PCM for DM management    [x]  Nutritional management; followed by OP Bariatric RD   []  Exercise   []  Signs, symptoms, and treatment of hyperglycemia and hypoglycemia   [] Prevention, recognition and treatment of hyperglycemia and hypoglycemia   [x]  Importance of blood glucose monitoring; encouraged pt to continue SMBG as diet changes and fluctuates; if > 200 mg/dL will need to follow up with OP PCM    []  Instruction on use of the blood glucose meter   [x]  Discuss the importance of HbA1C monitoring    []  Sick day guidelines   []  Proper use and disposal of lancets, needles, syringes or insulin pens (if appropriate)   []  Potential long-term complications (retinopathy, kidney disease, neuropathy, foot care)   [] Information about whom to contact in case of emergency or for more information    [x]  Goal:  Patient/family will demonstrate understanding of Diabetes Self Management Skills by: 1/15/18  Plan for post-discharge education or self-management support:    [] Outpatient class schedule provided            [] Patient Declined    [] Scheduled for outpatient classes (date) _______         Elaine Shaw RN, MS  Glycemic Control Team

## 2017-12-20 DIAGNOSIS — G89.18 POST-OPERATIVE PAIN: Primary | ICD-10-CM

## 2017-12-20 RX ORDER — TRAMADOL HYDROCHLORIDE 50 MG/1
50 TABLET ORAL
Qty: 10 TAB | Refills: 0 | Status: SHIPPED | OUTPATIENT
Start: 2017-12-20 | End: 2018-01-11 | Stop reason: SDUPTHER

## 2017-12-28 ENCOUNTER — OFFICE VISIT (OUTPATIENT)
Dept: SURGERY | Age: 32
End: 2017-12-28

## 2017-12-28 VITALS
OXYGEN SATURATION: 97 % | HEART RATE: 95 BPM | DIASTOLIC BLOOD PRESSURE: 65 MMHG | BODY MASS INDEX: 36.58 KG/M2 | SYSTOLIC BLOOD PRESSURE: 109 MMHG | WEIGHT: 255.5 LBS | HEIGHT: 70 IN

## 2017-12-28 DIAGNOSIS — I10 ESSENTIAL HYPERTENSION: ICD-10-CM

## 2017-12-28 DIAGNOSIS — Z98.84 S/P LAPAROSCOPIC SLEEVE GASTRECTOMY: ICD-10-CM

## 2017-12-28 DIAGNOSIS — E11.9 TYPE 2 DIABETES MELLITUS WITHOUT COMPLICATION, WITHOUT LONG-TERM CURRENT USE OF INSULIN (HCC): ICD-10-CM

## 2017-12-28 DIAGNOSIS — K90.9 INTESTINAL MALABSORPTION, UNSPECIFIED TYPE: Primary | ICD-10-CM

## 2017-12-28 NOTE — PATIENT INSTRUCTIONS
Continue focus on hydration. May advance to soft diet. Please review material in your binder. Remember - your motto is \"soft foods with protein\". Eat regularly. Continue to use protein shakes. Remember to eat slowly & not to drink fluids with your meals. Increase activity as able - cardio (walking) only. Continue to take multi-vitamins. Continue regular follow-up with your PCP. Return to office in 2 weeks for your next appointment. Call the office if you have any questions or concerns. Walking for Exercise: Care Instructions  Your Care Instructions    Walking is one of the easiest ways to get the exercise you need for good health. A brisk, 30-minute walk each day can help you feel better and have more energy. It can help you lower your risk of disease. Walking can help you keep your bones strong and your heart healthy. Check with your doctor before you start a walking plan if you have heart problems, other health issues, or you have not been active in a long time. Follow your doctor's instructions for safe levels of exercise. Follow-up care is a key part of your treatment and safety. Be sure to make and go to all appointments, and call your doctor if you are having problems. It's also a good idea to know your test results and keep a list of the medicines you take. How can you care for yourself at home? Getting started  · Start slowly and set a short-term goal. For example, walk for 5 or 10 minutes every day. · Bit by bit, increase the amount you walk every day. Try for at least 30 minutes on most days of the week. You also may want to swim, bike, or do other activities. · If finding enough time is a problem, it is fine to be active in blocks of 10 minutes or more throughout your day and week. · To get the heart-healthy benefits of walking, you need to walk briskly enough to increase your heart rate and breathing, but not so fast that you cannot talk comfortably.   · Wear comfortable shoes that fit well and provide good support for your feet and ankles. Staying with your plan  · After you've made walking a habit, set a longer-term goal. You may want to set a goal of walking briskly for longer or walking farther. Experts say to do 2½ hours of moderate activity a week. A faster heartbeat is what defines moderate-level activity. · To stay motivated, walk with friends, coworkers, or pets. · Use a phone mackenzie or pedometer to track your steps each day. Set a goal to increase your steps. Once you get there, set a higher goal. Aim for 10,000 steps a day. · If the weather keeps you from walking outside, go for walks at the mall with a friend. Local schools and churches may have indoor gyms where you can walk. Fitting a walk into your workday  · Park several blocks away from work, or get off the bus a few stops early. · Use the stairs instead of the elevator, at least for a few floors. · Suggest holding meetings with colleagues during a walk inside or outside the building. · Use the restroom that is the farthest from your desk or workstation. · Use your morning and afternoon breaks to take quick 15-minute walks. Staying safe  · Know your surroundings. Walk in a well-lighted, safe place. If it is dark, walk with a partner. Wear light-colored clothing. If you can, buy a vest or jacket that reflects light. · Carry a cell phone for emergencies. · Drink plenty of water. Take a water bottle with you when you walk. This is very important if it is hot out. · Be careful not to slip on wet or icy ground. You can buy \"grippers\" for your shoes to help keep you from slipping. · Pay attention to your walking surface. Use sidewalks and paths. · If you have breathing problems like asthma or COPD, ask your doctor when it is safe for you to walk outdoors. Cold, dry air, smog, pollen, or other things in the air could cause breathing problems. Where can you learn more?   Go to http://león-irving.info/. Enter R159 in the search box to learn more about \"Walking for Exercise: Care Instructions. \"  Current as of: March 13, 2017  Content Version: 11.4  © 2257-3764 Healthwise, [employer]. Care instructions adapted under license by [employer] (which disclaims liability or warranty for this information). If you have questions about a medical condition or this instruction, always ask your healthcare professional. [place] [address] any warranty or liability for your use of this information.

## 2018-01-11 ENCOUNTER — OFFICE VISIT (OUTPATIENT)
Dept: SURGERY | Age: 33
End: 2018-01-11

## 2018-01-11 VITALS — WEIGHT: 250 LBS | BODY MASS INDEX: 35.87 KG/M2

## 2018-01-11 DIAGNOSIS — Z98.84 S/P LAPAROSCOPIC SLEEVE GASTRECTOMY: ICD-10-CM

## 2018-01-11 DIAGNOSIS — K90.9 INTESTINAL MALABSORPTION, UNSPECIFIED TYPE: Primary | ICD-10-CM

## 2018-01-11 DIAGNOSIS — G89.18 POST-OPERATIVE PAIN: ICD-10-CM

## 2018-01-11 DIAGNOSIS — E66.01 MORBID OBESITY WITH BODY MASS INDEX OF 40.0-49.9 (HCC): Primary | ICD-10-CM

## 2018-01-11 DIAGNOSIS — R10.32 LLQ ABDOMINAL PAIN: ICD-10-CM

## 2018-01-11 RX ORDER — TRAMADOL HYDROCHLORIDE 50 MG/1
50 TABLET ORAL
Qty: 10 TAB | Refills: 0 | Status: SHIPPED | OUTPATIENT
Start: 2018-01-11 | End: 2018-01-11 | Stop reason: DRUGHIGH

## 2018-01-11 RX ORDER — TRAMADOL HYDROCHLORIDE 50 MG/1
50 TABLET ORAL
Qty: 30 TAB | Refills: 0 | Status: SHIPPED | OUTPATIENT
Start: 2018-01-11 | End: 2018-03-07

## 2018-01-11 NOTE — PROGRESS NOTES
Subjective:      Alex Arias is a 27 y.o. female is now 1 months status post laparoscopic sleeve gastrectomy. Doing well overall. She has lost a total of 26 pounds since surgery. Body mass index is 35.87 kg/(m^2). Has lost 20% of EBW. Currently on a soft food diet with difficulty, reports abdominal pain and denies vomiting and diarrhea. Taking in 48 oz water daily. Sources of protein include shakes, eggs. 10-15 min of activity 4 days a week, including walking. Bowel movements are regular. Pain is not well controlled. Medication(s) being used: tramadol. . The patient is compliant with multivitamin. Had severe LLQ pain Tuesday went bent over. Has been constant nearly since. Heating pad does help, but it concerned about her job and having to sit all day will make pain worse.    Weight Loss Metrics 1/11/2018 12/28/2017 12/15/2017 12/14/2017 12/4/2017 12/1/2017 11/13/2017   Today's Wt 250 lb 255 lb 8 oz 290 lb 9.6 oz - 276 lb 270 lb 270 lb   BMI 35.87 kg/m2 36.66 kg/m2 - 41.7 kg/m2 39.6 kg/m2 38.74 kg/m2 38.74 kg/m2          Comorbidities:    Hypertension: not applicable  Diabetes: improved, off medications  Obstructive Sleep Apnea: not applicable  Hyperlipidemia: not applicable  Stress Urinary Incontinence: not applicable  Gastroesophageal Reflux: not applicable  Weight related arthropathy:unchanged     Patient Active Problem List   Diagnosis Code    PCOS (polycystic ovarian syndrome) E28.2    Diabetes ([facility] E11.9    Endometrial polyp N84.0    Morbid obesity ([facility] E66.01    Morbid obesity with body mass index of 40.0-49.9 ([facility]) E66.01    Hypertension I10    Irregular menses N92.6    Arthritic-like pain M25.50    Chronic back pain M54.9, G89.29    Severe obesity with body mass index (BMI) of 35.0 to 39.9 with comorbidity ([facility]) E66.01        Past Medical History:   Diagnosis Date    Arthritic-like pain     knees and elbows    Chronic back pain     has required steroid injections    Diabetes [facility])     Dx 2005    Endometrial polyp     Irregular menses     Morbid obesity ([address]     Morbid obesity with body mass index of 40.0-49.9 ([facility])     PCOS (polycystic ovarian syndrome)     Endometriosis    Psychiatric disorder     depression       Past Surgical History:   Procedure Laterality Date    HX GYN  03/31/2017    D&C       Current Outpatient Prescriptions   Medication Sig Dispense Refill    traMADol (ULTRAM) 50 mg tablet Take 1 Tab by mouth every six (6) hours as needed for Pain. Max Daily Amount: 200 mg. 10 Tab 0    multivitamin (ONE A DAY) tablet Take 1 Tab by mouth daily.  citalopram (CELEXA) 20 mg tablet Take 20 mg by mouth daily.  Indications: ANXIETY WITH DEPRESSION         Allergies   Allergen Reactions    Percocet [Oxycodone-Acetaminophen] Nausea and Vomiting       Review of Symptoms:       General - No history or complaints of unexpected fever or chills  Head/Neck - No history or complaints of headache or dizziness  Cardiac - No history or complaints of chest pain, palpitations, or shortness of breath  Pulmonary - No history or complaints of shortness of breath or productive cough  Gastrointestinal - as noted above  Genitourinary - No history or complaints of hematuria/dysuria or renal lithiasis  Musculoskeletal - No history or complaints of joint  muscular weakness  Hematologic - No history of any bleeding episodes  Neurologic - No history or complaints of  migraine headaches or neurologic symptoms        Objective:     Visit Vitals    Wt 113.4 kg (250 lb)    BMI 35.87 kg/m2       General:  alert, cooperative, no distress, appears stated age   Chest: lungs clear to auscultation, breath sounds equal and symmetric, no rhonchi, rales or wheezes, no accessory muscle use   Cor:   Regular rate and rhythm, S1S2 present or without murmur or extra heart sounds   Abdomen: soft, bowel sounds active, non-tender, no masses or organomegaly   Incisions:   healing well, no drainage, no erythema, no hernia, no seroma, no swelling, no dehiscence, incision well approximated       Assessment:   History of Morbid obesity, status post laparoscopic sleeve gastrectomy. Overall, doing well postoperatively. Postoperative course complicated by persistent LLQ positional pain    Plan:     1. Will extend return to work 1 week due to continued postoperative pain. Continue heating pad and compression. Will refill tramadol 1 more time, but then will need to be further managed by PCP. 2. Can stop probiotic    3. Advance diet to solid phase. Reminded to measure portions, continue high protein, low carbohydrate diet. Reminded to eat regularly, to eat slowly & not to drink with meals. Refer to the handbook given in class. 4. Patient has appt with dietician directly after this visit. 5. Continue multivitamin and add the additional vitamin supplementation (Ca, B complex, B12, D, all listed in handbook)  6. Continue current medications and follow up with PCP for management of regimen. 7. Continue cardio exercise and add resistance exercises. Discussed with patient. Minimum of 30 minutes of exercise daily. 8. Encouraged to attend support group   9. I have discussed this plan with patient and they verbalized understanding  10. Follow up in 1 months or sooner if patient has questions, concerns or worsening of condition, if unable to reach our office, patient should report to the ED. 6. Ms. Millie Holbrook has a reminder for a \"due or due soon\" health maintenance. I have asked that she contact her primary care provider for a follow-up on this health maintenance.

## 2018-01-11 NOTE — PATIENT INSTRUCTIONS
May advance diet to solid phase. Remember to measure portions, to keep the focus on increasing your protein & keeping your carbs low. Continue the use of protein shakes. To follow recommendations of dietician. Stay hydrated! Eat regularly, eat slowly & do not drink with your meals. Vitamins to be taken include your multivitamin, B12, calcium citrate, Vit D & Bcomplex. Refer to your notebook & handouts for dosages. Continue to increase cardio activity. May add resistance exercises. Continue follow-up with your PCP. Return to this office in 1 month. Call if questions/concerns prior to your office visit. Walking for Exercise: Care Instructions  Your Care Instructions    Walking is one of the easiest ways to get the exercise you need for good health. A brisk, 30-minute walk each day can help you feel better and have more energy. It can help you lower your risk of disease. Walking can help you keep your bones strong and your heart healthy. Check with your doctor before you start a walking plan if you have heart problems, other health issues, or you have not been active in a long time. Follow your doctor's instructions for safe levels of exercise. Follow-up care is a key part of your treatment and safety. Be sure to make and go to all appointments, and call your doctor if you are having problems. It's also a good idea to know your test results and keep a list of the medicines you take. How can you care for yourself at home? Getting started  · Start slowly and set a short-term goal. For example, walk for 5 or 10 minutes every day. · Bit by bit, increase the amount you walk every day. Try for at least 30 minutes on most days of the week. You also may want to swim, bike, or do other activities. · If finding enough time is a problem, it is fine to be active in blocks of 10 minutes or more throughout your day and week.   · To get the heart-healthy benefits of walking, you need to walk briskly enough to increase your heart rate and breathing, but not so fast that you cannot talk comfortably. · Wear comfortable shoes that fit well and provide good support for your feet and ankles. Staying with your plan  · After you've made walking a habit, set a longer-term goal. You may want to set a goal of walking briskly for longer or walking farther. Experts say to do 2½ hours of moderate activity a week. A faster heartbeat is what defines moderate-level activity. · To stay motivated, walk with friends, coworkers, or pets. · Use a phone mackenzie or pedometer to track your steps each day. Set a goal to increase your steps. Once you get there, set a higher goal. Aim for 10,000 steps a day. · If the weather keeps you from walking outside, go for walks at the mall with a friend. Local schools and churches may have indoor gyms where you can walk. Fitting a walk into your workday  · Park several blocks away from work, or get off the bus a few stops early. · Use the stairs instead of the elevator, at least for a few floors. · Suggest holding meetings with colleagues during a walk inside or outside the building. · Use the restroom that is the farthest from your desk or workstation. · Use your morning and afternoon breaks to take quick 15-minute walks. Staying safe  · Know your surroundings. Walk in a well-lighted, safe place. If it is dark, walk with a partner. Wear light-colored clothing. If you can, buy a vest or jacket that reflects light. · Carry a cell phone for emergencies. · Drink plenty of water. Take a water bottle with you when you walk. This is very important if it is hot out. · Be careful not to slip on wet or icy ground. You can buy \"grippers\" for your shoes to help keep you from slipping. · Pay attention to your walking surface. Use sidewalks and paths. · If you have breathing problems like asthma or COPD, ask your doctor when it is safe for you to walk outdoors.  Cold, dry air, smog, pollen, or other things in the air could cause breathing problems. Where can you learn more? Go to http://león-irving.info/. Enter R159 in the search box to learn more about \"Walking for Exercise: Care Instructions. \"  Current as of: March 13, 2017  Content Version: 11.4  © 2229-7464 [employer] [place] [employer]. Care instructions adapted under license by [employer] (which disclaims liability or warranty for this information). If you have questions about a medical condition or this instruction, always ask your healthcare professional. [address] any warranty or liability for your use of this information.

## 2018-01-11 NOTE — PROGRESS NOTES
Pt given one on one diet education. Appears to have a good understanding of the diet progression, food choices, and dietary/exercise habits for successful weight loss and nourishment one month after surgery. The  material included: post-op diet progression and portion sizes (including low fat, low sugar food recommendations and emphasis on protein foods and protein supplements), good beverage choices, reading a food label, vitamins/minerals required after weight loss surgery, and encouraging dietary and exercise habits that lead to weight loss success. Pt also received a restaurant card, which tells restaurants that the patient had a procedure that decreases the size of their stomach so the restaurant may let them order off the children's menu, the senior's menu, or a smaller portion for a reduced rate.      Edith Salgado RD

## 2018-01-11 NOTE — MR AVS SNAPSHOT
Visit Information Date & Time Provider Department Dept. Phone Encounter #  
 1/11/2018  1:00 PM Julio Farr NP [facility] [phone] Follow-up Instructions Return in about 1 month (around 2/11/2018). Upcoming Health Maintenance Date Due  
 FOOT EXAM Q1 3/21/1993 MICROALBUMIN Q1 3/21/1993 EYE EXAM RETINAL OR DILATED Q1 3/21/1993 Pneumococcal 19-64 Medium Risk (1 of 1 - PPSV23) 3/21/2002 LIPID PANEL Q1 12/3/2015 Influenza Age 5 to Adult 8/1/2017 HEMOGLOBIN A1C Q6M 6/4/2018 PAP AKA CERVICAL CYTOLOGY 3/15/2020 DTaP/Tdap/Td series (2 - Td) 12/14/2023 Allergies as of 1/11/2018  Review Complete On: 1/11/2018 By: Julio Farr NP Severity Noted Reaction Type Reactions Percocet [Oxycodone-acetaminophen]  12/04/2017    Nausea and Vomiting Current Immunizations  Reviewed on 12/14/2017 Name Date TDAP Vaccine 11/7/2012 11:37 AM  
 Tdap 12/14/2013  8:07 PM  
  
 Not reviewed this visit You Were Diagnosed With   
  
 Codes Comments Intestinal malabsorption, unspecified type    -  Primary ICD-10-CM: K90.9 ICD-9-CM: 579.9 S/P laparoscopic sleeve gastrectomy     ICD-10-CM: C42.89 ICD-9-CM: V45.86 Post-operative pain     ICD-10-CM: G89.18 
ICD-9-CM: 338.18   
 LLQ abdominal pain     ICD-10-CM: R10.32 
ICD-9-CM: 789.04 Vitals Weight(growth percentile) BMI OB Status Smoking Status 250 lb (113.4 kg) 35.87 kg/m2 Polycystic Ovarian Syndrome Never Smoker BMI and BSA Data Body Mass Index Body Surface Area  
 35.87 kg/m 2 2.37 m 2 Preferred Pharmacy Pharmacy Name Phone [address] [place] [address]. [phone] Your Updated Medication List  
  
   
This list is accurate as of: 1/11/18  2:22 PM.  Always use your most recent med list.  
  
  
  
  
 CeleXA 20 mg tablet Generic drug:  citalopram  
 Take 20 mg by mouth daily. Indications: ANXIETY WITH DEPRESSION  
  
 multivitamin tablet Commonly known as:  ONE A DAY Take 1 Tab by mouth daily. traMADol 50 mg tablet Commonly known as:  ULTRAM  
Take 1 Tab by mouth every six (6) hours as needed for Pain. Max Daily Amount: 200 mg. Prescriptions Printed Refills  
 traMADol (ULTRAM) 50 mg tablet 0 Sig: Take 1 Tab by mouth every six (6) hours as needed for Pain. Max Daily Amount: 200 mg. Class: Print Route: Oral  
  
Follow-up Instructions Return in about 1 month (around 2/11/2018). Patient Instructions May advance diet to solid phase. Remember to measure portions, to keep the focus on increasing your protein & keeping your carbs low. Continue the use of protein shakes. To follow recommendations of dietician. Stay hydrated! Eat regularly, eat slowly & do not drink with your meals. Vitamins to be taken include your multivitamin, B12, calcium citrate, Vit D & Bcomplex. Refer to your notebook & handouts for dosages. Continue to increase cardio activity. May add resistance exercises. Continue follow-up with your PCP. Return to this office in 1 month. Call if questions/concerns prior to your office visit. Walking for Exercise: Care Instructions Your Care Instructions Walking is one of the easiest ways to get the exercise you need for good health. A brisk, 30-minute walk each day can help you feel better and have more energy. It can help you lower your risk of disease. Walking can help you keep your bones strong and your heart healthy. Check with your doctor before you start a walking plan if you have heart problems, other health issues, or you have not been active in a long time. Follow your doctor's instructions for safe levels of exercise. Follow-up care is a key part of your treatment and safety.  Be sure to make and go to all appointments, and call your doctor if you are having problems. It's also a good idea to know your test results and keep a list of the medicines you take. How can you care for yourself at home? Getting started · Start slowly and set a short-term goal. For example, walk for 5 or 10 minutes every day. · Bit by bit, increase the amount you walk every day. Try for at least 30 minutes on most days of the week. You also may want to swim, bike, or do other activities. · If finding enough time is a problem, it is fine to be active in blocks of 10 minutes or more throughout your day and week. · To get the heart-healthy benefits of walking, you need to walk briskly enough to increase your heart rate and breathing, but not so fast that you cannot talk comfortably. · Wear comfortable shoes that fit well and provide good support for your feet and ankles. Staying with your plan · After you've made walking a habit, set a longer-term goal. You may want to set a goal of walking briskly for longer or walking farther. Experts say to do 2½ hours of moderate activity a week. A faster heartbeat is what defines moderate-level activity. · To stay motivated, walk with friends, coworkers, or pets. · Use a phone mackenzie or pedometer to track your steps each day. Set a goal to increase your steps. Once you get there, set a higher goal. Aim for 10,000 steps a day. · If the weather keeps you from walking outside, go for walks at the mall with a friend. Local schools and churches may have indoor gyms where you can walk. Fitting a walk into your workday · Park several blocks away from work, or get off the bus a few stops early. · Use the stairs instead of the elevator, at least for a few floors. · Suggest holding meetings with colleagues during a walk inside or outside the building. · Use the restroom that is the farthest from your desk or workstation. · Use your morning and afternoon breaks to take quick 15-minute walks. Staying safe · Know your surroundings. Walk in a well-lighted, safe place. If it is dark, walk with a partner. Wear light-colored clothing. If you can, buy a vest or jacket that reflects light. · Carry a cell phone for emergencies. · Drink plenty of water. Take a water bottle with you when you walk. This is very important if it is hot out. · Be careful not to slip on wet or icy ground. You can buy \"grippers\" for your shoes to help keep you from slipping. · Pay attention to your walking surface. Use sidewalks and paths. · If you have breathing problems like asthma or COPD, ask your doctor when it is safe for you to walk outdoors. Cold, dry air, smog, pollen, or other things in the air could cause breathing problems. Where can you learn more? Go to http://león-irving.info/. Enter R159 in the search box to learn more about \"Walking for Exercise: Care Instructions. \" Current as of: March 13, 2017 Content Version: 11.4 © 2561-0936 [employer]. Care instructions adapted under license by [employer] (which disclaims liability or warranty for this information). If you have questions about a medical condition or this instruction, always ask your healthcare professional. [address] any warranty or liability for your use of this information. Introducing [facility]! Dear Yenifer Hirsch: Thank you for requesting a [employer] account. Our records indicate that you already have an active [employer] account. You can access your account anytime at [email or web address] Did you know that you can access your hospital and ER discharge instructions at any time in [employer]? You can also review all of your test results from your hospital stay or ER visit. Additional Information If you have questions, please visit the Frequently Asked Questions section of the [employer] website at [email or web address] Remember, MyChart is NOT to be used for urgent needs. For medical emergencies, dial 911. Now available from your iPhone and Android! Please provide this summary of care documentation to your next provider. Your primary care clinician is listed as Jann Hummel. If you have any questions after today's visit, please call [phone].

## 2018-02-02 ENCOUNTER — HOSPITAL ENCOUNTER (EMERGENCY)
Age: 33
Discharge: HOME | End: 2018-02-02
Attending: EMERGENCY MEDICINE
Payer: COMMERCIAL

## 2018-02-02 VITALS
BODY MASS INDEX: 34.79 KG/M2 | DIASTOLIC BLOOD PRESSURE: 89 MMHG | TEMPERATURE: 97.7 F | OXYGEN SATURATION: 99 % | WEIGHT: 243 LBS | HEART RATE: 69 BPM | RESPIRATION RATE: 18 BRPM | SYSTOLIC BLOOD PRESSURE: 139 MMHG | HEIGHT: 70 IN

## 2018-02-02 DIAGNOSIS — N30.01 ACUTE CYSTITIS WITH HEMATURIA: Primary | ICD-10-CM

## 2018-02-02 LAB
APPEARANCE UR: ABNORMAL
BACTERIA URNS QL MICRO: ABNORMAL /HPF
BILIRUB UR QL: NEGATIVE
COLOR UR: YELLOW
EPITH CASTS URNS QL MICRO: ABNORMAL /LPF (ref 0–5)
GLUCOSE UR STRIP.AUTO-MCNC: NEGATIVE MG/DL
HCG UR QL: NEGATIVE
HGB UR QL STRIP: ABNORMAL
KETONES UR QL STRIP.AUTO: >160 MG/DL
LEUKOCYTE ESTERASE UR QL STRIP.AUTO: ABNORMAL
NITRITE UR QL STRIP.AUTO: NEGATIVE
PH UR STRIP: 6 [PH] (ref 5–8)
PROT UR STRIP-MCNC: 100 MG/DL
RBC #/AREA URNS HPF: ABNORMAL /HPF (ref 0–5)
SP GR UR REFRACTOMETRY: 1.02 (ref 1–1.03)
UROBILINOGEN UR QL STRIP.AUTO: 1 EU/DL (ref 0.2–1)
WBC URNS QL MICRO: ABNORMAL /HPF (ref 0–4)

## 2018-02-02 PROCEDURE — 87186 SC STD MICRODIL/AGAR DIL: CPT

## 2018-02-02 PROCEDURE — 87077 CULTURE AEROBIC IDENTIFY: CPT

## 2018-02-02 PROCEDURE — 81025 URINE PREGNANCY TEST: CPT

## 2018-02-02 PROCEDURE — 99283 EMERGENCY DEPT VISIT LOW MDM: CPT

## 2018-02-02 PROCEDURE — 87086 URINE CULTURE/COLONY COUNT: CPT

## 2018-02-02 PROCEDURE — 81001 URINALYSIS AUTO W/SCOPE: CPT

## 2018-02-02 RX ORDER — PHENAZOPYRIDINE HYDROCHLORIDE 200 MG/1
200 TABLET, FILM COATED ORAL 3 TIMES DAILY
Qty: 6 TAB | Refills: 0 | Status: SHIPPED | OUTPATIENT
Start: 2018-02-02 | End: 2018-02-04

## 2018-02-02 RX ORDER — NITROFURANTOIN 25; 75 MG/1; MG/1
100 CAPSULE ORAL 2 TIMES DAILY
Qty: 14 CAP | Refills: 0 | Status: SHIPPED | OUTPATIENT
Start: 2018-02-02 | End: 2018-02-08

## 2018-02-02 NOTE — DISCHARGE INSTRUCTIONS
Urinary Tract Infection in Women: Care Instructions  Your Care Instructions    A urinary tract infection, or UTI, is a general term for an infection anywhere between the kidneys and the urethra (where urine comes out). Most UTIs are bladder infections. They often cause pain or burning when you urinate. UTIs are caused by bacteria and can be cured with antibiotics. Be sure to complete your treatment so that the infection goes away. Follow-up care is a key part of your treatment and safety. Be sure to make and go to all appointments, and call your doctor if you are having problems. It's also a good idea to know your test results and keep a list of the medicines you take. How can you care for yourself at home? · Take your antibiotics as directed. Do not stop taking them just because you feel better. You need to take the full course of antibiotics. · Drink extra water and other fluids for the next day or two. This may help wash out the bacteria that are causing the infection. (If you have kidney, heart, or liver disease and have to limit fluids, talk with your doctor before you increase your fluid intake.)  · Avoid drinks that are carbonated or have caffeine. They can irritate the bladder. · Urinate often. Try to empty your bladder each time. · To relieve pain, take a hot bath or lay a heating pad set on low over your lower belly or genital area. Never go to sleep with a heating pad in place. To prevent UTIs  · Drink plenty of water each day. This helps you urinate often, which clears bacteria from your system. (If you have kidney, heart, or liver disease and have to limit fluids, talk with your doctor before you increase your fluid intake.)  · Urinate when you need to. · Urinate right after you have sex. · Change sanitary pads often. · Avoid douches, bubble baths, feminine hygiene sprays, and other feminine hygiene products that have deodorants.   · After going to the bathroom, wipe from front to back.  When should you call for help? Call your doctor now or seek immediate medical care if:  ? · Symptoms such as fever, chills, nausea, or vomiting get worse or appear for the first time. ? · You have new pain in your back just below your rib cage. This is called flank pain. ? · There is new blood or pus in your urine. ? · You have any problems with your antibiotic medicine. ? Watch closely for changes in your health, and be sure to contact your doctor if:  ? · You are not getting better after taking an antibiotic for 2 days. ? · Your symptoms go away but then come back. Where can you learn more? Go to http://león-irving.info/. Enter V586 in the search box to learn more about \"Urinary Tract Infection in Women: Care Instructions. \"  Current as of: May 12, 2017  Content Version: 11.4  © 0053-2204 [employer]. Care instructions adapted under license by [employer] (which disclaims liability or warranty for this information). If you have questions about a medical condition or this instruction, always ask your healthcare professional. [address] any warranty or liability for your use of this information. [employer] Activation    Thank you for requesting access to [employer]. Please follow the instructions below to securely access and download your online medical record. [employer] allows you to send messages to your doctor, view your test results, renew your prescriptions, schedule appointments, and more. How Do I Sign Up? 1. In your internet browser, go to [email or web address]  2. Click on the First Time User? Click Here link in the Sign In box. You will be redirect to the New Member Sign Up page. 3. Enter your [employer] Access Code exactly as it appears below. You will not need to use this code after youve completed the sign-up process. If you do not sign up before the expiration date, you must request a new code.     [employer] Access Code: Activation code not generated  Current [employer] Status: Active (This is the date your [employer] access code will )    4. Enter the last four digits of your Social Security Number (xxxx) and Date of Birth (mm/dd/yyyy) as indicated and click Submit. You will be taken to the next sign-up page. 5. Create a Segmintt ID. This will be your [employer] login ID and cannot be changed, so think of one that is secure and easy to remember. 6. Create a [employer] password. You can change your password at any time. 7. Enter your Password Reset Question and Answer. This can be used at a later time if you forget your password. 8. Enter your e-mail address. You will receive e-mail notification when new information is available in [address] 9. Click Sign Up. You can now view and download portions of your medical record. 10. Click the Download Summary menu link to download a portable copy of your medical information. Additional Information    If you have questions, please visit the Frequently Asked Questions section of the [employer] website at [email or web address] Remember, [employer] is NOT to be used for urgent needs. For medical emergencies, dial 911.

## 2018-02-02 NOTE — ED PROVIDER NOTES
EMERGENCY DEPARTMENT HISTORY AND PHYSICAL EXAM    9:42 AM      Date: 2/2/2018  Patient Name: Leia Rai    History of Presenting Illness     Chief Complaint   Patient presents with    Urinary Pain         History Provided By: Patient    Chief Complaint: dysuria x 4 days \" I think I am getting UTI, I am having similar sx as earlier when I had it\"  Duration:  as noted above   Timing:  Gradual  Location: as noted above  Quality: Burning  Severity: 8 out of 10  Modifying Factors: NONE  Associated Symptoms: NONE       Additional History (Context): Leia Rai is a 27 y.o. female with PCOS  who presents with concerns of possibly developing UTI with sx of dysuria but denies of any fever, chills, n/v diarrhae, abd pain or diarrhea. Per patient her periods have been irregular due to PCOS. and \" I noticed I had some blood in my urine, I think my peroids might have started. \"    PCP: Jorge Erickson MD    Current Outpatient Prescriptions   Medication Sig Dispense Refill    nitrofurantoin, macrocrystal-monohydrate, (MACROBID) 100 mg capsule Take 1 Cap by mouth two (2) times a day for 7 days. Indications: BACTERIAL URINARY TRACT INFECTION 14 Cap 0    phenazopyridine (PYRIDIUM) 200 mg tablet Take 1 Tab by mouth three (3) times daily for 2 days. 6 Tab 0    traMADol (ULTRAM) 50 mg tablet Take 1 Tab by mouth every six (6) hours as needed for Pain. Max Daily Amount: 200 mg. 30 Tab 0    multivitamin (ONE A DAY) tablet Take 1 Tab by mouth daily.  citalopram (CELEXA) 20 mg tablet Take 20 mg by mouth daily.  Indications: ANXIETY WITH DEPRESSION         Past History     Past Medical History:  Past Medical History:   Diagnosis Date    Arthritic-like pain     knees and elbows    Chronic back pain     has required steroid injections    Diabetes ([address]     Dx 2005    Endometrial polyp     Irregular menses     [facility]     Morbid obesity with body mass index of 40.0-49.9 (HCC)     PCOS (polycystic ovarian syndrome)     Endometriosis    Psychiatric disorder     depression       Past Surgical History:  Past Surgical History:   Procedure Laterality Date    HX GYN  03/31/2017    D&C       Family History:  Family History   Problem Relation Age of Onset    Diabetes Maternal Aunt     Diabetes Paternal Grandmother     Diabetes Paternal Grandfather     Diabetes Mother        Social History:  Social History   Substance Use Topics    Smoking status: Never Smoker    Smokeless tobacco: Never Used    Alcohol use 0.6 oz/week     1 Shots of liquor per week       Allergies: Allergies   Allergen Reactions    Percocet [Oxycodone-Acetaminophen] Nausea and Vomiting         Review of Systems       Review of Systems   Gastrointestinal: Negative for diarrhea, nausea and vomiting. Genitourinary: Positive for dysuria. All other systems reviewed and are negative. Physical Exam     Visit Vitals    /89 (BP 1 Location: Left arm, BP Patient Position: At rest)    Pulse 69    Temp 97.7 °F (36.5 °C)    Resp 18    Ht 5' 10\" (1.778 m)    Wt 110.2 kg (243 lb)    SpO2 99%    BMI 34.87 kg/m2         Physical Exam   Constitutional: She is oriented to person, place, and time. She appears well-developed and well-nourished. HENT:   Head: Normocephalic and atraumatic. Eyes: Conjunctivae and EOM are normal. Pupils are equal, round, and reactive to light. Neck: Normal range of motion. Cardiovascular: Normal rate, regular rhythm and normal heart sounds. Pulmonary/Chest: Effort normal and breath sounds normal. No respiratory distress. She has no wheezes. She has no rales. She exhibits no tenderness. Abdominal: Soft. Bowel sounds are normal. She exhibits no distension. There is no tenderness. There is no rebound and no guarding. Musculoskeletal: Normal range of motion. Neurological: She is alert and oriented to person, place, and time. Skin: Skin is warm. She is not diaphoretic.    Psychiatric: She has a normal mood and affect. Her behavior is normal. Judgment and thought content normal.         Diagnostic Study Results     Labs -  Recent Results (from the past 12 hour(s))   URINALYSIS W/ RFLX MICROSCOPIC    Collection Time: 02/02/18  9:30 AM   Result Value Ref Range    Color YELLOW      Appearance TURBID      Specific gravity 1.021 1.005 - 1.030      pH (UA) 6.0 5.0 - 8.0      Protein 100 (A) NEG mg/dL    Glucose NEGATIVE  NEG mg/dL    Ketone >160 (A) NEG mg/dL    Bilirubin NEGATIVE  NEG      Blood LARGE (A) NEG      Urobilinogen 1.0 0.2 - 1.0 EU/dL    Nitrites NEGATIVE  NEG      Leukocyte Esterase LARGE (A) NEG     HCG URINE, QL    Collection Time: 02/02/18  9:30 AM   Result Value Ref Range    HCG urine, QL NEGATIVE  NEG       Radiologic Studies -   No orders to display         Medical Decision Making   I am the first provider for this patient. I reviewed the vital signs, available nursing notes, past medical history, past surgical history, family history and social history. Vital Signs-Reviewed the patient's vital signs. Records Reviewed: Nursing Notes and Old Medical Records (Time of Review: 9:42 AM)      Provider Notes (Medical Decision Making): Will place on macrobid and pyridium. Diagnosis     Clinical Impression:   1. Acute cystitis with hematuria        Disposition: ME    Follow-up Information     Follow up With Details Comments [address], [place] In 1 day  [address] [address] [address] [facility]      [facility] EM[facility]GENCY DEPT  As needed [address] include 234 beds at the [facility]  [phone]           Patient's Medications   Start Taking    NITROFURANTOIN, MACROCRYSTAL-MONOHYDRATE, (MACROBID) 100 MG CAPSULE    Take 1 Cap by mouth two (2) times a day for 7 days. Indications: BACTERIAL URINARY TRACT INFECTION    PHENAZOPYRIDINE (PYRIDIUM) 200 MG TABLET    Take 1 Tab by mouth three (3) times daily for 2 days.    Continue Taking    CITALOPRAM (CELEXA) 20 MG TABLET    Take 20 mg by mouth daily. Indications: ANXIETY WITH DEPRESSION    MULTIVITAMIN (ONE A DAY) TABLET    Take 1 Tab by mouth daily. TRAMADOL (ULTRAM) 50 MG TABLET    Take 1 Tab by mouth every six (6) hours as needed for Pain. Max Daily Amount: 200 mg.    These Medications have changed    No medications on file   Stop Taking    No medications on file

## 2018-02-05 LAB
BACTERIA SPEC CULT: ABNORMAL
BACTERIA SPEC CULT: ABNORMAL
SERVICE CMNT-IMP: ABNORMAL

## 2018-02-07 ENCOUNTER — OFFICE VISIT (OUTPATIENT)
Dept: SURGERY | Age: 33
End: 2018-02-07

## 2018-02-07 VITALS
BODY MASS INDEX: 34.07 KG/M2 | HEART RATE: 75 BPM | HEIGHT: 70 IN | DIASTOLIC BLOOD PRESSURE: 87 MMHG | SYSTOLIC BLOOD PRESSURE: 124 MMHG | RESPIRATION RATE: 16 BRPM | WEIGHT: 238 LBS | OXYGEN SATURATION: 100 %

## 2018-02-07 DIAGNOSIS — Z98.84 S/P LAPAROSCOPIC SLEEVE GASTRECTOMY: ICD-10-CM

## 2018-02-07 DIAGNOSIS — K90.9 INTESTINAL MALABSORPTION, UNSPECIFIED TYPE: Primary | ICD-10-CM

## 2018-02-07 NOTE — PROGRESS NOTES
Subjective:      Jie Mayes is a 27 y.o. female is now 2 months status post laparoscopic sleeve gastrectomy. Doing well overall. She has lost a total of 38 pounds since surgery. Body mass index is 34.15 kg/(m^2). Has lost 30% of EBW. Currently on a solid food diet without difficulty, reports no issues and denies vomiting and abdominal pain. She goes through phases of wanting to eat and not wanting to eat. Taking in 50oz water daily. Sources of protein include shakes, nuts, chicken, shrimp, steak, eggs. Eating 2 meals each day. Has not been exercising. She has been eating slim jims, peanuts and jello often. Bowel movements are alternating constipation and regularity. The patient is not having any pain. . The patient is compliant with multivitamins, calcium, Vit D, B complex and B12 supplements.      Weight Loss Metrics 2/7/2018 2/2/2018 1/11/2018 12/28/2017 12/15/2017 12/14/2017 12/4/2017   Today's Wt 238 lb 243 lb 250 lb 255 lb 8 oz 290 lb 9.6 oz - 276 lb   BMI 34.15 kg/m2 34.87 kg/m2 35.87 kg/m2 36.66 kg/m2 - 41.7 kg/m2 39.6 kg/m2          Comorbidities:    Hypertension: improved, no medications  Diabetes: improved, no medications  Obstructive Sleep Apnea: not applicable  Hyperlipidemia: not applicable  Stress Urinary Incontinence: not applicable  Gastroesophageal Reflux: not applicable  Weight related arthropathy:improved     Patient Active Problem List   Diagnosis Code    PCOS (polycystic ovarian syndrome) E28.2    Diabetes ([facility] [address] E11.9    Endometrial polyp N84.0    Morbid obesity ([address] E66.01    Morbid obesity with body mass index of 40.0-49.9 ([facility]) E66.01    Hypertension I10    Irregular menses N92.6    Arthritic-like pain M25.50    Chronic back pain M54.9, G89.29    Severe obesity with body mass index (BMI) of 35.0 to 39.9 with comorbidity ([facility]) E66.01    Intestinal malabsorption K90.9    S/P laparoscopic sleeve gastrectomy Z98.84    Post-operative pain G89.18    LLQ abdominal pain R10.32        Past Medical History:   Diagnosis Date    Arthritic-like pain     knees and elbows    Chronic back pain     has required steroid injections    Diabetes ([facility] [address]     Dx 2005    Endometrial polyp     Irregular menses     Morbid obesity ([facility] [address] [facility]     Morbid obesity with body mass index of 40.0-49.9 (HCC)     PCOS (polycystic ovarian syndrome)     Endometriosis    Psychiatric disorder     depression       Past Surgical History:   Procedure Laterality Date    HX GYN  03/31/2017    D&C       Current Outpatient Prescriptions   Medication Sig Dispense Refill    PEDIATRIC MULTIVITAMIN NO.76 (FLINTSTONES COMPLETE PO) Take 1 Tab by mouth two (2) times a day.  nitrofurantoin, macrocrystal-monohydrate, (MACROBID) 100 mg capsule Take 1 Cap by mouth two (2) times a day for 7 days. Indications: BACTERIAL URINARY TRACT INFECTION 14 Cap 0    traMADol (ULTRAM) 50 mg tablet Take 1 Tab by mouth every six (6) hours as needed for Pain. Max Daily Amount: 200 mg. 30 Tab 0    citalopram (CELEXA) 20 mg tablet Take 20 mg by mouth daily. Indications: ANXIETY WITH DEPRESSION         Allergies   Allergen Reactions    Percocet [Oxycodone-Acetaminophen] Nausea and Vomiting         Objective:     Visit Vitals    /87 (BP 1 Location: Left arm, BP Patient Position: Sitting)    Pulse 75    Ht 5' 10\" (1.778 m)    Wt 108 kg (238 lb)    SpO2 100%    BMI 34.15 kg/m2       General:  alert, cooperative, no distress, appears stated age   Chest: lungs clear to auscultation, no rhonchi, rales or wheezes, no accessory muscle use   Cor:   Regular rate and rhythm or without murmur or extra heart sounds   Abdomen: soft, bowel sounds active, non-tender, no masses or organomegaly   Incisions:   healing well, no drainage, no erythema, no hernia, no seroma, no swelling, no dehiscence, incision well approximated       Assessment:   History of Morbid obesity, status post laparoscopic sleeve gastrectomy.   Doing well postoperatively. Plan:     1. Increase activity to the goal of 30 minutes daily, Increase fluids and needs to eat every 2.5 to 3 hours. 2. Gas X to see if it helps to be able to eat more times a day. 3. Reminded to measure portions, continue high protein, low carbohydrate diet. Reminded to eat regularly, to eat slowly & not to drink with meals. 4. Continue vitamin supplementation  5. Continue current medications and follow up with PCP for management of regimen. 6. Continue cardio exercise and resistance exercises. 60-90 min aerobic exercise 5 times a week and strength training 2 days each week. 7. Encouraged to attend support group   8. I have discussed this plan with patient and they verbalized understanding  9. Follow up in 2 months or sooner if patient has questions, concerns or worsening of condition, if unable to reach our office, patient should report to the ED. 8. Ms. Angelina Jarquin has a reminder for a \"due or due soon\" health maintenance. I have asked that she contact her primary care provider for a follow-up on this health maintenance.

## 2018-02-07 NOTE — MR AVS SNAPSHOT
[facility] 
 
 
 [address] [facility] [address] [facility] 150 
[phone] Patient: Delilah Stiles MRN: [id number] VDX:[id number] Visit Information Date & Time Provider Department Dept. Phone Encounter #  
 2/7/2018 10:00 AM Bert Morejon [facility] [phone] [id number] Follow-up Instructions Return in about 2 months (around 4/7/2018). Your Appointments 4/10/2018 10:30 AM  
Office Visit with KALEY Wei [facility] ([facility]) Appt Note: 4 mo po  
 [address] 98 [facility] 87  
  
   
 [address] [place] [address] Upcoming Health Maintenance Date Due  
 FOOT EXAM Q1 3/21/1993 MICROALBUMIN Q1 3/21/1993 EYE EXAM RETINAL OR DILATED Q1 3/21/1993 Pneumococcal 19-64 Medium Risk (1 of 1 - PPSV23) 3/21/2002 LIPID PANEL Q1 12/3/2015 Influenza Age 5 to Adult 8/1/2017 HEMOGLOBIN A1C Q6M 6/4/2018 PAP AKA CERVICAL CYTOLOGY 3/15/2020 DTaP/Tdap/Td series (2 - Td) 12/14/2023 Allergies as of 2/7/2018  Review Complete On: 2/7/2018 By: Ricardo Batres Severity Noted Reaction Type Reactions Percocet [Oxycodone-acetaminophen]  12/04/2017    Nausea and Vomiting Current Immunizations  Reviewed on 12/14/2017 Name Date TDAP Vaccine 11/7/2012 11:37 AM  
 Tdap 12/14/2013  8:07 PM  
  
 Not reviewed this visit You Were Diagnosed With   
  
 Codes Comments Intestinal malabsorption, unspecified type    -  Primary ICD-10-CM: K90.9 ICD-9-CM: 579.9 S/P laparoscopic sleeve gastrectomy     ICD-10-CM: U29.12 ICD-9-CM: V45.86 Vitals BP Pulse Height(growth percentile) Weight(growth percentile) SpO2 BMI  
 124/87 (BP 1 Location: Left arm, BP Patient Position: Sitting) 75 5' 10\" (1.778 m) 238 lb (108 kg) 100% 34.15 kg/m2 OB Status Smoking Status Polycystic Ovarian Syndrome Never Smoker BMI and BSA Data Body Mass Index Body Surface Area  
 34.15 kg/m 2 2.31 m 2 Preferred Pharmacy Pharmacy Name Phone [address] [place] [address]. [phone] Your Updated Medication List  
  
   
This list is accurate as of: 2/7/18 11:02 AM.  Always use your most recent med list.  
  
  
  
  
 CeleXA 20 mg tablet Generic drug:  citalopram  
Take 20 mg by mouth daily. Indications: ANXIETY WITH DEPRESSION  
  
 FLINTSTONES COMPLETE PO Take 1 Tab by mouth two (2) times a day. nitrofurantoin (macrocrystal-monohydrate) 100 mg capsule Commonly known as:  MACROBID Take 1 Cap by mouth two (2) times a day for 7 days. Indications: BACTERIAL URINARY TRACT INFECTION  
  
 traMADol 50 mg tablet Commonly known as:  ULTRAM  
Take 1 Tab by mouth every six (6) hours as needed for Pain. Max Daily Amount: 200 mg. Follow-up Instructions Return in about 2 months (around 4/7/2018). Patient Instructions Patient Instructions 1. Remember hydration goals - minimum of 64 ounces of liquids per day (dehydration is the number one reason for hospital readmission). 2. Continue to monitor carbohydrate and protein intake you need a minimum of  Grams of protein daily- remember to keep your total carbohydrates to 50 grams or less per day for best results. 3. Continue to work towards exercise goals - 60-90 minutes, 5 times a week minimum of deliberate, aerobic exercise is the ultimate goal with strength training 2 times each week. Refer to Poppin for  information. 4. Remember to take vitamins as directed in your handbook. 5. Attend support group the 2nd Thursday of each month. 6. Use Miralax if you become constipated. 7. Call us at [phone] or email us through [email or web address]" with questions,     concerns or worsening of condition, we have someone on call 24 hours a day. If you are unable to reach our office, you are to go to your Primary Care Physician or the Emergency Department. Supplement Resource Guide Importance of Protein:  
Maintains lean body mass, produces antibodies to fight off infections, heals wounds, minimizes hair loss, helps to give you energy, helps with satiety, and keeping you full between meals. Importance of Calcium: 
Needed for healthy bones and teeth, normal blood clotting, and nervous system functioning, higher risk of osteoporosis and bone disease with non-compliance. Importance of Multivitamins: Many functions. Supply you with extra nutrients that you may be missing from food. May lead to iron deficiency anemia, weakness, fatigue, and many other symptoms with non-compliance. Importance of B Vitamins: 
Important for red blood cell formation, metabolism, energy, and helps to maintain a healthy nervous system. Protein Supplement Find one you like now. Use immediately after surgery. Look for: 
35-50g protein each day from your protein supplement once you reach the progression diet. 0-3 g fat per serving 0-3 g sugar per serving Protein drinks should be split in separate dosages. Recommend: Lifelong 1 year + Calcium Supplement:  
 
Start taking within a month after surgery. Look for: Calcium Citrate Plus D (1500 mg per day) Recommend: Citracal 
 
 . Avoid chocolate chewable calcium. Can use chewable bariatric or GNC brand or similar chewable. The body cannot absorb more than 500-600 mg of calcium at a time. Take for Life Multi-vitamin Supplement:   
 
Start immediately after surgery: any complete chewable, such as: Pickenss Complete chewables. Avoid Pickens sours or gummies.   They lack iron and other important nutrients and also have added sugar. Continue with chewable vitamin or change to adult complete multivitamin one month after surgery. Menstruating women can take a prenatal vitamin. Make sure has at least 18 mg iron and 941-545 mcg folic acid Vitamin B12, B Complex Vitamin, and Biotin Start taking within a month after surgery. Vitamin B12:  1000 mcg of Vitamin B12 three times weekly Must take sublingually (meaning you take it under your tongue) or in a liquid drop form for easy absorption. B Complex Vitamin: Take a pill or liquid drop form once daily. Biotin: This vitamin can help prevent hair loss. Recommend 5mg  
(5000 mcg) a day Biotin is Optional  
 
 
 
 
  
Body Mass Index: Care Instructions Your Care Instructions Body mass index (BMI) can help you see if your weight is raising your risk for health problems. It uses a formula to compare how much you weigh with how tall you are. · A BMI lower than 18.5 is considered underweight. · A BMI between 18.5 and 24.9 is considered healthy. · A BMI between 25 and 29.9 is considered overweight. A BMI of 30 or higher is considered obese. If your BMI is in the normal range, it means that you have a lower risk for weight-related health problems. If your BMI is in the overweight or obese range, you may be at increased risk for weight-related health problems, such as high blood pressure, heart disease, stroke, arthritis or joint pain, and diabetes. If your BMI is in the underweight range, you may be at increased risk for health problems such as fatigue, lower protection (immunity) against illness, muscle loss, bone loss, hair loss, and hormone problems. BMI is just one measure of your risk for weight-related health problems. You may be at higher risk for health problems if you are not active, you eat an unhealthy diet, or you drink too much alcohol or use tobacco products. Follow-up care is a key part of your treatment and safety. Be sure to make and go to all appointments, and call your doctor if you are having problems. It's also a good idea to know your test results and keep a list of the medicines you take. How can you care for yourself at home? · Practice healthy eating habits. This includes eating plenty of fruits, vegetables, whole grains, lean protein, and low-fat dairy. · If your doctor recommends it, get more exercise. Walking is a good choice. Bit by bit, increase the amount you walk every day. Try for at least 30 minutes on most days of the week. · Do not smoke. Smoking can increase your risk for health problems. If you need help quitting, talk to your doctor about stop-smoking programs and medicines. These can increase your chances of quitting for good. · Limit alcohol to 2 drinks a day for men and 1 drink a day for women. Too much alcohol can cause health problems. If you have a BMI higher than 25 · Your doctor may do other tests to check your risk for weight-related health problems. This may include measuring the distance around your waist. A waist measurement of more than 40 inches in men or 35 inches in women can increase the risk of weight-related health problems. · Talk with your doctor about steps you can take to stay healthy or improve your health. You may need to make lifestyle changes to lose weight and stay healthy, such as changing your diet and getting regular exercise. If you have a BMI lower than 18.5 · Your doctor may do other tests to check your risk for health problems. · Talk with your doctor about steps you can take to stay healthy or improve your health. You may need to make lifestyle changes to gain or maintain weight and stay healthy, such as getting more healthy foods in your diet and doing exercises to build muscle. Where can you learn more? Go to http://león-irving.info/. Enter S176 in the search box to learn more about \"Body Mass Index: Care Instructions. \" Current as of: October 13, 2016 Content Version: 11.4 © 9286-3082 [employer]. Care instructions adapted under license by [employer] (which disclaims liability or warranty for this information). If you have questions about a medical condition or this instruction, always ask your healthcare professional. [address] any warranty or liability for your use of this information. Introducing [facility] [place] [facility]! Dear Kenzie Layman: Thank you for requesting a Appiphany account. Our records indicate that you already have an active Appiphany account. You can access your account anytime at [email or web address] Did you know that you can access your hospital and ER discharge instructions at any time in Appiphany? You can also review all of your test results from your hospital stay or ER visit. Additional Information If you have questions, please visit the Frequently Asked Questions section of the Appiphany website at [email or web address] Remember, Appiphany is NOT to be used for urgent needs. For medical emergencies, dial 911. Now available from your iPhone and Android! Please provide this summary of care documentation to your next provider. Your primary care clinician is listed as Leila Garcia. If you have any questions after today's visit, please call [phone].

## 2018-02-07 NOTE — PATIENT INSTRUCTIONS
Patient Instructions      1. Remember hydration goals - minimum of 64 ounces of liquids per day (dehydration is the number one reason for hospital readmission). 2. Continue to monitor carbohydrate and protein intake you need a minimum of  Grams of protein daily- remember to keep your total carbohydrates to 50 grams or less per day for best results. 3. Continue to work towards exercise goals - 60-90 minutes, 5 times a week minimum of deliberate, aerobic exercise is the ultimate goal with strength training 2 times each week. Refer to [employer] for  information. 4. Remember to take vitamins as directed in your handbook. 5. Attend support group the 2nd Thursday of each month. 6. Use Miralax if you become constipated. 7. Call us at [phone] or email us through [email or web address] with questions,     concerns or worsening of condition, we have someone on call 24 hours a day. If you are unable to reach our office, you are to go to your Primary Care Physician or the Emergency Department. Supplement Resource Guide    Importance of Protein:   Maintains lean body mass, produces antibodies to fight off infections, heals wounds, minimizes hair loss, helps to give you energy, helps with satiety, and keeping you full between meals. Importance of Calcium:  Needed for healthy bones and teeth, normal blood clotting, and nervous system functioning, higher risk of osteoporosis and bone disease with non-compliance. Importance of Multivitamins: Many functions. Supply you with extra nutrients that you may be missing from food. May lead to iron deficiency anemia, weakness, fatigue, and many other symptoms with non-compliance. Importance of B Vitamins:  Important for red blood cell formation, metabolism, energy, and helps to maintain a healthy nervous system. Protein Supplement  Find one you like now. Use immediately after surgery. Look for:  35-50g protein each day from your protein supplement once you reach the progression diet. 0-3 g fat per serving  0-3 g sugar per serving    Protein drinks should be split in separate dosages. Recommend: Lifelong  1 year + Calcium Supplement:     Start taking within a month after surgery. Look for: Calcium Citrate Plus D (1500 mg per day)  Recommend: Citracal     .            Avoid chocolate chewable calcium. Can use chewable bariatric or GNC brand or similar chewable. The body cannot absorb more than 500-600 mg of calcium at a time. Take for Life Multi-vitamin Supplement:      Start immediately after surgery: any complete chewable, such as: Pigeons Complete chewables. Avoid Pigeon sours or gummies. They lack iron and other important nutrients and also have added sugar. Continue with chewable vitamin or change to adult complete multivitamin one month after surgery. Menstruating women can take a prenatal vitamin. Make sure has at least 18 mg iron and 100-166 mcg folic acid   Vitamin N40, B Complex Vitamin, and Biotin  Start taking within a month after surgery. Vitamin B12:  1000 mcg of Vitamin B12 three times weekly    Must take sublingually (meaning you take it under your tongue) or in a liquid drop form for easy absorption. B Complex Vitamin: Take a pill or liquid drop form once daily. Biotin: This vitamin can help prevent hair loss. Recommend 5mg   (5000 mcg) a day  Biotin is Optional              Body Mass Index: Care Instructions  Your Care Instructions    Body mass index (BMI) can help you see if your weight is raising your risk for health problems. It uses a formula to compare how much you weigh with how tall you are. · A BMI lower than 18.5 is considered underweight. · A BMI between 18.5 and 24.9 is considered healthy. · A BMI between 25 and 29.9 is considered overweight. A BMI of 30 or higher is considered obese.   If your BMI is in the normal range, it means that you have a lower risk for weight-related health problems. If your BMI is in the overweight or obese range, you may be at increased risk for weight-related health problems, such as high blood pressure, heart disease, stroke, arthritis or joint pain, and diabetes. If your BMI is in the underweight range, you may be at increased risk for health problems such as fatigue, lower protection (immunity) against illness, muscle loss, bone loss, hair loss, and hormone problems. BMI is just one measure of your risk for weight-related health problems. You may be at higher risk for health problems if you are not active, you eat an unhealthy diet, or you drink too much alcohol or use tobacco products. Follow-up care is a key part of your treatment and safety. Be sure to make and go to all appointments, and call your doctor if you are having problems. It's also a good idea to know your test results and keep a list of the medicines you take. How can you care for yourself at home? · Practice healthy eating habits. This includes eating plenty of fruits, vegetables, whole grains, lean protein, and low-fat dairy. · If your doctor recommends it, get more exercise. Walking is a good choice. Bit by bit, increase the amount you walk every day. Try for at least 30 minutes on most days of the week. · Do not smoke. Smoking can increase your risk for health problems. If you need help quitting, talk to your doctor about stop-smoking programs and medicines. These can increase your chances of quitting for good. · Limit alcohol to 2 drinks a day for men and 1 drink a day for women. Too much alcohol can cause health problems. If you have a BMI higher than 25  · Your doctor may do other tests to check your risk for weight-related health problems.  This may include measuring the distance around your waist. A waist measurement of more than 40 inches in men or 35 inches in women can increase the risk of weight-related health problems. · Talk with your doctor about steps you can take to stay healthy or improve your health. You may need to make lifestyle changes to lose weight and stay healthy, such as changing your diet and getting regular exercise. If you have a BMI lower than 18.5  · Your doctor may do other tests to check your risk for health problems. · Talk with your doctor about steps you can take to stay healthy or improve your health. You may need to make lifestyle changes to gain or maintain weight and stay healthy, such as getting more healthy foods in your diet and doing exercises to build muscle. Where can you learn more? Go to http://león-irving.info/. Enter S176 in the search box to learn more about \"Body Mass Index: Care Instructions. \"  Current as of: October 13, 2016  Content Version: 11.4  © 9882-6951 [employer]. Care instructions adapted under license by [employer] (which disclaims liability or warranty for this information). If you have questions about a medical condition or this instruction, always ask your healthcare professional. [address] any warranty or liability for your use of this information.

## 2018-02-08 RX ORDER — SULFAMETHOXAZOLE AND TRIMETHOPRIM 800; 160 MG/1; MG/1
1 TABLET ORAL 2 TIMES DAILY
Qty: 14 TAB | Refills: 0 | Status: SHIPPED | OUTPATIENT
Start: 2018-02-08 | End: 2018-02-10

## 2018-02-08 NOTE — CALL BACK NOTE
Called patient and updated on the urine culture [facility]

## 2018-02-10 RX ORDER — CIPROFLOXACIN 500 MG/1
500 TABLET ORAL 2 TIMES DAILY
Qty: 14 TAB | Refills: 0 | Status: SHIPPED | OUTPATIENT
Start: 2018-02-10 | End: 2018-02-14

## 2018-02-14 RX ORDER — CIPROFLOXACIN 500 MG/1
500 TABLET ORAL 2 TIMES DAILY
Qty: 14 TAB | Refills: 0 | Status: SHIPPED | OUTPATIENT
Start: 2018-02-14 | End: 2018-02-21

## 2018-03-07 ENCOUNTER — APPOINTMENT (OUTPATIENT)
Dept: GENERAL RADIOLOGY | Age: 33
End: 2018-03-07
Attending: EMERGENCY MEDICINE
Payer: COMMERCIAL

## 2018-03-07 ENCOUNTER — HOSPITAL ENCOUNTER (EMERGENCY)
Age: 33
Discharge: HOME | End: 2018-03-07
Attending: EMERGENCY MEDICINE | Admitting: EMERGENCY MEDICINE
Payer: COMMERCIAL

## 2018-03-07 VITALS
HEART RATE: 66 BPM | TEMPERATURE: 97.7 F | RESPIRATION RATE: 16 BRPM | WEIGHT: 235 LBS | OXYGEN SATURATION: 98 % | BODY MASS INDEX: 33.72 KG/M2 | DIASTOLIC BLOOD PRESSURE: 84 MMHG | SYSTOLIC BLOOD PRESSURE: 128 MMHG

## 2018-03-07 DIAGNOSIS — B34.9 VIRAL ILLNESS: ICD-10-CM

## 2018-03-07 DIAGNOSIS — R07.9 CHEST PAIN, UNSPECIFIED TYPE: Primary | ICD-10-CM

## 2018-03-07 LAB
ALBUMIN SERPL-MCNC: 3.4 G/DL (ref 3.4–5)
ALBUMIN/GLOB SERPL: 1.1 {RATIO} (ref 0.8–1.7)
ALP SERPL-CCNC: 79 U/L (ref 45–117)
ALT SERPL-CCNC: 30 U/L (ref 13–56)
ANION GAP SERPL CALC-SCNC: 7 MMOL/L (ref 3–18)
AST SERPL-CCNC: 18 U/L (ref 15–37)
ATRIAL RATE: 69 BPM
BASOPHILS # BLD: 0.1 K/UL (ref 0–0.06)
BASOPHILS NFR BLD: 1 % (ref 0–2)
BILIRUB SERPL-MCNC: 0.3 MG/DL (ref 0.2–1)
BUN SERPL-MCNC: 7 MG/DL (ref 7–18)
BUN/CREAT SERPL: 12 (ref 12–20)
CALCIUM SERPL-MCNC: 9 MG/DL (ref 8.5–10.1)
CALCULATED P AXIS, ECG09: 35 DEGREES
CALCULATED R AXIS, ECG10: 29 DEGREES
CALCULATED T AXIS, ECG11: 28 DEGREES
CHLORIDE SERPL-SCNC: 106 MMOL/L (ref 100–108)
CK MB CFR SERPL CALC: NORMAL % (ref 0–4)
CK MB SERPL-MCNC: <1 NG/ML (ref 5–25)
CK SERPL-CCNC: 77 U/L (ref 26–192)
CO2 SERPL-SCNC: 29 MMOL/L (ref 21–32)
CREAT SERPL-MCNC: 0.59 MG/DL (ref 0.6–1.3)
DIAGNOSIS, 93000: NORMAL
DIFFERENTIAL METHOD BLD: ABNORMAL
EOSINOPHIL # BLD: 0.6 K/UL (ref 0–0.4)
EOSINOPHIL NFR BLD: 6 % (ref 0–5)
ERYTHROCYTE [DISTWIDTH] IN BLOOD BY AUTOMATED COUNT: 16.1 % (ref 11.6–14.5)
FLUAV AG NPH QL IA: NEGATIVE
FLUBV AG NOSE QL IA: NEGATIVE
GLOBULIN SER CALC-MCNC: 3.2 G/DL (ref 2–4)
GLUCOSE SERPL-MCNC: 87 MG/DL (ref 74–99)
HCT VFR BLD AUTO: 36.4 % (ref 35–45)
HGB BLD-MCNC: 11.7 G/DL (ref 12–16)
LYMPHOCYTES # BLD: 3.5 K/UL (ref 0.9–3.6)
LYMPHOCYTES NFR BLD: 34 % (ref 21–52)
MCH RBC QN AUTO: 27.1 PG (ref 24–34)
MCHC RBC AUTO-ENTMCNC: 32.1 G/DL (ref 31–37)
MCV RBC AUTO: 84.5 FL (ref 74–97)
MONOCYTES # BLD: 0.9 K/UL (ref 0.05–1.2)
MONOCYTES NFR BLD: 9 % (ref 3–10)
NEUTS SEG # BLD: 5.1 K/UL (ref 1.8–8)
NEUTS SEG NFR BLD: 50 % (ref 40–73)
P-R INTERVAL, ECG05: 154 MS
PLATELET # BLD AUTO: 323 K/UL (ref 135–420)
PMV BLD AUTO: 9.7 FL (ref 9.2–11.8)
POTASSIUM SERPL-SCNC: 3.4 MMOL/L (ref 3.5–5.5)
PROT SERPL-MCNC: 6.6 G/DL (ref 6.4–8.2)
Q-T INTERVAL, ECG07: 420 MS
QRS DURATION, ECG06: 76 MS
QTC CALCULATION (BEZET), ECG08: 450 MS
RBC # BLD AUTO: 4.31 M/UL (ref 4.2–5.3)
SODIUM SERPL-SCNC: 142 MMOL/L (ref 136–145)
TROPONIN I SERPL-MCNC: <0.02 NG/ML (ref 0–0.06)
VENTRICULAR RATE, ECG03: 69 BPM
WBC # BLD AUTO: 10.2 K/UL (ref 4.6–13.2)

## 2018-03-07 PROCEDURE — 99284 EMERGENCY DEPT VISIT MOD MDM: CPT

## 2018-03-07 PROCEDURE — 87804 INFLUENZA ASSAY W/OPTIC: CPT | Performed by: EMERGENCY MEDICINE

## 2018-03-07 PROCEDURE — 85025 COMPLETE CBC W/AUTO DIFF WBC: CPT | Performed by: EMERGENCY MEDICINE

## 2018-03-07 PROCEDURE — 96361 HYDRATE IV INFUSION ADD-ON: CPT

## 2018-03-07 PROCEDURE — 74011250636 HC RX REV CODE- 250/636: Performed by: EMERGENCY MEDICINE

## 2018-03-07 PROCEDURE — 80053 COMPREHEN METABOLIC PANEL: CPT | Performed by: EMERGENCY MEDICINE

## 2018-03-07 PROCEDURE — 71045 X-RAY EXAM CHEST 1 VIEW: CPT

## 2018-03-07 PROCEDURE — 96374 THER/PROPH/DIAG INJ IV PUSH: CPT

## 2018-03-07 PROCEDURE — 82550 ASSAY OF CK (CPK): CPT | Performed by: EMERGENCY MEDICINE

## 2018-03-07 PROCEDURE — 93005 ELECTROCARDIOGRAM TRACING: CPT

## 2018-03-07 RX ORDER — OSELTAMIVIR PHOSPHATE 75 MG/1
75 CAPSULE ORAL 2 TIMES DAILY
Qty: 10 CAP | Refills: 0 | Status: SHIPPED | OUTPATIENT
Start: 2018-03-07 | End: 2018-03-12

## 2018-03-07 RX ORDER — KETOROLAC TROMETHAMINE 30 MG/ML
30 INJECTION, SOLUTION INTRAMUSCULAR; INTRAVENOUS
Status: COMPLETED | OUTPATIENT
Start: 2018-03-07 | End: 2018-03-07

## 2018-03-07 RX ORDER — TRAMADOL HYDROCHLORIDE 50 MG/1
50 TABLET ORAL
Qty: 20 TAB | Refills: 0 | Status: SHIPPED | OUTPATIENT
Start: 2018-03-07 | End: 2019-06-17

## 2018-03-07 RX ORDER — KETOROLAC TROMETHAMINE 30 MG/ML
INJECTION, SOLUTION INTRAMUSCULAR; INTRAVENOUS
Status: DISCONTINUED
Start: 2018-03-07 | End: 2018-03-07 | Stop reason: HOSPADM

## 2018-03-07 RX ORDER — NAPROXEN 500 MG/1
500 TABLET ORAL 2 TIMES DAILY WITH MEALS
Qty: 20 TAB | Refills: 0 | Status: SHIPPED | OUTPATIENT
Start: 2018-03-07 | End: 2019-06-17

## 2018-03-07 RX ADMIN — KETOROLAC TROMETHAMINE 30 MG: 30 INJECTION, SOLUTION INTRAMUSCULAR; INTRAVENOUS at 01:19

## 2018-03-07 RX ADMIN — SODIUM CHLORIDE 1000 ML: 900 INJECTION, SOLUTION INTRAVENOUS at 01:09

## 2018-03-07 NOTE — DISCHARGE INSTRUCTIONS
Chest Pain: Care Instructions  Your Care Instructions    There are many things that can cause chest pain. Some are not serious and will get better on their own in a few days. But some kinds of chest pain need more testing and treatment. Your doctor may have recommended a follow-up visit in the next 8 to 12 hours. If you are not getting better, you may need more tests or treatment. Even though your doctor has released you, you still need to watch for any problems. The doctor carefully checked you, but sometimes problems can develop later. If you have new symptoms or if your symptoms do not get better, get medical care right away. If you have worse or different chest pain or pressure that lasts more than 5 minutes or you passed out (lost consciousness), call 911 or seek other emergency help right away. A medical visit is only one step in your treatment. Even if you feel better, you still need to do what your doctor recommends, such as going to all suggested follow-up appointments and taking medicines exactly as directed. This will help you recover and help prevent future problems. How can you care for yourself at home? · Rest until you feel better. · Take your medicine exactly as prescribed. Call your doctor if you think you are having a problem with your medicine. · Do not drive after taking a prescription pain medicine. When should you call for help? Call 911 if:  ? · You passed out (lost consciousness). ? · You have severe difficulty breathing. ? · You have symptoms of a heart attack. These may include:  ¨ Chest pain or pressure, or a strange feeling in your chest.  ¨ Sweating. ¨ Shortness of breath. ¨ Nausea or vomiting. ¨ Pain, pressure, or a strange feeling in your back, neck, jaw, or upper belly or in one or both shoulders or arms. ¨ Lightheadedness or sudden weakness. ¨ A fast or irregular heartbeat.   After you call 911, the  may tell you to chew 1 adult-strength or 2 to 4 low-dose aspirin. Wait for an ambulance. Do not try to drive yourself. ?Call your doctor today if:  ? · You have any trouble breathing. ? · Your chest pain gets worse. ? · You are dizzy or lightheaded, or you feel like you may faint. ? · You are not getting better as expected. ? · You are having new or different chest pain. Where can you learn more? Go to http://león-irving.info/. Enter A120 in the search box to learn more about \"Chest Pain: Care Instructions. \"  Current as of: March 20, 2017  Content Version: 11.4  © 1565-7207 [employer]. Care instructions adapted under license by [employer] (which disclaims liability or warranty for this information). If you have questions about a medical condition or this instruction, always ask your healthcare professional. [address] any warranty or liability for your use of this information.

## 2018-03-07 NOTE — ED PROVIDER NOTES
EMERGENCY DEPARTMENT HISTORY AND PHYSICAL EXAM    12:52 AM      Date: 3/7/2018  Patient Name: Maximino Blackmon    History of Presenting Illness     Chief Complaint   Patient presents with    Chest Pain    Cough    Nasal Congestion    Headache         History Provided By: Patient    Chief Complaint: Cough, congestion, h/a, chest pain and general malaise   Duration:  Days  Timing:  Constant and Worsening  Location: n/a  Quality: Aching all over body  Severity: Mild  Modifying Factors: No worsening or alleviating factors. Pt tried nothing for this PTA. Associated Symptoms: denies any other associated signs or symptoms      Additional History (Context): Maximino Blackmon is a 27 y.o. female with PmHx of DM, PCOS, and gastric sleeve who presents with mild constant worsening dry cough, congestion, ha, generalized chest pain and general malaise x 3 days. Pt isn't sure if she's had flu contacts. No worsening or alleviating factors. Pt tried nothing for this PTA. Pt notes decrease appetite, diarrhea yesterday, but denies fever, chills, urinary sx's, and any other SX or complaints at this time. LMP: 2/6/18    PCP: Ben Leroy MD    Current Outpatient Prescriptions   Medication Sig Dispense Refill    naproxen (NAPROSYN) 500 mg tablet Take 1 Tab by mouth two (2) times daily (with meals). 20 Tab 0    oseltamivir (TAMIFLU) 75 mg capsule Take 1 Cap by mouth two (2) times a day for 5 days. 10 Cap 0    PEDIATRIC MULTIVITAMIN NO.76 (FLINTSTONES COMPLETE PO) Take 1 Tab by mouth two (2) times a day.  traMADol (ULTRAM) 50 mg tablet Take 1 Tab by mouth every six (6) hours as needed for Pain. Max Daily Amount: 200 mg. 30 Tab 0    citalopram (CELEXA) 20 mg tablet Take 20 mg by mouth daily.  Indications: ANXIETY WITH DEPRESSION         Past History     Past Medical History:  Past Medical History:   Diagnosis Date    Arthritic-like pain     knees and elbows    Chronic back pain     has required steroid injections    Diabetes ([facility]     Dx 2005    Endometrial polyp     Irregular menses     Morbid obesity ([facility]     Morbid obesity with body mass index of 40.0-49.9 (HCC)     PCOS (polycystic ovarian syndrome)     Endometriosis    Psychiatric disorder     depression       Past Surgical History:  Past Surgical History:   Procedure Laterality Date    HX GYN  03/31/2017    D&C       Family History:  Family History   Problem Relation Age of Onset    Diabetes Maternal Aunt     Diabetes Paternal Grandmother     Diabetes Paternal Grandfather     Diabetes Mother        Social History:  Social History   Substance Use Topics    Smoking status: Never Smoker    Smokeless tobacco: Never Used    Alcohol use No       Allergies: Allergies   Allergen Reactions    Percocet [Oxycodone-Acetaminophen] Nausea and Vomiting         Review of Systems       Review of Systems   Constitutional: Positive for appetite change and fatigue. Negative for chills, diaphoresis and fever. HENT: Positive for congestion. Negative for rhinorrhea and sore throat. Eyes: Negative. Negative for pain, discharge and redness. Respiratory: Positive for cough. Negative for chest tightness, shortness of breath and wheezing. Cardiovascular: Positive for chest pain. Gastrointestinal: Positive for diarrhea. Negative for abdominal pain, constipation, nausea and vomiting. Genitourinary: Negative. Negative for dysuria, flank pain, frequency, hematuria and urgency. Musculoskeletal: Positive for myalgias. Negative for back pain and neck pain. Skin: Negative. Negative for rash. Neurological: Negative. Negative for syncope, weakness, numbness and headaches. Psychiatric/Behavioral: Negative. All other systems reviewed and are negative.         Physical Exam     Visit Vitals    /84    Pulse 66    Temp 97.7 °F (36.5 °C)    Resp 16    Wt 106.6 kg (235 lb)    LMP 02/06/2018    SpO2 98%    BMI 33.72 kg/m2         Physical Exam Constitutional: She is oriented to person, place, and time. She appears well-developed and well-nourished. Non-toxic appearance. She does not have a sickly appearance. She does not appear ill. No distress. HENT:   Head: Normocephalic and atraumatic. Mouth/Throat: Oropharynx is clear and moist. No oropharyngeal exudate. Eyes: Conjunctivae and EOM are normal. Pupils are equal, round, and reactive to light. No scleral icterus. Neck: Normal range of motion. Neck supple. No hepatojugular reflux and no JVD present. No tracheal deviation present. No thyromegaly present. Cardiovascular: Normal rate, regular rhythm, S1 normal, S2 normal, normal heart sounds, intact distal pulses and normal pulses. Exam reveals no gallop, no S3 and no S4. No murmur heard. Pulses:       Radial pulses are 2+ on the right side, and 2+ on the left side. Dorsalis pedis pulses are 2+ on the right side, and 2+ on the left side. Pulmonary/Chest: Effort normal and breath sounds normal. No respiratory distress. She has no decreased breath sounds. She has no wheezes. She has no rhonchi. She has no rales. Abdominal: Soft. Normal appearance and bowel sounds are normal. She exhibits no distension and no mass. There is no hepatosplenomegaly. There is no tenderness. There is no rigidity, no rebound, no guarding, no CVA tenderness, no tenderness at McBurney's point and negative Medina's sign. Musculoskeletal: Normal range of motion. She exhibits no edema or tenderness. Strength 4/5 throughout    Lymphadenopathy:        Head (right side): No submental, no submandibular, no preauricular and no occipital adenopathy present. Head (left side): No submental, no submandibular, no preauricular and no occipital adenopathy present. She has no cervical adenopathy. Right: No supraclavicular adenopathy present. Left: No supraclavicular adenopathy present.    Neurological: She is alert and oriented to person, place, and time. She has normal strength and normal reflexes. She is not disoriented. No cranial nerve deficit or sensory deficit. Coordination and gait normal. GCS eye subscore is 4. GCS verbal subscore is 5. GCS motor subscore is 6. Grossly intact    Skin: Skin is warm, dry and intact. No rash noted. She is not diaphoretic. Psychiatric: She has a normal mood and affect. Her speech is normal and behavior is normal. Judgment and thought content normal. Cognition and memory are normal.   Nursing note and vitals reviewed. Diagnostic Study Results     Labs -  Recent Results (from the past 12 hour(s))   INFLUENZA A & B AG (RAPID TEST)    Collection Time: 03/07/18 12:54 AM   Result Value Ref Range    Influenza A Antigen NEGATIVE  NEG      Influenza B Antigen NEGATIVE  NEG     CBC WITH AUTOMATED DIFF    Collection Time: 03/07/18  1:00 AM   Result Value Ref Range    WBC 10.2 4.6 - 13.2 K/uL    RBC 4.31 4.20 - 5.30 M/uL    HGB 11.7 (L) 12.0 - 16.0 g/dL    HCT 36.4 35.0 - 45.0 %    MCV 84.5 74.0 - 97.0 FL    MCH 27.1 24.0 - 34.0 PG    MCHC 32.1 31.0 - 37.0 g/dL    RDW 16.1 (H) 11.6 - 14.5 %    PLATELET 950 152 - 560 K/uL    MPV 9.7 9.2 - 11.8 FL    NEUTROPHILS 50 40 - 73 %    LYMPHOCYTES 34 21 - 52 %    MONOCYTES 9 3 - 10 %    EOSINOPHILS 6 (H) 0 - 5 %    BASOPHILS 1 0 - 2 %    ABS. NEUTROPHILS 5.1 1.8 - 8.0 K/UL    ABS. LYMPHOCYTES 3.5 0.9 - 3.6 K/UL    ABS. MONOCYTES 0.9 0.05 - 1.2 K/UL    ABS. EOSINOPHILS 0.6 (H) 0.0 - 0.4 K/UL    ABS.  BASOPHILS 0.1 (H) 0.0 - 0.06 K/UL    DF AUTOMATED     METABOLIC PANEL, COMPREHENSIVE    Collection Time: 03/07/18  1:00 AM   Result Value Ref Range    Sodium 142 136 - 145 mmol/L    Potassium 3.4 (L) 3.5 - 5.5 mmol/L    Chloride 106 100 - 108 mmol/L    CO2 29 21 - 32 mmol/L    Anion gap 7 3.0 - 18 mmol/L    Glucose 87 74 - 99 mg/dL    BUN 7 7.0 - 18 MG/DL    Creatinine 0.59 (L) 0.6 - 1.3 MG/DL    BUN/Creatinine ratio 12 12 - 20      GFR est AA >60 >60 ml/min/1.73m2    GFR est non-AA >60 >60 ml/min/1.73m2    Calcium 9.0 8.5 - 10.1 MG/DL    Bilirubin, total 0.3 0.2 - 1.0 MG/DL    ALT (SGPT) 30 13 - 56 U/L    AST (SGOT) 18 15 - 37 U/L    Alk. phosphatase 79 45 - 117 U/L    Protein, total 6.6 6.4 - 8.2 g/dL    Albumin 3.4 3.4 - 5.0 g/dL    Globulin 3.2 2.0 - 4.0 g/dL    A-G Ratio 1.1 0.8 - 1.7     CARDIAC PANEL,(CK, CKMB & TROPONIN)    Collection Time: 03/07/18  1:00 AM   Result Value Ref Range    CK 77 26 - 192 U/L    CK - MB <1.0 <3.6 ng/ml    CK-MB Index  0.0 - 4.0 %     CALCULATION NOT PERFORMED WHEN RESULT IS BELOW LINEAR LIMIT    Troponin-I, Qt. <0.02 0.00 - 0.06 NG/ML       Radiologic Studies -   XR CHEST PORT    (Results Pending)   Prelim read by Dr Alvarez Earing: Chest X-Ray showed No acute process. Medical Decision Making   I am the first provider for this patient. I reviewed the vital signs, available nursing notes, past medical history, past surgical history, family history and social history. Vital Signs-Reviewed the patient's vital signs. Records Reviewed: Nursing Notes (Time of Review: 12:52 AM)    ED Course: Progress Notes, Reevaluation, and Consults:  Labs essentially normal. Cardiac enzymes are negative. Influenza is negative. Chest X-Ray showed No acute process. EKG showed Sinus rhythm  with rate of 69 bpm. With no ST elevations or depression and non specific T wave changes. 12:52 AM 3/7/2018    Provider Notes (Medical Decision Making):  MDM  Number of Diagnoses or Management Options  Chest pain, unspecified type:   Viral illness:   Diagnosis management comments: Viral syndrome   Chest pain   ACS  Infection   Neoplasm       Diagnosis       I have reassessed the patient. Patient is feeling better. Patient will be prescribed Ultram and Tamiflu. Patient was discharged in stable condition. Patient is to return to emergency department if any new or worsening condition. Clinical Impression:   1. Chest pain, unspecified type    2.  Viral illness Disposition: d/c    Follow-up Information     Follow up With Details Comments [address], [place] Call in 2 days For Follow Up [address] [facility]  [phone]      [facility] EMERGENCY DEPT Go to As needed, If symptoms worsen [address] [facility]  [phone]           _______________________________    Attestations:  Scribe Attestation     Xavi Velez acting as a scribe for and in the presence of Norberto Bush DO      March 07, 2018 at 12:52 AM       Provider Attestation:      I personally performed the services described in the documentation, reviewed the documentation, as recorded by the scribe in my presence, and it accurately and completely records my words and actions.  March 07, 2018 at 12:52 AM - Norberto Bush DO    _______________________________

## 2018-03-07 NOTE — LETTER
[facility] EMERGENCY DEPT 
[address] [facility] [id number] [phone] Work/School Note Date: 3/7/2018 To Whom It May concern: 
 
Svitlana Celestin was seen and treated today in the emergency room by the following provider(s): 
Attending Provider: Nuria Abad may return to work on 3/10/18. Sincerely, 
 
 
 
 
López Sommers

## 2018-03-07 NOTE — ED NOTES
Reports reduction of pain after medication and fluids. I have reviewed discharge instructions with the patient. The patient verbalized understanding. Ambulatory from ED. Patient armband removed and shredded.

## 2018-10-11 ENCOUNTER — HOSPITAL ENCOUNTER (OUTPATIENT)
Dept: GENERAL RADIOLOGY | Age: 33
Discharge: HOME | End: 2018-10-11
Payer: COMMERCIAL

## 2018-10-11 DIAGNOSIS — M25.561 RIGHT KNEE PAIN: ICD-10-CM

## 2018-10-11 DIAGNOSIS — D72.829 LEUKOCYTOSIS: ICD-10-CM

## 2018-10-11 PROCEDURE — 71046 X-RAY EXAM CHEST 2 VIEWS: CPT

## 2018-10-11 PROCEDURE — 73562 X-RAY EXAM OF KNEE 3: CPT

## 2018-12-04 ENCOUNTER — DOCUMENTATION ONLY (OUTPATIENT)
Dept: SURGERY | Age: 33
End: 2018-12-04

## 2018-12-04 NOTE — LETTER
Ramona Hogan [facility] [place] [facility] 
 
 
Dear Patient, Your health is our main concern. It is important for your health to have follow-up lab work and to see you surgeon at 2 months, 4 months, 6 months, 9 months and annually after your weight loss surgery. Additionally, the Department of Bariatric Surgery at our hospital is a member of the [employer] [address] Surgical Quality Improvement Program ([facility] NSQIP). As a participant in this program, we gather information on the outcomes of our patients after surgery. Please call the office for a follow up appointment at [phone]. If you have moved out of the area or have changed surgeons please call us and let us know the name of your doctor. Your health and feedback are important to us. We greatly appreciate your response. Thank you, Ramona Hogan [facility]

## 2018-12-04 NOTE — PROGRESS NOTES
Per [facility] requirements;  E-mail and letter sent for follow up appointment. [address] [facility] [place]  [address] [place] [address] [facility]  [facility]      Dear Patient,    Your health is our main concern. It is important for your health to have follow-up lab work and to see you surgeon at 2 months, 4 months, 6 months, 9 months and annually after your weight loss surgery. Additionally, the Department of Bariatric Surgery at our hospital is a member of the [employer] [address] Surgical Quality Improvement Program ([facility] NSQIP). As a participant in this program, we gather information on the outcomes of our patients after surgery. Please call the office for a follow up appointment at [phone]. If you have moved out of the area or have changed surgeons please call us and let us know the name of your doctor. Your health and feedback are important to us. We greatly appreciate your response.        Thank you,  3 [facility]

## 2019-06-17 ENCOUNTER — APPOINTMENT (OUTPATIENT)
Dept: GENERAL RADIOLOGY | Age: 34
End: 2019-06-17
Attending: EMERGENCY MEDICINE
Payer: COMMERCIAL

## 2019-06-17 ENCOUNTER — HOSPITAL ENCOUNTER (EMERGENCY)
Age: 34
Discharge: HOME | End: 2019-06-17
Attending: EMERGENCY MEDICINE
Payer: COMMERCIAL

## 2019-06-17 VITALS
BODY MASS INDEX: 29.78 KG/M2 | TEMPERATURE: 97.8 F | DIASTOLIC BLOOD PRESSURE: 59 MMHG | SYSTOLIC BLOOD PRESSURE: 118 MMHG | OXYGEN SATURATION: 98 % | RESPIRATION RATE: 16 BRPM | HEIGHT: 70 IN | WEIGHT: 208 LBS | HEART RATE: 70 BPM

## 2019-06-17 DIAGNOSIS — M79.652 PAIN OF LEFT THIGH: ICD-10-CM

## 2019-06-17 DIAGNOSIS — M25.552 PAIN OF LEFT HIP JOINT: Primary | ICD-10-CM

## 2019-06-17 LAB
BASOPHILS # BLD: 0.1 K/UL (ref 0–0.1)
BASOPHILS NFR BLD: 0 % (ref 0–2)
CRP SERPL-MCNC: 0.5 MG/DL (ref 0–0.3)
DIFFERENTIAL METHOD BLD: ABNORMAL
EOSINOPHIL # BLD: 0.4 K/UL (ref 0–0.4)
EOSINOPHIL NFR BLD: 3 % (ref 0–5)
ERYTHROCYTE [DISTWIDTH] IN BLOOD BY AUTOMATED COUNT: 13.7 % (ref 11.6–14.5)
ERYTHROCYTE [SEDIMENTATION RATE] IN BLOOD: 11 MM/HR (ref 0–20)
HCT VFR BLD AUTO: 35.7 % (ref 35–45)
HGB BLD-MCNC: 11.9 G/DL (ref 12–16)
LYMPHOCYTES # BLD: 2.7 K/UL (ref 0.9–3.6)
LYMPHOCYTES NFR BLD: 17 % (ref 21–52)
MCH RBC QN AUTO: 28.7 PG (ref 24–34)
MCHC RBC AUTO-ENTMCNC: 33.3 G/DL (ref 31–37)
MCV RBC AUTO: 86 FL (ref 74–97)
MONOCYTES # BLD: 1.2 K/UL (ref 0.05–1.2)
MONOCYTES NFR BLD: 8 % (ref 3–10)
NEUTS SEG # BLD: 11.1 K/UL (ref 1.8–8)
NEUTS SEG NFR BLD: 72 % (ref 40–73)
PLATELET # BLD AUTO: 284 K/UL (ref 135–420)
PMV BLD AUTO: 9.4 FL (ref 9.2–11.8)
RBC # BLD AUTO: 4.15 M/UL (ref 4.2–5.3)
WBC # BLD AUTO: 15.5 K/UL (ref 4.6–13.2)

## 2019-06-17 PROCEDURE — 99282 EMERGENCY DEPT VISIT SF MDM: CPT

## 2019-06-17 PROCEDURE — 86140 C-REACTIVE PROTEIN: CPT

## 2019-06-17 PROCEDURE — 96372 THER/PROPH/DIAG INJ SC/IM: CPT

## 2019-06-17 PROCEDURE — 73502 X-RAY EXAM HIP UNI 2-3 VIEWS: CPT

## 2019-06-17 PROCEDURE — 85652 RBC SED RATE AUTOMATED: CPT

## 2019-06-17 PROCEDURE — 85025 COMPLETE CBC W/AUTO DIFF WBC: CPT

## 2019-06-17 PROCEDURE — 74011250636 HC RX REV CODE- 250/636: Performed by: EMERGENCY MEDICINE

## 2019-06-17 RX ORDER — HYDROCODONE BITARTRATE AND ACETAMINOPHEN 5; 325 MG/1; MG/1
1 TABLET ORAL
Qty: 12 TAB | Refills: 0 | Status: SHIPPED | OUTPATIENT
Start: 2019-06-17 | End: 2019-06-20

## 2019-06-17 RX ORDER — KETOROLAC TROMETHAMINE 30 MG/ML
15 INJECTION, SOLUTION INTRAMUSCULAR; INTRAVENOUS
Status: COMPLETED | OUTPATIENT
Start: 2019-06-17 | End: 2019-06-17

## 2019-06-17 RX ORDER — ERGOCALCIFEROL 1.25 MG/1
50000 CAPSULE ORAL
COMMUNITY
End: 2019-08-15

## 2019-06-17 RX ORDER — ACETAMINOPHEN 325 MG/1
TABLET ORAL
COMMUNITY
End: 2019-07-05

## 2019-06-17 RX ORDER — CELECOXIB 100 MG/1
100 CAPSULE ORAL 2 TIMES DAILY
Qty: 20 CAP | Refills: 0 | Status: SHIPPED | OUTPATIENT
Start: 2019-06-17 | End: 2019-07-05

## 2019-06-17 RX ADMIN — KETOROLAC TROMETHAMINE 15 MG: 30 INJECTION, SOLUTION INTRAMUSCULAR at 10:01

## 2019-06-17 NOTE — LETTER
Work Note June 17, 2019: To Whom It May concern: 
 
Suhas Greer was seen and evaluated today in the emergency room for an acute injury/pain. Suhas Greer may return to work on 6/19/19. Duty restrictions as follows: limited walking, weight-bearing on left hip and leg next 3-7 days as tolerated. Sincerely, Angelo Kraus MD

## 2019-06-17 NOTE — ED TRIAGE NOTES
Patient c/o sharp pain from upper posterior thigh that radiates down leg. Pain started Saturday. Patient denies any injury or heavy lifting. She states she was in bed when it started.

## 2019-06-17 NOTE — ED PROVIDER NOTES
57-year-old female presents for evaluation of atraumatic left hip pain. Patient first noted the onset of pain 2 days ago. It has subsequently come severe to the point that she is moaning and groaning while while lying on the stretcher. She has prior history of low back pain and sciatica but states that this pain is distinctly different. It is located primarily in the lateral aspect of the left hip, radiating down the lateral aspect of the left thigh. She has no associated lumbar pain with this. There are no urinary symptoms. She denies abdominal pain. She does not have sickle cell disease, use IV drugs, or have other risk factors first septic joint. No fever.              Past Medical History:   Diagnosis Date    Arthritic-like pain     knees and elbows    Chronic back pain     has required steroid injections    Diabetes ([address]     Dx 2005    Endometrial polyp     Irregular menses     Morbid obesity ([address]     Morbid obesity with body mass index of 40.0-49.9 (HCC)     PCOS (polycystic ovarian syndrome)     Endometriosis    Psychiatric disorder     depression       Past Surgical History:   Procedure Laterality Date    HX GI      HX GYN  03/31/2017    D&C         Family History:   Problem Relation Age of Onset    Diabetes Maternal Aunt     Diabetes Paternal Grandmother     Diabetes Paternal Grandfather     Diabetes Mother        Social History     Socioeconomic History    Marital status:      Spouse name: Not on file    Number of children: Not on file    Years of education: Not on file    Highest education level: Not on file   Occupational History    Not on file   Social Needs    Financial resource strain: Not on file    Food insecurity:     Worry: Not on file     Inability: Not on file    Transportation needs:     Medical: Not on file     Non-medical: Not on file   Tobacco Use    Smoking status: Never Smoker    Smokeless tobacco: Never Used   Substance and Sexual Activity    Alcohol use: No     Alcohol/week: 0.6 oz     Types: 1 Shots of liquor per week    Drug use: No    Sexual activity: Yes     Partners: Male     Birth control/protection: None   Lifestyle    Physical activity:     Days per week: Not on file     Minutes per session: Not on file    Stress: Not on file   Relationships    Social connections:     Talks on phone: Not on file     Gets together: Not on file     Attends Church service: Not on file     Active member of club or organization: Not on file     Attends meetings of clubs or organizations: Not on file     Relationship status: Not on file    Intimate partner violence:     Fear of current or ex partner: Not on file     Emotionally abused: Not on file     Physically abused: Not on file     Forced sexual activity: Not on file   Other Topics Concern    Not on file   Social History Narrative    Not on file         ALLERGIES: Percocet [oxycodone-acetaminophen]    Review of Systems   Constitutional: Negative for fever. Musculoskeletal: Positive for arthralgias and gait problem. Negative for back pain and neck pain. All other systems reviewed and are negative. Vitals:    06/17/19 0902   BP: 113/68   Pulse: 86   Resp: 18   Temp: 98.4 °F (36.9 °C)   SpO2: 100%   Weight: 94.3 kg (208 lb)   Height: 5' 10\" (1.778 m)            Physical Exam   Constitutional: She is oriented to person, place, and time. She appears well-developed and well-nourished. No distress. HENT:   Head: Normocephalic and atraumatic. Eyes: No scleral icterus. Cardiovascular: Normal rate. Pulmonary/Chest: Effort normal.   Musculoskeletal:   Patient has tenderness to palpation lateral aspect left hip this is exacerbated with flexion, internal, or external rotation. No deformity or appreciable swelling is noted. There is no tenderness over the lumbar spine or over the posterior pelvis and buttock. The knee and ankle are not involved.   There is no calf tenderness and no appreciable lower limb swelling. Distal pulses are 2+ and symmetric. Neurological: She is alert and oriented to person, place, and time. Skin: Skin is warm and dry. Psychiatric: She has a normal mood and affect. Nursing note and vitals reviewed. Recent Results (from the past 12 hour(s))   SED RATE (ESR)    Collection Time: 06/17/19 10:05 AM   Result Value Ref Range    Sed rate, automated 11 0 - 20 mm/hr   CBC WITH AUTOMATED DIFF    Collection Time: 06/17/19 10:05 AM   Result Value Ref Range    WBC 15.5 (H) 4.6 - 13.2 K/uL    RBC 4.15 (L) 4.20 - 5.30 M/uL    HGB 11.9 (L) 12.0 - 16.0 g/dL    HCT 35.7 35.0 - 45.0 %    MCV 86.0 74.0 - 97.0 FL    MCH 28.7 24.0 - 34.0 PG    MCHC 33.3 31.0 - 37.0 g/dL    RDW 13.7 11.6 - 14.5 %    PLATELET 533 547 - 391 K/uL    MPV 9.4 9.2 - 11.8 FL    NEUTROPHILS 72 40 - 73 %    LYMPHOCYTES 17 (L) 21 - 52 %    MONOCYTES 8 3 - 10 %    EOSINOPHILS 3 0 - 5 %    BASOPHILS 0 0 - 2 %    ABS. NEUTROPHILS 11.1 (H) 1.8 - 8.0 K/UL    ABS. LYMPHOCYTES 2.7 0.9 - 3.6 K/UL    ABS. MONOCYTES 1.2 0.05 - 1.2 K/UL    ABS. EOSINOPHILS 0.4 0.0 - 0.4 K/UL    ABS. BASOPHILS 0.1 0.0 - 0.1 K/UL    DF AUTOMATED       XR HIP LT W OR WO PELV 2-3 VWS   Final Result   IMPRESSION:      Normal left hip and pelvis. MDM  Number of Diagnoses or Management Options  Pain of left hip joint:   Pain of left thigh:   Diagnosis management comments: Impression: 22-year-old with atraumatic left hip pain. Pain is clearly exacerbated with movement rotation of the hip. She does not have risk factors for septic hip. Screening labs were obtained. X-ray obtained. Medicated with Toradol IM. Consider acute bursitis, arthritic pain of the hip, tenosynovitis, or other musculoskeletal condition. Doubt this pain is referred from the lumbar spine in the absence of any low back pain    1:27 PM  Patient reports good improvement of pain with Toradol.   Patient's sedimentation rate is normal although she does demonstrate a elevation in the white blood cell count. No effusion is noted on the hip x-ray. She has no risk factors for septic joint. Overall the appears unlikely that this is related to underlying infection of the hip. She is not febrile. Is more likely related to bursitis or tenosynovitis involving the hip. Patient has no medial thigh tenderness and no swelling of the distal lower extremity and her exam is not consistent with acute DVT currently. We will continue symptomatic treatment at home. Precautions for both septic hip and DVT were reviewed with the patient prior to discharge. Will prescribe Celebrex given patient's history of prior gastric sleeve procedure. This will be used only in the short-term. Procedures    Diagnosis:   1. Pain of left hip joint    2. Pain of left thigh      1.  X-ray of the hip reveals no acute bony abnormalities. 2.  The white blood cell count is mildly elevated today although the sedimentation rate is normal.  If this does not completely rule out the possibility of an infection in the hip. 3.  Return to the emergency department for fever redness worsening swelling of the hip itself. 4.  Follow-up with primary care physician for recheck in 1 week if not improved. 5.  Return for recheck should the leg itself become swollen or he develop pain and tenderness along the medial aspect of the thigh and calf. Current exam does not suggest underlying lower extremity blood clot.       Disposition: home    Follow-up Information     Follow up With Specialties Details Why Contact Info    Yany Adair MD Family Practice In 1 week As needed, If symptoms persist [address] [facility] [phone] [facility] EMERGENCY DEPT Emergency Medicine  If symptoms worsen [address] [facility]  [phone]          Patient's Medications   Start Taking    CELECOXIB (CELEBREX) 100 MG CAPSULE    Take 1 Cap by mouth two (2) times a day. HYDROCODONE-ACETAMINOPHEN (NORCO) 5-325 MG PER TABLET    Take 1 Tab by mouth every four (4) hours as needed for Pain for up to 3 days. Max Daily Amount: 6 Tabs. Continue Taking    ACETAMINOPHEN (TYLENOL) 325 MG TABLET    Take  by mouth every four (4) hours as needed for Pain. CITALOPRAM (CELEXA) 20 MG TABLET    Take 20 mg by mouth daily. Indications: ANXIETY WITH DEPRESSION    ERGOCALCIFEROL (VITAMIN D2) 50,000 UNIT CAPSULE    Take 50,000 Units by mouth. ZOLPIDEM TARTRATE (AMBIEN PO)    Take  by mouth. These Medications have changed    No medications on file   Stop Taking    HYDROCODONE-ACETAMINOPHEN (NORCO) 5-325 MG PER TABLET    Take 1 Tab by mouth every four (4) hours as needed for Pain. Max Daily Amount: 6 Tabs. NAPROXEN (NAPROSYN) 500 MG TABLET    Take 1 Tab by mouth two (2) times daily (with meals). ONDANSETRON HCL (ZOFRAN) 4 MG TABLET    Take 1 Tab by mouth every eight (8) hours as needed for Nausea. PEDIATRIC MULTIVITAMIN NO.76 (FLINTSTONES COMPLETE PO)    Take 1 Tab by mouth two (2) times a day. TRAMADOL (ULTRAM) 50 MG TABLET    Take 1 Tab by mouth every six (6) hours as needed for Pain. Max Daily Amount: 200 mg.

## 2019-06-17 NOTE — DISCHARGE INSTRUCTIONS
1.  X-ray of the hip reveals no acute bony abnormalities. 2.  The white blood cell count is mildly elevated today although the sedimentation rate is normal.  If this does not completely rule out the possibility of an infection in the hip. 3.  Return to the emergency department for fever redness worsening swelling of the hip itself. 4.  Follow-up with primary care physician for recheck in 1 week if not improved. 5.  Return for recheck should the leg itself become swollen or he develop pain and tenderness along the medial aspect of the thigh and calf. Current exam does not suggest underlying lower extremity blood clot. Patient Education        Hip Pain: Care Instructions  Your Care Instructions    Hip pain may be caused by many things, including overuse, a fall, or a twisting movement. Another cause of hip pain is arthritis. Your pain may increase when you stand up, walk, or squat. The pain may come and go or may be constant. Home treatment can help relieve hip pain, swelling, and stiffness. If your pain is ongoing, you may need more tests and treatment. Follow-up care is a key part of your treatment and safety. Be sure to make and go to all appointments, and call your doctor if you are having problems. It's also a good idea to know your test results and keep a list of the medicines you take. How can you care for yourself at home? · Take pain medicines exactly as directed. ? If the doctor gave you a prescription medicine for pain, take it as prescribed. ? If you are not taking a prescription pain medicine, ask your doctor if you can take an over-the-counter medicine. · Rest and protect your hip. Take a break from any activity, including standing or walking, that may cause pain. · Put ice or a cold pack against your hip for 10 to 20 minutes at a time. Try to do this every 1 to 2 hours for the next 3 days (when you are awake) or until the swelling goes down.  Put a thin cloth between the ice and your skin.  · Sleep on your healthy side with a pillow between your knees, or sleep on your back with pillows under your knees. · If there is no swelling, you can put moist heat, a heating pad, or a warm cloth on your hip. Do gentle stretching exercises to help keep your hip flexible. · Learn how to prevent falls. Have your vision and hearing checked regularly. Wear slippers or shoes with a nonskid sole. · Stay at a healthy weight. · Wear comfortable shoes. When should you call for help? Call 911 anytime you think you may need emergency care. For example, call if:    · You have sudden chest pain and shortness of breath, or you cough up blood.     · You are not able to stand or walk or bear weight.     · Your buttocks, legs, or feet feel numb or tingly.     · Your leg or foot is cool or pale or changes color.     · You have severe pain.    Call your doctor now or seek immediate medical care if:    · You have signs of infection, such as:  ? Increased pain, swelling, warmth, or redness in the hip area. ? Red streaks leading from the hip area. ? Pus draining from the hip area. ? A fever.     · You have signs of a blood clot, such as:  ? Pain in your calf, back of the knee, thigh, or groin. ? Redness and swelling in your leg or groin.     · You are not able to bend, straighten, or move your leg normally.     · You have trouble urinating or having bowel movements.    Watch closely for changes in your health, and be sure to contact your doctor if:    · You do not get better as expected. Where can you learn more? Go to http://león-irving.info/. Enter Q363 in the search box to learn more about \"Hip Pain: Care Instructions. \"  Current as of: September 23, 2018  Content Version: 11.9  © 1997-2723 [employer]. Care instructions adapted under license by [employer] (which disclaims liability or warranty for this information).  If you have questions about a medical condition or this instruction, always ask your healthcare professional. [place] [address] any warranty or liability for your use of this information.

## 2019-06-17 NOTE — ED NOTES
1:46 PM  06/17/19     Discharge instructions given to patient with verbalization of understanding. Patient accompanied by self. Patient discharged with the following prescriptions Celebrex and Norco. Patient discharged to home.       Alba Henson RN

## 2019-08-15 ENCOUNTER — APPOINTMENT (OUTPATIENT)
Dept: ULTRASOUND IMAGING | Age: 34
End: 2019-08-15
Attending: PHYSICIAN ASSISTANT
Payer: COMMERCIAL

## 2019-08-15 ENCOUNTER — HOSPITAL ENCOUNTER (EMERGENCY)
Age: 34
Discharge: HOME | End: 2019-08-15
Attending: EMERGENCY MEDICINE
Payer: COMMERCIAL

## 2019-08-15 VITALS
SYSTOLIC BLOOD PRESSURE: 114 MMHG | RESPIRATION RATE: 16 BRPM | OXYGEN SATURATION: 98 % | TEMPERATURE: 98.5 F | HEIGHT: 70 IN | WEIGHT: 224 LBS | BODY MASS INDEX: 32.07 KG/M2 | DIASTOLIC BLOOD PRESSURE: 73 MMHG | HEART RATE: 82 BPM

## 2019-08-15 DIAGNOSIS — O26.899 ABDOMINAL PAIN DURING PREGNANCY: ICD-10-CM

## 2019-08-15 DIAGNOSIS — R10.9 ABDOMINAL PAIN IN PREGNANCY, SECOND TRIMESTER: Primary | ICD-10-CM

## 2019-08-15 DIAGNOSIS — R10.9 ABDOMINAL PAIN DURING PREGNANCY: ICD-10-CM

## 2019-08-15 DIAGNOSIS — O26.892 ABDOMINAL PAIN IN PREGNANCY, SECOND TRIMESTER: Primary | ICD-10-CM

## 2019-08-15 LAB
APPEARANCE UR: CLEAR
BASOPHILS # BLD: 0 K/UL (ref 0–0.1)
BASOPHILS NFR BLD: 0 % (ref 0–2)
BILIRUB UR QL: NEGATIVE
COLOR UR: YELLOW
DIFFERENTIAL METHOD BLD: ABNORMAL
EOSINOPHIL # BLD: 0.6 K/UL (ref 0–0.4)
EOSINOPHIL NFR BLD: 4 % (ref 0–5)
ERYTHROCYTE [DISTWIDTH] IN BLOOD BY AUTOMATED COUNT: 14.1 % (ref 11.6–14.5)
GLUCOSE UR STRIP.AUTO-MCNC: NEGATIVE MG/DL
HCT VFR BLD AUTO: 33.1 % (ref 35–45)
HGB BLD-MCNC: 11.2 G/DL (ref 12–16)
HGB UR QL STRIP: NEGATIVE
KETONES UR QL STRIP.AUTO: ABNORMAL MG/DL
LEUKOCYTE ESTERASE UR QL STRIP.AUTO: NEGATIVE
LYMPHOCYTES # BLD: 3 K/UL (ref 0.9–3.6)
LYMPHOCYTES NFR BLD: 21 % (ref 21–52)
MCH RBC QN AUTO: 29.9 PG (ref 24–34)
MCHC RBC AUTO-ENTMCNC: 33.8 G/DL (ref 31–37)
MCV RBC AUTO: 88.3 FL (ref 74–97)
MONOCYTES # BLD: 1.2 K/UL (ref 0.05–1.2)
MONOCYTES NFR BLD: 9 % (ref 3–10)
NEUTS SEG # BLD: 9.5 K/UL (ref 1.8–8)
NEUTS SEG NFR BLD: 66 % (ref 40–73)
NITRITE UR QL STRIP.AUTO: NEGATIVE
PH UR STRIP: 6 [PH] (ref 5–8)
PLATELET # BLD AUTO: 243 K/UL (ref 135–420)
PMV BLD AUTO: 9.1 FL (ref 9.2–11.8)
PROT UR STRIP-MCNC: NEGATIVE MG/DL
RBC # BLD AUTO: 3.75 M/UL (ref 4.2–5.3)
SP GR UR REFRACTOMETRY: 1.02 (ref 1–1.03)
UROBILINOGEN UR QL STRIP.AUTO: 1 EU/DL (ref 0.2–1)
WBC # BLD AUTO: 14.3 K/UL (ref 4.6–13.2)

## 2019-08-15 PROCEDURE — 85025 COMPLETE CBC W/AUTO DIFF WBC: CPT

## 2019-08-15 PROCEDURE — 76805 OB US >/= 14 WKS SNGL FETUS: CPT

## 2019-08-15 PROCEDURE — 76815 OB US LIMITED FETUS(S): CPT

## 2019-08-15 PROCEDURE — 81003 URINALYSIS AUTO W/O SCOPE: CPT

## 2019-08-15 PROCEDURE — 99283 EMERGENCY DEPT VISIT LOW MDM: CPT

## 2019-08-15 NOTE — ED TRIAGE NOTES
Patient states that she is 17 weeks pregnant. She denies vaginal bleeding or discharge. She c/o left lower pelvic pain. States pain increases with laying on left side. States hx of endometriosis and ovarian cysts.

## 2019-08-15 NOTE — DISCHARGE INSTRUCTIONS
Patient Education     Belly Pain in Pregnancy: Care Instructions  Your Care Instructions  When you're pregnant, any belly pain can be a worry. You may not want to call your doctor about every pain you have. But you don't want to miss something that is dangerous for you or your baby. Even if it feels familiar, belly pain can mean something new when you're pregnant. It's important to know when to call your doctor. It will also help to know how to care for yourself at home when your pain is not caused by anything harmful. · When belly pain is more severe or constant, see a doctor right away. · If you're sure your belly pain is a sign of labor, call your doctor. · When belly pain is brief, it's usually a normal part of pregnancy. It might be related to changes in the growing uterus. Or it could be the stretching of ligaments called round ligaments. These ligaments help support the uterus. Round ligament pain can be on either side of your belly. It can also be felt in your hips or groin. Follow-up care is a key part of your treatment and safety. Be sure to make and go to all appointments, and call your doctor if you are having problems. It's also a good idea to know your test results and keep a list of the medicines you take. How can you tell if belly pain is a sign of labor? When belly pain is caused by labor, it can feel like mild or menstrual-like cramps in your lower belly. These cramps are probably contractions. They can happen in your second or third trimester. You may also have:  · A steady, dull ache in your lower back, pelvis, or thighs. · A feeling of pressure in your pelvis or lower belly. · Changes in your vaginal discharge or a sudden release of fluid from the vagina. If you think you are in labor, call your doctor. How can you care for yourself at home? When belly pain is mild and is not a symptom of labor:  · Rest until you feel better. · Take a warm bath.   · Think about what you drink and eat:  ¨ Drink plenty of fluids. Choose water and other caffeine-free clear liquids until you feel better. ¨ Try eating small, frequent meals. If your stomach is upset, try bland, low-fat foods like plain rice, broiled chicken, toast, and yogurt. · Think about how you move if you are having brief pains from stretching of the round ligaments. ¨ Try gentle stretching. ¨ Move a little more slowly when turning in bed or getting up from a chair, so those ligaments don't stretch quickly. ¨ Lean forward a bit if you think you are going to cough or sneeze. When should you call for help? Call 911 anytime you think you may need emergency care. For example, call if:  · You have sudden, severe pain in your belly. · You have severe vaginal bleeding. Call your doctor now or seek immediate medical care if:  · You have new or worse belly pain or cramping. · You have any vaginal bleeding. · You have a fever. · You have symptoms of preeclampsia, such as:  ¨ Sudden swelling of your face, hands, or feet. ¨ New vision problems (such as dimness or blurring). ¨ A severe headache. · You think that you may be in labor. This means that you've had at least 8 contractions within 1 hour or at least 4 contractions within 20 minutes, even after you change your position and drink fluids. · You have symptoms of a urinary tract infection. These may include:  ¨ Pain or burning when you urinate. ¨ A frequent need to urinate without being able to pass much urine. ¨ Pain in the flank, which is just below the rib cage and above the waist on either side of the back. ¨ Blood in your urine. Watch closely for changes in your health, and be sure to contact your doctor if you are worried about your or your baby's health. Where can you learn more? Go to Egalet.be  Enter B275 in the search box to learn more about \"Belly Pain in Pregnancy: Care Instructions. \"   © 4250-0603 [employer].  Care instructions adapted under license by [employer] (which disclaims liability or warranty for this information). This care instruction is for use with your licensed healthcare professional. If you have questions about a medical condition or this instruction, always ask your healthcare professional. [address] any warranty or liability for your use of this information.   Content Version: 91.6.536663; Current as of: November 12, 2015

## 2019-08-15 NOTE — ED PROVIDER NOTES
EMERGENCY DEPARTMENT HISTORY AND PHYSICAL EXAM    Date: 8/15/2019  Patient Name: Shana Archer    History of Presenting Illness     Chief Complaint   Patient presents with    Pelvic Pain    Pregnancy         History Provided By: Patient        Additional History (Context): Shana Archer is a 37 y.o.   17 weeks gravid female who presents with a complaint of intermittent mild to moderate, 4/10 left lower quadrant pain without nausea, vomiting, diarrhea or dysuria. Patient states this left lower quadrant pain started yesterday. Patient denies vaginal bleeding, vaginal discharge, hematuria or flank pain. Patient states fetal movement started today she describes this as a fluttering. Patient is followed by OB/GYN. Patient denies fever, myalgias or chills. Denies a history of renal calculi. PCP: Landy Gage MD    Current Outpatient Medications   Medication Sig Dispense Refill    PNV66-Iron Fumarate-FA-DSS-DHA 26-1.2- mg cap Take  by mouth.          Past History     Past Medical History:  Past Medical History:   Diagnosis Date    Arthritic-like pain     knees and elbows    Chronic back pain     has required steroid injections    Diabetes ([facility] [address]     Dx 2005    Endometrial polyp     Irregular menses     Morbid obesity ([facility] [address]     Morbid obesity with body mass index of 40.0-49.9 (HCC)     PCOS (polycystic ovarian syndrome)     Endometriosis    Psychiatric disorder     depression       Past Surgical History:  Past Surgical History:   Procedure Laterality Date    HX GI      HX GYN  2017    D&C       Family History:  Family History   Problem Relation Age of Onset    Diabetes Maternal Aunt     Diabetes Paternal Grandmother     Diabetes Paternal Grandfather     Diabetes Mother        Social History:  Social History     Tobacco Use    Smoking status: Never Smoker    Smokeless tobacco: Never Used   Substance Use Topics    Alcohol use: No     Alcohol/week: 1.0 standard drinks     Types: 1 Shots of liquor per week    Drug use: No       Allergies: Allergies   Allergen Reactions    Percocet [Oxycodone-Acetaminophen] Nausea and Vomiting    Tramadol Hives         Review of Systems   Review of Systems  Review of Systems   Constitutional: Negative for fatigue and fever. HENT: Negative for congestion. Respiratory: Negative for cough and shortness of breath. Cardiovascular: Negative for chest pain. Gastrointestinal: LLQ abdominal pain, without diarrhea, nausea and vomiting. Genitourinary: Negative for difficulty urinating and dysuria. Musculoskeletal: Negative joint pain, joint swelling, recent injury. Skin: Negative for wound. Neurological: Negative for dizziness and headaches. All other systems reviewed and are negative. All Other Systems Negative  Physical Exam     Vitals:    08/15/19 1559   BP: 114/73   Pulse: 82   Resp: 16   Temp: 98.5 °F (36.9 °C)   SpO2: 98%   Weight: 101.6 kg (224 lb)   Height: 5' 10\" (1.778 m)     Physical Exam     Constitutional: Pt is oriented to person, place, and time. Pt appears well-developed and well-nourished. HENT:   Head: Normocephalic and atraumatic. Mouth/Throat: Oropharynx is clear and moist.   Eyes: Pupils are equal, round, and reactive to light. Neck: Normal range of motion. Neck supple. Cardiovascular: Normal rate, regular rhythm and normal heart sounds. No murmur heard. Pulmonary/Chest: Effort normal and breath sounds normal. No respiratory distress. No wheezes or rales. Abdominal: Soft but gravid, There is no tenderness. There is no rebound and no guarding. No CVA tenderness  Musculoskeletal: Normal range of motion. No edema or deformity. No pedal edema  Neurological: Pt is alert and oriented to person, place, and time   Skin: Skin is warm and dry.    Psychiatric: Pt has a normal mood and affect;  behavior is normal. Judgment and thought content normal.           Diagnostic Study Results     Labs -     Recent Results (from the past 12 hour(s))   CBC WITH AUTOMATED DIFF    Collection Time: 08/15/19  4:35 PM   Result Value Ref Range    WBC 14.3 (H) 4.6 - 13.2 K/uL    RBC 3.75 (L) 4.20 - 5.30 M/uL    HGB 11.2 (L) 12.0 - 16.0 g/dL    HCT 33.1 (L) 35.0 - 45.0 %    MCV 88.3 74.0 - 97.0 FL    MCH 29.9 24.0 - 34.0 PG    MCHC 33.8 31.0 - 37.0 g/dL    RDW 14.1 11.6 - 14.5 %    PLATELET 565 719 - 330 K/uL    MPV 9.1 (L) 9.2 - 11.8 FL    NEUTROPHILS 66 40 - 73 %    LYMPHOCYTES 21 21 - 52 %    MONOCYTES 9 3 - 10 %    EOSINOPHILS 4 0 - 5 %    BASOPHILS 0 0 - 2 %    ABS. NEUTROPHILS 9.5 (H) 1.8 - 8.0 K/UL    ABS. LYMPHOCYTES 3.0 0.9 - 3.6 K/UL    ABS. MONOCYTES 1.2 0.05 - 1.2 K/UL    ABS. EOSINOPHILS 0.6 (H) 0.0 - 0.4 K/UL    ABS. BASOPHILS 0.0 0.0 - 0.1 K/UL    DF AUTOMATED     URINALYSIS W/ RFLX MICROSCOPIC    Collection Time: 08/15/19  4:35 PM   Result Value Ref Range    Color YELLOW      Appearance CLEAR      Specific gravity 1.020 1.005 - 1.030      pH (UA) 6.0 5.0 - 8.0      Protein NEGATIVE  NEG mg/dL    Glucose NEGATIVE  NEG mg/dL    Ketone TRACE (A) NEG mg/dL    Bilirubin NEGATIVE  NEG      Blood NEGATIVE  NEG      Urobilinogen 1.0 0.2 - 1.0 EU/dL    Nitrites NEGATIVE  NEG      Leukocyte Esterase NEGATIVE  NEG         Radiologic Studies -   US PREG UTS > 14 WKS SNGL   Final Result   IMPRESSION:     1. Single live intrauterine pregnancy with average ultrasound age of 12 weeks   and 5 days. 2.  No complications identified. CT Results  (Last 48 hours)    None        CXR Results  (Last 48 hours)    None            Medical Decision Making   I am the first provider for this patient. I reviewed the vital signs, available nursing notes, past medical history, past surgical history, family history and social history. Vital Signs-Reviewed the patient's vital signs.        Comparison:    Records Reviewed: Nursing Notes and Old Medical Records    Procedures:  Procedures    Provider Notes (Medical Decision Making):   Patient is not tender to palpation left lower quadrant or anywhere in the abdominal region. No CVA tenderness. Urine is negative. Other labs are within normal limits or what is expected in pregnancy ei. . mild anemia associated with pregnancy. Patient has Doppler fetal heart tone monitor at home she will continue to monitor for fetal heart tones. Patient is going home but has instructions to return to the emergency department if her abdominal symptoms worsen or continue. Otherwise she will follow-up with OB/GYN next week. MED RECONCILIATION:  No current facility-administered medications for this encounter. Current Outpatient Medications   Medication Sig    PNV66-Iron Fumarate-FA-DSS-DHA 26-1.2- mg cap Take  by mouth. Disposition:  Home    DISCHARGE NOTE:     Pt has been reexamined. Patient has no new complaints, changes, or physical findings. Care plan outlined and precautions discussed. Results of ultrasound were reviewed with the patient. All medications were reviewed with the patient; will d/c home with instructions to follow up with OB. All of pt's questions and concerns were addressed. Patient was instructed and agrees to follow up with OB/Gyn, as well as to return to the ED upon further deterioration. Patient is ready to go home. Follow-up Information     Follow up With Specialties Details Why Contact Info    Jomar Lange MD [address]  [phone]      OB/Gyn        [facility] EMERGENCY DEPT Emergency Medicine  If symptoms worsen [address] [id number] [phone]          Discharge Medication List as of 8/15/2019  5:48 PM              Diagnosis     Clinical Impression:   1.  Abdominal pain in pregnancy, second trimester

## 2019-10-14 ENCOUNTER — HOSPITAL ENCOUNTER (EMERGENCY)
Age: 34
Discharge: HOME | End: 2019-10-14
Attending: EMERGENCY MEDICINE
Payer: COMMERCIAL

## 2019-10-14 VITALS
WEIGHT: 240 LBS | HEIGHT: 70 IN | OXYGEN SATURATION: 100 % | RESPIRATION RATE: 18 BRPM | DIASTOLIC BLOOD PRESSURE: 78 MMHG | BODY MASS INDEX: 34.36 KG/M2 | TEMPERATURE: 98.1 F | SYSTOLIC BLOOD PRESSURE: 137 MMHG | HEART RATE: 86 BPM

## 2019-10-14 DIAGNOSIS — R10.2 PELVIC PAIN AFFECTING PREGNANCY IN THIRD TRIMESTER, ANTEPARTUM: Primary | ICD-10-CM

## 2019-10-14 DIAGNOSIS — N89.8 VAGINAL DISCHARGE: ICD-10-CM

## 2019-10-14 DIAGNOSIS — O26.893 PELVIC PAIN AFFECTING PREGNANCY IN THIRD TRIMESTER, ANTEPARTUM: Primary | ICD-10-CM

## 2019-10-14 PROCEDURE — 99284 EMERGENCY DEPT VISIT MOD MDM: CPT

## 2019-10-14 NOTE — ED NOTES
Patient up for discharge. Discharge results have been reviewed with patient by the Provider, to include to go see Dr Veronica Rogers in his office this morning at 9 am without fail.   Armband removed and shredded per policy. Encouraged to voice any concerns, and all concerns addressed. Patient discharged in stable condition with no apparent distress.

## 2019-10-14 NOTE — ED TRIAGE NOTES
Pt. Complains she was showering for work this morning when a \"large mucous plug\" discharged from her vaginal area. Pt denies any noted blood. Pt states she had also had several cramps like terra maldonado throughout the night. Pt. States she is 26 weeks pregnant.

## 2019-10-14 NOTE — DISCHARGE INSTRUCTIONS
Patient Education     If you were prescribed any medication take as directed. Do not drive or use heavy equipment if prescribed narcotics. Follow up with your primary care physician or with specialist as directed. Return to the emergency room with any new or worsening conditions. Belly Pain in Pregnancy: Care Instructions  Your Care Instructions    When you're pregnant, any belly pain can be a worry. You may not want to call your doctor about every pain you have. But you don't want to miss something that is dangerous for you or your baby. Even if it feels familiar, belly pain can mean something new when you're pregnant. It's important to know when to call your doctor. It will also help to know how to care for yourself at home when your pain is not caused by anything harmful. · When belly pain is more severe or constant, see a doctor right away. · If you're sure your belly pain is a sign of labor, call your doctor. · When belly pain is brief, it's usually a normal part of pregnancy. It might be related to changes in the growing uterus. Or it could be the stretching of ligaments called round ligaments. These ligaments help support the uterus. Round ligament pain can be on either side of your belly. It can also be felt in your hips or groin. Follow-up care is a key part of your treatment and safety. Be sure to make and go to all appointments, and call your doctor if you are having problems. It's also a good idea to know your test results and keep a list of the medicines you take. How can you tell if belly pain is a sign of labor? When belly pain is caused by labor, it can feel like mild or menstrual-like cramps in your lower belly. These cramps are probably contractions. They can happen in your second or third trimester. You may also have:  · A steady, dull ache in your lower back, pelvis, or thighs. · A feeling of pressure in your pelvis or lower belly.   · Changes in your vaginal discharge or a sudden release of fluid from the vagina. If you think you are in labor, call your doctor. How can you care for yourself at home? When belly pain is mild and is not a symptom of labor:  · Rest until you feel better. · Take a warm bath. · Think about what you drink and eat:  ? Drink plenty of fluids. Choose water and other caffeine-free clear liquids until you feel better. ? Try eating small, frequent meals. If your stomach is upset, try bland, low-fat foods like plain rice, broiled chicken, toast, and yogurt. · Think about how you move if you are having brief pains from stretching of the round ligaments. ? Try gentle stretching. ? Move a little more slowly when turning in bed or getting up from a chair, so those ligaments don't stretch quickly. ? Lean forward a bit if you think you are going to cough or sneeze. When should you call for help? Call 911 anytime you think you may need emergency care. For example, call if:    · You have sudden, severe pain in your belly.     · You have severe vaginal bleeding.     · You passed out (lost consciousness).     · You have a seizure.    Call your doctor now or seek immediate medical care if:    · You have new or worse belly pain or cramping.     · You have any vaginal bleeding.     · You have a fever.     · You have symptoms of preeclampsia, such as:  ? Sudden swelling of your face, hands, or feet. ? New vision problems (such as dimness, blurring, or seeing spots). ? A severe headache.     · You think that you may be in labor. This means that you've had at least 8 contractions within 1 hour or at least 4 contractions within 20 minutes, even after you change your position and drink fluids.     · You have symptoms of a urinary tract infection. These may include:  ? Pain or burning when you urinate. ? A frequent need to urinate without being able to pass much urine.   ? Pain in the flank, which is just below the rib cage and above the waist on either side of the back.  ? Blood in your urine.    Watch closely for changes in your health, and be sure to contact your doctor if you are worried about your or your baby's health. Where can you learn more? Go to http://león-irving.info/. Enter 044 403 164 in the search box to learn more about \"Belly Pain in Pregnancy: Care Instructions. \"  Current as of: May 29, 2019  Content Version: 12.2  © 6020-1840 [employer]. Care instructions adapted under license by [employer] (which disclaims liability or warranty for this information). If you have questions about a medical condition or this instruction, always ask your healthcare professional. [place] [address] any warranty or liability for your use of this information.

## 2019-10-14 NOTE — ED NOTES
Patient states she had Liborio Dominguez contractions last week that lasted less then a day. She states \"my stomach just kind of balls up and then releases since last night\". Denies how far apart this happens. Informed to pay attention to time of next contraction and when the next contraction begins and to inform nurse of timing. Patient verbalized understanding of. Provider informed. Denies nausea, vaginal drainage/discharge, or other concerns.

## 2019-10-14 NOTE — ED NOTES
Patient states this is her 2nd pregnancy. Her due date is January 22nd. She states she lost her mucous plug this morning and wants to make sure her baby is OK. Provider informed.

## 2019-10-14 NOTE — ED PROVIDER NOTES
EMERGENCY DEPARTMENT HISTORY AND PHYSICAL EXAM    7:17 AM      Date: 10/14/2019  Patient Name: Marily Lombard    History of Presenting Illness     Chief Complaint   Patient presents with    Pregnancy Problem    Vaginal Discharge         History Provided By: Patient    Additional History (Context): Marily Lombard is a 37 y.o. female with Past medical history of endometrial polyp, irregular menses, depression who presents with chief complaint of concern for possible mucous plug discharge this morning. Patient is a 29 weeks pregnant female. Patient states that when she was getting ready for work and taking a shower she noticed of thick mucus string of discharge from her vagina, and then no further discharge. .  States that on yesterday evening she had some pelvic pressure. Also reports that she had the same pressure once last week. She denies any pelvic pressure or pain today. She also denies any vaginal bleeding or clear fluid discharge from the vagina. There is been no nausea, vomiting, fever, dysuria, flank pain no other complaint. Patient states that her OB/GYN doctor is Dr. Porsha Guzman.     PCP: Amanda Spaulding MD        Past History     Past Medical History:  Past Medical History:   Diagnosis Date    Arthritic-like pain     knees and elbows    Chronic back pain     has required steroid injections    Endometrial polyp     Irregular menses     Morbid obesity ([address]     Morbid obesity with body mass index of 40.0-49.9 ([address]     Psychiatric disorder     depression       Past Surgical History:  Past Surgical History:   Procedure Laterality Date    HX GI      HX GYN  03/31/2017    D&C       Family History:  Family History   Problem Relation Age of Onset    Diabetes Maternal Aunt     Diabetes Paternal Grandmother     Diabetes Paternal Grandfather     Diabetes Mother        Social History:  Social History     Tobacco Use    Smoking status: Never Smoker    Smokeless tobacco: Never Used   Substance Use Topics    Alcohol use: No     Alcohol/week: 1.0 standard drinks     Types: 1 Shots of liquor per week    Drug use: No       Allergies: Allergies   Allergen Reactions    Percocet [Oxycodone-Acetaminophen] Nausea and Vomiting    Tramadol Hives         Review of Systems       Review of Systems   Constitutional: Negative for chills and fever. HENT: Negative for congestion. Respiratory: Negative for cough. Cardiovascular: Negative for chest pain. Gastrointestinal: Negative for nausea and vomiting. Endocrine: Negative for polyuria. Genitourinary: Positive for pelvic pain (Last night but now resolved) and vaginal discharge (Mucous plug discharge). Negative for difficulty urinating, dysuria and flank pain. Musculoskeletal: Negative for arthralgias and neck stiffness. Skin: Negative for pallor and rash. Neurological: Negative for dizziness, weakness, numbness and headaches. Hematological: Does not bruise/bleed easily. Psychiatric/Behavioral: Negative for confusion and dysphoric mood. All other systems reviewed and are negative. Physical Exam     Visit Vitals  /78 (BP 1 Location: Left arm, BP Patient Position: At rest)   Pulse 86   Temp 98.1 °F (36.7 °C)   Resp 18   Ht 5' 10\" (1.778 m)   Wt 108.9 kg (240 lb)   LMP 05/18/2019   SpO2 100%   BMI 34.44 kg/m²         Physical Exam   Constitutional: She is oriented to person, place, and time. She appears well-developed and well-nourished. No distress. HENT:   Head: Normocephalic and atraumatic. Mouth/Throat: Oropharynx is clear and moist.   Eyes: Pupils are equal, round, and reactive to light. Conjunctivae are normal. No scleral icterus. Neck: Normal range of motion. Neck supple. Cardiovascular: Normal rate. Pulmonary/Chest: Effort normal and breath sounds normal. No respiratory distress. Abdominal: Soft. Bowel sounds are normal. She exhibits no distension. There is no tenderness.    Soft gravid abdomen  Fetal heart rate by portable Doppler 147   Musculoskeletal: Normal range of motion. She exhibits no edema. Neurological: She is alert and oriented to person, place, and time. No cranial nerve deficit. Skin: Skin is warm and dry. She is not diaphoretic. Psychiatric: She has a normal mood and affect. Her behavior is normal.   Nursing note and vitals reviewed. Diagnostic Study Results     Labs -  No results found for this or any previous visit (from the past 12 hour(s)). Radiologic Studies -   No orders to display         Medical Decision Making   I am the first provider for this patient. I reviewed the vital signs, available nursing notes, past medical history, past surgical history, family history and social history. Vital Signs-Reviewed the patient's vital signs. Records Reviewed: Nursing Notes and Old Medical Records (Time of Review: 7:17 AM)    Provider Notes (Medical Decision Making): DDX: Contraction, pelvic/abdominal pain in pregnancy resolved, possible mucous plug discharge  MDM    Medications - No data to display        ED Course: Progress Notes, Reevaluation, and Consults:  Page has been sent out to transfer center. \    7:22 AM  Transfer center called back but with incorrect OB/GYN, calling with [facility] instead of Dr. Veronica Rogers who goes to [facility].  She states will call back. 7:41 AM  Dr. Cullen Gan called back after I paged him. He states can just send patient to his office, states she needs to be there at 9:00 AM today. States can discharge patient from ED. I discussed plan with patient. She agrees. I have reassessed the patient. I have discussed the workup, results and plan with the patient and patient is in agreement. Patient is feeling better, there is no pelvic pain or any type of vaginal discharge or vaginal bleeding. Patient was discharge in stable condition. Patient was given outpatient follow up.   Patient is to return to emergency department if any new or worsening condition. Diagnosis     Clinical Impression:   1. Pelvic pain affecting pregnancy in third trimester, antepartum    2. Vaginal discharge        Disposition: Discharged    Follow-up Information     Follow up With Specialties Details Why Contact Info    Eve Mcdonald MD Obstetrics & Gynecology, Gynecology, Obstetrics Go to His office at 9:00 AM today Monday, 10/14/2019, without fail [address] [facility]  [phone]             Patient's Medications   Start Taking    No medications on file   Continue Taking    PNV66-IRON FUMARATE-FA-DSS-DHA 26-1.2- MG CAP    Take  by mouth. These Medications have changed    No medications on file   Stop Taking    No medications on file         Nancy Dave DO    Dragon medical dictation software was used for portions of this report. Unintended transcription errors may occur. My signature above authenticates this document and my orders, the final    diagnosis (es), discharge prescription (s), and instructions in the Epic    record.

## 2019-11-18 ENCOUNTER — DOCUMENTATION ONLY (OUTPATIENT)
Dept: SURGERY | Age: 34
End: 2019-11-18

## 2019-11-18 NOTE — PROGRESS NOTES
Per [facility] requirements;  E-mail and letter sent for follow up appointment. [facility] [place]  [facility]  [facility]      Dear Patient,    Your health is our main concern. It is important for your health to have follow-up lab work and to see you surgeon at 2 months, 4 months, 6 months, 9 months and annually after your weight loss surgery. Additionally, the Department of Bariatric Surgery at our hospital is a member of the [employer] [address] Surgical Quality Improvement Program ([facility] NSQIP). As a participant in this program, we gather information on the outcomes of our patients after surgery. Please call the office for a follow up appointment at [phone]. If you have moved out of the area or have changed surgeons please call us and let us know the name of your doctor. Your health and feedback are important to us. We greatly appreciate your response.        Thank you,  [facility]

## 2019-11-18 NOTE — LETTER
[facility] [place] [facility] 
 
 
Dear Patient, Your health is our main concern. It is important for your health to have follow-up lab work and to see you surgeon at 2 months, 4 months, 6 months, 9 months and annually after your weight loss surgery. Additionally, the Department of Bariatric Surgery at our hospital is a member of the [employer] [address] Surgical Quality Improvement Program ([facility] NSQIP). As a participant in this program, we gather information on the outcomes of our patients after surgery. Please call the office for a follow up appointment at [phone]. If you have moved out of the area or have changed surgeons please call us and let us know the name of your doctor. Your health and feedback are important to us. We greatly appreciate your response. Thank you, [facility] [address]

## 2020-01-15 PROBLEM — Z34.90 TERM PREGNANCY: Status: ACTIVE | Noted: 2020-01-15

## 2021-08-03 PROBLEM — Z34.90 ENCOUNTER FOR ELECTIVE INDUCTION OF LABOR: Status: ACTIVE | Noted: 2021-08-03

## 2021-08-03 PROBLEM — Z34.90 ENCOUNTER FOR ELECTIVE INDUCTION OF LABOR: Status: RESOLVED | Noted: 2021-08-03 | Resolved: 2021-08-03

## 2021-08-03 PROBLEM — Z34.90 TERM PREGNANCY: Status: RESOLVED | Noted: 2020-01-15 | Resolved: 2021-08-03

## 2022-03-18 PROBLEM — Z98.84 S/P LAPAROSCOPIC SLEEVE GASTRECTOMY: Status: ACTIVE | Noted: 2018-01-11

## 2022-03-19 PROBLEM — R10.32 LLQ ABDOMINAL PAIN: Status: ACTIVE | Noted: 2018-01-11

## 2022-03-19 PROBLEM — G89.18 POST-OPERATIVE PAIN: Status: ACTIVE | Noted: 2018-01-11

## 2022-03-19 PROBLEM — E66.01 SEVERE OBESITY WITH BODY MASS INDEX (BMI) OF 35.0 TO 39.9 WITH COMORBIDITY (HCC): Status: ACTIVE | Noted: 2017-12-14

## 2022-03-20 PROBLEM — K90.9 INTESTINAL MALABSORPTION: Status: ACTIVE | Noted: 2018-01-11

## 2022-08-25 ENCOUNTER — HOSPITAL ENCOUNTER (EMERGENCY)
Age: 37
Discharge: HOME | End: 2022-08-25
Attending: EMERGENCY MEDICINE
Payer: COMMERCIAL

## 2022-08-25 ENCOUNTER — APPOINTMENT (OUTPATIENT)
Dept: GENERAL RADIOLOGY | Age: 37
End: 2022-08-25
Attending: EMERGENCY MEDICINE
Payer: COMMERCIAL

## 2022-08-25 VITALS
WEIGHT: 232 LBS | RESPIRATION RATE: 18 BRPM | HEART RATE: 80 BPM | OXYGEN SATURATION: 100 % | DIASTOLIC BLOOD PRESSURE: 86 MMHG | SYSTOLIC BLOOD PRESSURE: 130 MMHG | TEMPERATURE: 98.2 F | HEIGHT: 70 IN | BODY MASS INDEX: 33.21 KG/M2

## 2022-08-25 DIAGNOSIS — Z76.89 ENCOUNTER FOR ASSESSMENT OF STD EXPOSURE: Primary | ICD-10-CM

## 2022-08-25 LAB — HCG UR QL: NEGATIVE

## 2022-08-25 PROCEDURE — 74011250637 HC RX REV CODE- 250/637: Performed by: EMERGENCY MEDICINE

## 2022-08-25 PROCEDURE — 96372 THER/PROPH/DIAG INJ SC/IM: CPT

## 2022-08-25 PROCEDURE — 99284 EMERGENCY DEPT VISIT MOD MDM: CPT

## 2022-08-25 PROCEDURE — 74011000250 HC RX REV CODE- 250: Performed by: EMERGENCY MEDICINE

## 2022-08-25 PROCEDURE — 74018 RADEX ABDOMEN 1 VIEW: CPT

## 2022-08-25 PROCEDURE — 74011250636 HC RX REV CODE- 250/636: Performed by: EMERGENCY MEDICINE

## 2022-08-25 PROCEDURE — 81025 URINE PREGNANCY TEST: CPT

## 2022-08-25 RX ORDER — DOXYCYCLINE 100 MG/1
100 CAPSULE ORAL
Status: COMPLETED | OUTPATIENT
Start: 2022-08-25 | End: 2022-08-25

## 2022-08-25 RX ORDER — DOXYCYCLINE HYCLATE 100 MG
100 TABLET ORAL 2 TIMES DAILY
Qty: 14 TABLET | Refills: 0 | Status: SHIPPED | OUTPATIENT
Start: 2022-08-25 | End: 2022-09-01

## 2022-08-25 RX ADMIN — DOXYCYCLINE HYCLATE 100 MG: 100 CAPSULE ORAL at 22:49

## 2022-08-25 RX ADMIN — LIDOCAINE HYDROCHLORIDE 500 MG: 10 INJECTION, SOLUTION EPIDURAL; INFILTRATION; INTRACAUDAL; PERINEURAL at 23:20

## 2022-08-26 NOTE — DISCHARGE INSTRUCTIONS
Exposure to Sexually Transmitted Infections: Care Instructions  Overview  Sexually transmitted infections (STIs) are diseases spread by sexual contact. This includes vaginal, oral, and anal sex. If you're pregnant, you can also spread them to your baby before or during the birth. There are at least 20 different STIs. They include chlamydia, gonorrhea, syphilis, and human immunodeficiency virus (HIV). (This is the virus that causes AIDS.) Some STIs can reduce the chance of getting pregnant in the future. Treatment can cure STIs caused by bacteria. STIs caused by viruses, such as HIV, can be treated, but they can't be cured. Follow-up care is a key part of your treatment and safety. Be sure to make and go to all appointments, and call your doctor if you are having problems. It's also a good idea to know your test results and keep a list of the medicines you take. How can you care for yourself at home? Take medicines exactly as prescribed. If your doctor prescribed antibiotics, take them as directed. Don't stop taking them just because you feel better. You need to take the full course of antibiotics. Tell your sex partner(s) that they will need treatment. Don't douche. Douching changes the normal balance of bacteria in your vagina. It may increase the risk of spreading the infection to your uterus or other reproductive organs. How can you prevent sexually transmitted infections (STIs)? You can help prevent STIs if you wait to have sex with a new partner (or partners) until you've each been tested for STIs. It also helps if you use condoms (male or female condoms) and if you limit your sex partners to one person who only has sex with you. When should you call for help? Call your doctor now or seek immediate medical care if:    You have new pain in your belly or pelvis. You have symptoms of a urinary tract infection. These may include:  Pain or burning when you urinate.   A frequent need to urinate without being able to pass much urine. Pain in the flank, which is just below the rib cage and above the waist on either side of the back. Blood in your urine. A fever. You have new or worsening pain or swelling in the scrotum. Watch closely for changes in your health, and be sure to contact your doctor if:    You have unusual vaginal bleeding. You have a discharge from the vagina or penis. You have any new symptoms, such as sores, bumps, rashes, blisters, or warts. You have itching, tingling, pain, or burning in the genital or anal area. You think you may have an STI. Where can you learn more? Go to http://www.gray.com/  Enter M049 in the search box to learn more about \"Exposure to Sexually Transmitted Infections: Care Instructions. \"  Current as of: November 17, 2021               Content Version: 13.2  © 2006-2022 [employer] Care instructions adapted under license by [employer] (which disclaims liability or warranty for this information). If you have questions about a medical condition or this instruction, always ask your healthcare professional. [place] [address] any warranty or liability for your use of this information.

## 2022-08-26 NOTE — ED TRIAGE NOTES
Pt c/o of generalized lower abdominal pain that started last night. Pt denies n/v/d. Pt states she had to strain to have a bowel movement this morning.

## 2022-09-02 NOTE — ED PROVIDER NOTES
HPI Pt is a 43 yo female who c/o of having a generalized lower abdominal pain that started last night. Pt denies n/v/d. Pt states she was exposed to an STD and is requesting treatment.     Past Medical History:   Diagnosis Date    Arthritic-like pain     knees and elbows    Chronic back pain     has required steroid injections    Diabetes ([address]     resolved type 2     Endometrial polyp     Endometriosis     Essential hypertension     resolved prior to pregnancy no longer taking linsinpril    H/O fetal loss     Irregular menses     Morbid obesity (HCC)     Morbid obesity with body mass index of 40.0-49.9 (HCC)     Polycystic disease, ovaries     Psychiatric disorder     depression, PTSD, Anxiety    Ruptured cyst of ovary        Past Surgical History:   Procedure Laterality Date    HX GASTRIC BYPASS      sleeve    HX GI      HX GYN  03/31/2017    D&C         Family History:   Problem Relation Age of Onset    Diabetes Maternal Aunt     Diabetes Paternal Grandmother     Diabetes Paternal Grandfather     Diabetes Mother        Social History     Socioeconomic History    Marital status: LEGALLY      Spouse name: Not on file    Number of children: Not on file    Years of education: Not on file    Highest education level: Not on file   Occupational History    Not on file   Tobacco Use    Smoking status: Never    Smokeless tobacco: Never   Vaping Use    Vaping Use: Never used   Substance and Sexual Activity    Alcohol use: Not Currently     Alcohol/week: 1.0 standard drink     Types: 1 Shots of liquor per week    Drug use: No    Sexual activity: Yes     Partners: Male     Birth control/protection: None   Other Topics Concern     Service Not Asked    Blood Transfusions Not Asked    Caffeine Concern Not Asked    Occupational Exposure Not Asked    Hobby Hazards Not Asked    Sleep Concern Not Asked    Stress Concern Not Asked    Weight Concern Not Asked    Special Diet Not Asked    Back Care Not Asked    Exercise Not Asked    Bike Helmet Not Asked    Seat Belt Not Asked    Self-Exams Not Asked   Social History Narrative    Not on file     Social Determinants of Health     Financial Resource Strain: Not on file   Food Insecurity: Not on file   Transportation Needs: Not on file   Physical Activity: Not on file   Stress: Not on file   Social Connections: Not on file   Intimate Partner Violence: Not on file   Housing Stability: Not on file         ALLERGIES: Percocet [oxycodone-acetaminophen] and Tramadol    Review of Systems   Constitutional: Negative. HENT: Negative. Eyes: Negative. Respiratory: Negative. Cardiovascular: Negative. Gastrointestinal: Negative. Endocrine: Negative. Genitourinary:  Positive for dysuria. Musculoskeletal: Negative. Skin: Negative. Allergic/Immunologic: Negative. Neurological: Negative. Hematological: Negative. Psychiatric/Behavioral: Negative. All other systems reviewed and are negative. Vitals:    08/25/22 2005   BP: 130/86   Pulse: 80   Resp: 18   Temp: 98.2 °F (36.8 °C)   SpO2: 100%   Weight: 105.2 kg (232 lb)   Height: 5' 10\" (1.778 m)            Physical Exam  Vitals and nursing note reviewed. Constitutional:       General: She is not in acute distress. Appearance: She is well-developed. She is not diaphoretic. HENT:      Head: Normocephalic. Right Ear: External ear normal.      Left Ear: External ear normal.      Mouth/Throat:      Pharynx: No oropharyngeal exudate. Eyes:      General: No scleral icterus. Right eye: No discharge. Left eye: No discharge. Conjunctiva/sclera: Conjunctivae normal.      Pupils: Pupils are equal, round, and reactive to light. Neck:      Thyroid: No thyromegaly. Vascular: No JVD. Trachea: No tracheal deviation. Cardiovascular:      Rate and Rhythm: Normal rate and regular rhythm. Heart sounds: Normal heart sounds. No murmur heard. No friction rub. No gallop. Pulmonary:      Effort: Pulmonary effort is normal. No respiratory distress. Breath sounds: Normal breath sounds. No stridor. No wheezing or rales. Chest:      Chest wall: No tenderness. Abdominal:      General: Bowel sounds are normal. There is no distension. Palpations: Abdomen is soft. There is no mass. Tenderness: There is no abdominal tenderness. There is no guarding or rebound. Musculoskeletal:         General: No tenderness. Normal range of motion. Cervical back: Normal range of motion and neck supple. Lymphadenopathy:      Cervical: No cervical adenopathy. Skin:     General: Skin is warm and dry. Coloration: Skin is not pale. Findings: No erythema or rash. Neurological:      Mental Status: She is alert and oriented to person, place, and time. Cranial Nerves: No cranial nerve deficit. Motor: No abnormal muscle tone. Coordination: Coordination normal.      Deep Tendon Reflexes: Reflexes normal.        MDM     Amount and/or Complexity of Data Reviewed  Clinical lab tests: ordered and reviewed        Procedures    Dx:  STD exposure    Disp: D/C home. Rx: doxycycline. F/U PCP in 1 week. Return to ER prn. Dictation disclaimer:  Please note that this dictation was completed with [employer], the [employer] voice recognition software. Quite often unanticipated grammatical, syntax, homophones, and other interpretive errors are inadvertently transcribed by the computer software. Please disregard these errors. Please excuse any errors that have escaped final proofreading.

## 2023-08-08 ENCOUNTER — HOSPITAL ENCOUNTER (EMERGENCY)
Facility: HOSPITAL | Age: 38
Discharge: HOME | End: 2023-08-08
Attending: EMERGENCY MEDICINE
Payer: COMMERCIAL

## 2023-08-08 ENCOUNTER — APPOINTMENT (OUTPATIENT)
Facility: HOSPITAL | Age: 38
End: 2023-08-08
Payer: COMMERCIAL

## 2023-08-08 VITALS
WEIGHT: 230 LBS | BODY MASS INDEX: 32.93 KG/M2 | SYSTOLIC BLOOD PRESSURE: 115 MMHG | TEMPERATURE: 98.7 F | RESPIRATION RATE: 18 BRPM | OXYGEN SATURATION: 100 % | HEART RATE: 100 BPM | DIASTOLIC BLOOD PRESSURE: 81 MMHG | HEIGHT: 70 IN

## 2023-08-08 DIAGNOSIS — R10.84 GENERALIZED ABDOMINAL PAIN: Primary | ICD-10-CM

## 2023-08-08 DIAGNOSIS — O36.80X0 PREGNANCY OF UNKNOWN ANATOMIC LOCATION: ICD-10-CM

## 2023-08-08 DIAGNOSIS — N30.00 ACUTE CYSTITIS WITHOUT HEMATURIA: ICD-10-CM

## 2023-08-08 DIAGNOSIS — R11.0 NAUSEA: ICD-10-CM

## 2023-08-08 DIAGNOSIS — Z3A.01 LESS THAN 8 WEEKS GESTATION OF PREGNANCY: ICD-10-CM

## 2023-08-08 DIAGNOSIS — O23.41 URINARY TRACT INFECTION IN MOTHER DURING FIRST TRIMESTER OF PREGNANCY: ICD-10-CM

## 2023-08-08 LAB
ALBUMIN SERPL-MCNC: 3.4 G/DL (ref 3.4–5)
ALBUMIN/GLOB SERPL: 1 (ref 0.8–1.7)
ALP SERPL-CCNC: 80 U/L (ref 45–117)
ALT SERPL-CCNC: 23 U/L (ref 13–56)
AMPHET UR QL SCN: POSITIVE
ANION GAP SERPL CALC-SCNC: 3 MMOL/L (ref 3–18)
APPEARANCE UR: CLEAR
AST SERPL-CCNC: 10 U/L (ref 10–38)
BACTERIA URNS QL MICRO: ABNORMAL /HPF
BARBITURATES UR QL SCN: NEGATIVE
BASOPHILS # BLD: 0.1 K/UL (ref 0–0.1)
BASOPHILS NFR BLD: 1 % (ref 0–2)
BENZODIAZ UR QL: NEGATIVE
BILIRUB SERPL-MCNC: 0.2 MG/DL (ref 0.2–1)
BILIRUB UR QL: NEGATIVE
BUN SERPL-MCNC: 10 MG/DL (ref 7–18)
BUN/CREAT SERPL: 11 (ref 12–20)
CALCIUM SERPL-MCNC: 8.8 MG/DL (ref 8.5–10.1)
CANNABINOIDS UR QL SCN: NEGATIVE
CHLORIDE SERPL-SCNC: 108 MMOL/L (ref 100–111)
CO2 SERPL-SCNC: 24 MMOL/L (ref 21–32)
COCAINE UR QL SCN: NEGATIVE
COLOR UR: YELLOW
CREAT SERPL-MCNC: 0.88 MG/DL (ref 0.6–1.3)
DIFFERENTIAL METHOD BLD: ABNORMAL
EOSINOPHIL # BLD: 0.5 K/UL (ref 0–0.4)
EOSINOPHIL NFR BLD: 4 % (ref 0–5)
EPITH CASTS URNS QL MICRO: ABNORMAL /LPF (ref 0–5)
ERYTHROCYTE [DISTWIDTH] IN BLOOD BY AUTOMATED COUNT: 14.4 % (ref 11.6–14.5)
GLOBULIN SER CALC-MCNC: 3.5 G/DL (ref 2–4)
GLUCOSE SERPL-MCNC: 205 MG/DL (ref 74–99)
GLUCOSE UR STRIP.AUTO-MCNC: 100 MG/DL
HCG SERPL-ACNC: 149 MIU/ML (ref 0–10)
HCG UR QL: POSITIVE
HCT VFR BLD AUTO: 37.2 % (ref 35–45)
HGB BLD-MCNC: 12.3 G/DL (ref 12–16)
HGB UR QL STRIP: NEGATIVE
IMM GRANULOCYTES # BLD AUTO: 0.1 K/UL (ref 0–0.04)
IMM GRANULOCYTES NFR BLD AUTO: 1 % (ref 0–0.5)
KETONES UR QL STRIP.AUTO: NEGATIVE MG/DL
LEUKOCYTE ESTERASE UR QL STRIP.AUTO: ABNORMAL
LIPASE SERPL-CCNC: 121 U/L (ref 73–393)
LYMPHOCYTES # BLD: 3.5 K/UL (ref 0.9–3.6)
LYMPHOCYTES NFR BLD: 24 % (ref 21–52)
Lab: ABNORMAL
MCH RBC QN AUTO: 29.4 PG (ref 24–34)
MCHC RBC AUTO-ENTMCNC: 33.1 G/DL (ref 31–37)
MCV RBC AUTO: 88.8 FL (ref 78–100)
METHADONE UR QL: NEGATIVE
MONOCYTES # BLD: 0.8 K/UL (ref 0.05–1.2)
MONOCYTES NFR BLD: 5 % (ref 3–10)
NEUTS SEG # BLD: 9.9 K/UL (ref 1.8–8)
NEUTS SEG NFR BLD: 66 % (ref 40–73)
NITRITE UR QL STRIP.AUTO: NEGATIVE
NRBC # BLD: 0 K/UL (ref 0–0.01)
NRBC BLD-RTO: 0 PER 100 WBC
OPIATES UR QL: NEGATIVE
PCP UR QL: NEGATIVE
PH UR STRIP: 6 (ref 5–8)
PLATELET # BLD AUTO: 252 K/UL (ref 135–420)
PMV BLD AUTO: 9.2 FL (ref 9.2–11.8)
POTASSIUM SERPL-SCNC: 3.6 MMOL/L (ref 3.5–5.5)
PROT SERPL-MCNC: 6.9 G/DL (ref 6.4–8.2)
PROT UR STRIP-MCNC: NEGATIVE MG/DL
RBC # BLD AUTO: 4.19 M/UL (ref 4.2–5.3)
RBC #/AREA URNS HPF: ABNORMAL /HPF (ref 0–5)
SODIUM SERPL-SCNC: 135 MMOL/L (ref 136–145)
SP GR UR REFRACTOMETRY: 1.01 (ref 1–1.03)
UROBILINOGEN UR QL STRIP.AUTO: 1 EU/DL (ref 0.2–1)
WBC # BLD AUTO: 15 K/UL (ref 4.6–13.2)
WBC URNS QL MICRO: ABNORMAL /HPF (ref 0–4)

## 2023-08-08 PROCEDURE — 83690 ASSAY OF LIPASE: CPT

## 2023-08-08 PROCEDURE — 81001 URINALYSIS AUTO W/SCOPE: CPT

## 2023-08-08 PROCEDURE — 76830 TRANSVAGINAL US NON-OB: CPT

## 2023-08-08 PROCEDURE — 80307 DRUG TEST PRSMV CHEM ANLYZR: CPT

## 2023-08-08 PROCEDURE — 87086 URINE CULTURE/COLONY COUNT: CPT

## 2023-08-08 PROCEDURE — 84702 CHORIONIC GONADOTROPIN TEST: CPT

## 2023-08-08 PROCEDURE — 85025 COMPLETE CBC W/AUTO DIFF WBC: CPT

## 2023-08-08 PROCEDURE — 80053 COMPREHEN METABOLIC PANEL: CPT

## 2023-08-08 PROCEDURE — 81025 URINE PREGNANCY TEST: CPT

## 2023-08-08 PROCEDURE — 99284 EMERGENCY DEPT VISIT MOD MDM: CPT

## 2023-08-08 RX ORDER — LANOLIN ALCOHOL/MO/W.PET/CERES
50 CREAM (GRAM) TOPICAL DAILY
Qty: 30 TABLET | Refills: 0 | Status: SHIPPED | OUTPATIENT
Start: 2023-08-08 | End: 2023-09-07

## 2023-08-08 RX ORDER — CEPHALEXIN 250 MG/1
250 CAPSULE ORAL 4 TIMES DAILY
Qty: 28 CAPSULE | Refills: 0 | Status: SHIPPED | OUTPATIENT
Start: 2023-08-08 | End: 2023-08-15

## 2023-08-08 ASSESSMENT — ENCOUNTER SYMPTOMS
ABDOMINAL PAIN: 1
NAUSEA: 1
DIARRHEA: 0
ABDOMINAL DISTENTION: 0
VOMITING: 1
RHINORRHEA: 0

## 2023-08-08 NOTE — ED TRIAGE NOTES
Patient presents to the ED for abd pain and nausea. Pt states that she has been having the symptoms off and on for a few weeks, however, the vomiting has gotten worse. Pt states that she took several pregnancy test that red positive. NAD noted.

## 2023-08-08 NOTE — ED PROVIDER NOTES
unanticipated grammatical, syntax, homophones, and other interpretive errors are inadvertently transcribed by the computer software. Please disregard these errors. Please excuse any errors that have escaped final proofreading.       Phoenix Reza PA-C  08/08/23 0208

## 2023-08-09 NOTE — ED NOTES
9:00 PM Assumed care of the patient at this time. Discussed with ABRAHAM Holden concerning patient [facility], standard discussion of reason for visit, HPI, ROS, PE, and current results available. Recommendation for  obtaining pending US followed by bedside re-evaluation to dispo the pt. Paulette Zaragoza PA-C      11:00 PM  US shows no definitive IUP, likely early gestation given the pt's beta. Pt made aware, will follow-up with OBGYN closely for trended US and beta. Return precautions advised. Pt agrees. Paulette Zaragoza PA-C     Disposition: d/c    Dictation disclaimer:  Please note that this dictation was completed with [employer], the computer voice recognition software. Quite often unanticipated grammatical, syntax, homophones, and other interpretive errors are inadvertently transcribed by the computer software. Please disregard these errors. Please excuse any errors that have escaped final proofreading.        Paulette Bella [place], [place]  08/08/23 0868

## 2023-08-10 LAB
BACTERIA SPEC CULT: NORMAL
SERVICE CMNT-IMP: NORMAL

## 2023-12-06 ENCOUNTER — OFFICE VISIT (OUTPATIENT)
Age: 38
End: 2023-12-06
Payer: COMMERCIAL

## 2023-12-06 VITALS
SYSTOLIC BLOOD PRESSURE: 107 MMHG | BODY MASS INDEX: 35.22 KG/M2 | OXYGEN SATURATION: 100 % | HEIGHT: 70 IN | TEMPERATURE: 97 F | DIASTOLIC BLOOD PRESSURE: 71 MMHG | HEART RATE: 85 BPM | WEIGHT: 246 LBS

## 2023-12-06 DIAGNOSIS — E66.01 SEVERE OBESITY (BMI 35.0-39.9) WITH COMORBIDITY (HCC): ICD-10-CM

## 2023-12-06 DIAGNOSIS — F41.9 ANXIETY: ICD-10-CM

## 2023-12-06 DIAGNOSIS — F17.290 CIGAR SMOKER: ICD-10-CM

## 2023-12-06 DIAGNOSIS — Z98.84 S/P LAPAROSCOPIC SLEEVE GASTRECTOMY: ICD-10-CM

## 2023-12-06 DIAGNOSIS — F90.9 ATTENTION DEFICIT HYPERACTIVITY DISORDER (ADHD), UNSPECIFIED ADHD TYPE: ICD-10-CM

## 2023-12-06 DIAGNOSIS — R73.09 ELEVATED HEMOGLOBIN A1C: ICD-10-CM

## 2023-12-06 DIAGNOSIS — I10 ESSENTIAL HYPERTENSION: ICD-10-CM

## 2023-12-06 DIAGNOSIS — K90.49 MALABSORPTION DUE TO INTOLERANCE, NOT ELSEWHERE CLASSIFIED: ICD-10-CM

## 2023-12-06 DIAGNOSIS — Z90.3 HISTORY OF SLEEVE GASTRECTOMY: Primary | ICD-10-CM

## 2023-12-06 DIAGNOSIS — K21.00 GASTROESOPHAGEAL REFLUX DISEASE WITH ESOPHAGITIS WITHOUT HEMORRHAGE: ICD-10-CM

## 2023-12-06 PROBLEM — K21.9 GERD (GASTROESOPHAGEAL REFLUX DISEASE): Status: ACTIVE | Noted: 2023-12-06

## 2023-12-06 PROCEDURE — 3078F DIAST BP <80 MM HG: CPT | Performed by: SPECIALIST

## 2023-12-06 PROCEDURE — 99204 OFFICE O/P NEW MOD 45 MIN: CPT | Performed by: SPECIALIST

## 2023-12-06 PROCEDURE — 3074F SYST BP LT 130 MM HG: CPT | Performed by: SPECIALIST

## 2023-12-06 RX ORDER — CITALOPRAM 20 MG/1
20 TABLET ORAL DAILY
COMMUNITY

## 2023-12-06 RX ORDER — OMEPRAZOLE 20 MG/1
20 CAPSULE, DELAYED RELEASE ORAL DAILY
COMMUNITY

## 2023-12-06 RX ORDER — AMLODIPINE BESYLATE 5 MG/1
5 TABLET ORAL DAILY
COMMUNITY
Start: 2023-11-28

## 2023-12-06 RX ORDER — CARIPRAZINE 3 MG/1
3 CAPSULE, GELATIN COATED ORAL DAILY
COMMUNITY
Start: 2023-11-15

## 2023-12-06 ASSESSMENT — PATIENT HEALTH QUESTIONNAIRE - PHQ9
2. FEELING DOWN, DEPRESSED OR HOPELESS: 0
SUM OF ALL RESPONSES TO PHQ QUESTIONS 1-9: 0
1. LITTLE INTEREST OR PLEASURE IN DOING THINGS: 0
SUM OF ALL RESPONSES TO PHQ QUESTIONS 1-9: 0
SUM OF ALL RESPONSES TO PHQ9 QUESTIONS 1 & 2: 0
SUM OF ALL RESPONSES TO PHQ QUESTIONS 1-9: 0
SUM OF ALL RESPONSES TO PHQ QUESTIONS 1-9: 0

## 2023-12-11 ENCOUNTER — HOSPITAL ENCOUNTER (EMERGENCY)
Facility: HOSPITAL | Age: 38
Discharge: HOME | End: 2023-12-11
Attending: EMERGENCY MEDICINE
Payer: COMMERCIAL

## 2023-12-11 ENCOUNTER — APPOINTMENT (OUTPATIENT)
Facility: HOSPITAL | Age: 38
End: 2023-12-11
Payer: COMMERCIAL

## 2023-12-11 VITALS
OXYGEN SATURATION: 100 % | DIASTOLIC BLOOD PRESSURE: 88 MMHG | BODY MASS INDEX: 35.22 KG/M2 | RESPIRATION RATE: 18 BRPM | HEART RATE: 72 BPM | WEIGHT: 246 LBS | HEIGHT: 70 IN | SYSTOLIC BLOOD PRESSURE: 136 MMHG | TEMPERATURE: 97.6 F

## 2023-12-11 DIAGNOSIS — J06.9 UPPER RESPIRATORY TRACT INFECTION, UNSPECIFIED TYPE: Primary | ICD-10-CM

## 2023-12-11 LAB
FLUAV AG NPH QL IA: NEGATIVE
FLUBV AG NOSE QL IA: NEGATIVE
SARS-COV-2 RDRP RESP QL NAA+PROBE: NOT DETECTED
SOURCE: NORMAL

## 2023-12-11 PROCEDURE — 87635 SARS-COV-2 COVID-19 AMP PRB: CPT

## 2023-12-11 PROCEDURE — 99285 EMERGENCY DEPT VISIT HI MDM: CPT

## 2023-12-11 PROCEDURE — 71045 X-RAY EXAM CHEST 1 VIEW: CPT

## 2023-12-11 PROCEDURE — 93005 ELECTROCARDIOGRAM TRACING: CPT | Performed by: EMERGENCY MEDICINE

## 2023-12-11 PROCEDURE — 87804 INFLUENZA ASSAY W/OPTIC: CPT

## 2023-12-11 ASSESSMENT — PAIN DESCRIPTION - PAIN TYPE: TYPE: ACUTE PAIN

## 2023-12-11 ASSESSMENT — PAIN - FUNCTIONAL ASSESSMENT: PAIN_FUNCTIONAL_ASSESSMENT: 0-10

## 2023-12-11 NOTE — ED TRIAGE NOTES
Alert and oriented female ambulatory to triage with c/o N/V, CP, cough and dizziness that started Friday, but pain became worse. Denies any headache or SOB at this time. Pt denies taking any OTC medications. Past Medical History:   Diagnosis Date    ADHD     Anxiety     Cigar smoker     occasional black and mild use    Diabetes ([address])     Elevated hemoglobin A1c     elevated per midwife fall 2023 - considering Metformin    Essential hypertension     GERD (gastroesophageal reflux disease)     History of sleeve gastrectomy 12/2017    [place]    Intestinal malabsorption     Irregular menses     Severe obesity (BMI 35.0-39. 9) with comorbidity ([address])     Uses birth control     OCP use (does not know name as of Dec 2023)

## 2023-12-11 NOTE — ED NOTES
Bedside and Verbal shift change report given to KAMRAN Brady (oncoming nurse) by Katina Lackey RN (offgoing nurse). Report included the following information SBAR, Kardex, ED Summary, Procedure Summary, Intake/Output and MAR.      Nancy Nieto RN  12/11/23 2675

## 2023-12-11 NOTE — ED PROVIDER NOTES
[facility] EMERGENCY DEPT  EMERGENCY DEPARTMENT ENCOUNTER    Patient Name: Yoana Rizvi  MRN: [id number]  YOB: 1985  Provider: Espinoza Arenas MD  PCP: Rupa Cedillo MD   Time/Date of evaluation: 7:24 AM EST on 12/11/23    History of Presenting Illness     History Provided by: Patient  History is limited by: Nothing     HISTORY:   Yoana Rizvi is a 34 y.o. female presenting with multiple symptoms since Friday. She describes fatigue, body aches. Triage report says chest pain but this is really diffuse body aches. She denies any shortness of breath but has had a cough with greenish sputum production. 2 of her children have been sick at home. She has had chills but no measured fevers. Nursing Notes were all reviewed and agreed with or any disagreements were addressed in the HPI. Past History     PAST MEDICAL HISTORY:  Past Medical History:   Diagnosis Date    ADHD     Anxiety     Cigar smoker     occasional black and mild use    Diabetes ([address])     Elevated hemoglobin A1c     elevated per midwife fall 2023 - considering Metformin    Essential hypertension     GERD (gastroesophageal reflux disease)     History of sleeve gastrectomy 12/2017    [place]    Intestinal malabsorption     Irregular menses     Severe obesity (BMI 35.0-39. 9) with comorbidity ([address])     Uses birth control     OCP use (does not know name as of Dec 2023)       PAST SURGICAL HISTORY:  Past Surgical History:   Procedure Laterality Date    DILATION AND CURETTAGE OF UTERUS N/A 09/27/2023    DILATATION AND CURETTAGE SUCTION performed by Clarissa Elise MD at [facility] OR    GI  12/2017    Sleeve resection - [place]       FAMILY HISTORY:  Family History   Problem Relation Age of Onset    Unknown Mother         unknown cause of death    Diabetes Paternal Grandmother     Diabetes Paternal Grandfather     Diabetes Maternal Aunt        SOCIAL HISTORY:  Social History     Tobacco Use    Smoking status:

## 2023-12-11 NOTE — ED NOTES
Condition stable. Patient education was completed. Patient education was taught to patient using discussion and handout. Patient verbalized understanding. Patient was discharged to home by self. Patient was discharged ambulatory.         Willie Pahceco RN  12/11/23 [id number]

## 2023-12-12 LAB
EKG ATRIAL RATE: 77 BPM
EKG DIAGNOSIS: NORMAL
EKG P AXIS: 33 DEGREES
EKG P-R INTERVAL: 168 MS
EKG Q-T INTERVAL: 392 MS
EKG QRS DURATION: 72 MS
EKG QTC CALCULATION (BAZETT): 443 MS
EKG R AXIS: 35 DEGREES
EKG T AXIS: 35 DEGREES
EKG VENTRICULAR RATE: 77 BPM

## 2023-12-12 PROCEDURE — 93010 ELECTROCARDIOGRAM REPORT: CPT | Performed by: INTERNAL MEDICINE

## 2024-02-28 ENCOUNTER — HOSPITAL ENCOUNTER (OUTPATIENT)
Facility: HOSPITAL | Age: 39
Discharge: HOME | End: 2024-03-02
Payer: COMMERCIAL

## 2024-02-28 ENCOUNTER — HOSPITAL ENCOUNTER (OUTPATIENT)
Facility: HOSPITAL | Age: 39
Setting detail: OUTPATIENT SURGERY
Discharge: HOME | End: 2024-03-02
Payer: COMMERCIAL

## 2024-02-28 VITALS
HEART RATE: 84 BPM | TEMPERATURE: 98.1 F | OXYGEN SATURATION: 100 % | DIASTOLIC BLOOD PRESSURE: 75 MMHG | BODY MASS INDEX: 34.49 KG/M2 | HEIGHT: 70 IN | RESPIRATION RATE: 18 BRPM | SYSTOLIC BLOOD PRESSURE: 141 MMHG | WEIGHT: 240.9 LBS

## 2024-02-28 DIAGNOSIS — E66.01 MORBID OBESITY (HCC): ICD-10-CM

## 2024-02-28 PROCEDURE — 74240 X-RAY XM UPR GI TRC 1CNTRST: CPT | Performed by: SPECIALIST

## 2024-02-28 PROCEDURE — 74240 X-RAY XM UPR GI TRC 1CNTRST: CPT

## 2024-02-28 PROCEDURE — 2500000003 HC RX 250 WO HCPCS: Performed by: SPECIALIST

## 2024-02-28 PROCEDURE — 74220 X-RAY XM ESOPHAGUS 1CNTRST: CPT

## 2024-02-28 RX ADMIN — BARIUM SULFATE 100 ML: 960 POWDER, FOR SUSPENSION ORAL at 14:17

## 2024-02-28 NOTE — PROCEDURES
[facility]    Upper GI Procedure Report      Matilde Ang    Medical Record Number:[id number]    1985    Date of Service - February 28, 2024    Pre-Op Diagnosis - patient is status post sleeve resection performed by this office 6 years ago with complaint of GERD and weight regain. They now present for UGI to assess their post surgical anatomy.    Post-Op Diagnosis -same    Procedure - UGI study with barium    Surgeon - Rick Day MD    Assistant - None    Complications - None    Specimens - None    Implants - None    Estimate Blood Loss - None    Statement of Medical Necessity - Need for radiologic evaluation prior to further management of their care..    Procedure -the patient was brought to the fluoroscopy suite where they were given thin barium.  On swallowing the barium the patient was noted to have normal peristalsis of their esophagus with progressive flow into the distal esophagus.  Specific findings of the distal esophagus revealed that they did note have a hiatal hernia. Contrast flowed normally through the esophagus and into a properly sized sleeve stomach without reflux or obstruction or signs of stricture. The stomach filled in a timely manner and emptied into the duodenum without issue or hesitation. The anatomy was normal for the timeframe with no stricture or obstruction or any other abnormality.  Given the benign findings of today's exam we will plan for sleeve to bypass conversion.    Rick Day MD

## 2024-02-28 NOTE — PROGRESS NOTES
[facility] UGI FOCUS NOTE      Date:  2/28/2024  Time:  2:02 PM    Patient:  Matilde Ang  Procedure:  UGI      Patient Questions  Lap Band Adjustment Patient Questionnaire:  If female, are you pregnant? []Yes     [x]No  Tolerates thick liquids:  [x]Well   []1     []2     []3     []4     []5     []Poorly  Tolerates red meat:  [x]Well   []1     []2     []3     []4     []5     [] Poorly  Tolerates chicken:  [x]Well   []1     []2     []3     []4     []5     []Poorly  Tolerates fish:   [x]Well   []1     []2     []3     []4     []5     []Poorly  Hunger is:   []Well Controlled     []1     [x]2     []3     []4     []5      [] Poorly Controlled  Nightime regurgitation:  []Never     [x]1     []2     []3     []4     []5     []Frequently    Lap Band Info:  Band Type  []Realize     []Realize-C     []AP     []VG     []10cm     []Unknown  []Other      Comments:        Iwona Arauz RN

## 2024-03-13 ENCOUNTER — HOSPITAL ENCOUNTER (OUTPATIENT)
Facility: HOSPITAL | Age: 39
Discharge: HOME | End: 2024-03-16

## 2024-03-13 VITALS — HEIGHT: 70 IN | BODY MASS INDEX: 35.07 KG/M2 | WEIGHT: 245 LBS

## 2024-03-13 NOTE — PROGRESS NOTES
Medical Weight Loss Multi-Disciplinary Program    Patient's Name: Matilde Ang      Age: 38 y.o.  YOB: 1985      Sex: female    Session #1. Pt attended in-person class.  Weight obtained in office.    Date: 3/13/2024    Vitals:    03/13/24 1610   Weight: 111.1 kg (245 lb)   Height: 1.778 m (5' 10\")      Body mass index is 35.15 kg/m².     Pounds Lost since last month: N/A   Pounds Gained since last month: N/A    Starting Weight: 246 lbs  Previous Month’s Weight: N/A  Overall Pounds Lost: 1 lbs  Overall Pounds Gained: 0.0 lbs      Class Guidelines    Guidelines are reviewed with patient at the start of every class.    1. Patient understands that weight loss trial classes must be consecutive.  Patient understands if they miss a class, it is their responsibility to contact me to reschedule class.  I will reach out to patient after their first no show.  2.  Patient understands the expectations that weight maintenance/weight loss is expected during the classes.  Failure to demonstrate changes may result in extension of weight loss trial, followed by returning to see the surgeon.  Patient understands that they CANNOT gain any weight during the weight loss trial.  Gaining weight will result in extension of weight loss trial.  3. Patient is also instructed to complete their labwork, psychological evaluation visit, and any other tests that the surgeon has used while they are working on their weight loss trial.  4.  Patient was instructed to bring their packet of nutrition education materials to every class and appointment.        Eating Habits and Behaviors    Today in class we reviewed the Key Diet Principles.  Patient was encouraged to consume 3 meals each day, and the timing the meals was reviewed.  Meal time behaviors that will help pt to be successful with their weight loss efforts were additionally reviewed.      We discussed the importance of drinking adequate amounts of fluids, recommending

## 2024-04-11 ENCOUNTER — HOSPITAL ENCOUNTER (OUTPATIENT)
Facility: HOSPITAL | Age: 39
Discharge: HOME | End: 2024-04-14

## 2024-04-11 VITALS — HEIGHT: 70 IN | WEIGHT: 235.5 LBS | BODY MASS INDEX: 33.71 KG/M2

## 2024-04-11 NOTE — PROGRESS NOTES
amounts of fluids, recommending that patient consume a minimum of 64 oz of sugar-free, caffeine-free and carbonation-free fluids each day.  Patient was encouraged to eliminate sugar-sweetened beverages such as sweet tea, fruit juice, fruit smoothies, flavored coffee drinks and regular sodas.      During the weight loss trial, patient was encouraged to focus on eating protein-forward meals, with a daily goal of  grams protein.  Patient was also advised to decrease carbohydrate intake to <100 g/d, choosing complex vs. simple carbohydrates in limited amounts.  We also discussed limiting fat intake.  Encouraged patient to follow the \"3-gram rule\" when choosing foods by looking for items containing <3 g of fat and sugar per serving.  We reviewed meal planning guidelines and discussed appropriate meal and snack options.  We also talked about protein drinks and patient was encouraged to start trying these, using them either for a meal replacement or a protein-rich snack.  We reviewed the nutritional guidelines for selecting protein shakes.      Pre-operative vitamin and mineral supplementation were reviewed.  Patient was instructed to choose chewable complete multivitamin with iron in NON-gummy form. Selection of probiotic was reviewed.    Patient's current diet habits include:  Patient is eating 3 meals and 2 snacks per day.  Pt has found 1 protein supplements/shakes for use post-op in meeting their protein needs.    Patient's plan for dietary and/or behavior changes in the upcoming month: \"add more protein in meals\".      Physical Activity/Exercise    Comments:    We talked about exercise.  Patient was given reasons of why exercise is so important and how that can help with their long-term success.  I have encouraged patient to get a support system to help with the activity.  Recommended that pt aim for 150 min/wk moderate to vigorous physical activity.    We talked about recommended types of physical activity,

## 2024-05-02 ENCOUNTER — HOSPITAL ENCOUNTER (OUTPATIENT)
Facility: HOSPITAL | Age: 39
Discharge: HOME | End: 2024-05-05

## 2024-05-02 VITALS — BODY MASS INDEX: 32.9 KG/M2 | HEIGHT: 70 IN | WEIGHT: 229.8 LBS

## 2024-05-02 NOTE — PROGRESS NOTES
sweetened beverages - goal of calorie-free beverages only.              - Must eliminate caffeine prior to surgery and avoid for ~6-8 weeks.   - Practice 30:30 rule,  food and fluid intake.  4. Start activity regimen, work to increase ADLs.  5. Start Complete MVI and probiotic at least 30 days pre-op.    .

## 2024-06-06 ENCOUNTER — HOSPITAL ENCOUNTER (OUTPATIENT)
Facility: HOSPITAL | Age: 39
Discharge: HOME | End: 2024-06-09

## 2024-06-06 ENCOUNTER — HOSPITAL ENCOUNTER (OUTPATIENT)
Facility: HOSPITAL | Age: 39
Discharge: HOME | End: 2024-06-06
Attending: FAMILY MEDICINE
Payer: COMMERCIAL

## 2024-06-06 VITALS — BODY MASS INDEX: 32.21 KG/M2 | WEIGHT: 225 LBS | HEIGHT: 70 IN

## 2024-06-06 VITALS — HEIGHT: 69 IN | BODY MASS INDEX: 32.81 KG/M2 | WEIGHT: 221.5 LBS

## 2024-06-06 DIAGNOSIS — Z12.31 VISIT FOR SCREENING MAMMOGRAM: ICD-10-CM

## 2024-06-06 PROCEDURE — 77063 BREAST TOMOSYNTHESIS BI: CPT

## 2024-06-06 NOTE — PROGRESS NOTES
Medical Weight Loss Multi-Disciplinary Program      Patient's Name: Matilde Ang Age: 39 y.o.   YOB: 1985 Sex: female     Session #4. Pt attended in-person class.  Weight obtained in office.    Date: 6/6/2024    Vitals:    06/06/24 1352   Weight: 100.5 kg (221 lb 8 oz)   Height: 1.74 m (5' 8.5\")      Body mass index is 33.19 kg/m².              Pounds Lost since last month: 8.3 lbs  Pounds Gained since last month: 0.0 lbs       Starting Weight: 246 lbs Previous Month’s Weight: 229.8 lbs   Overall Pounds Lost: 24.5 lbs Overall Pounds Gained: 0.0 lbs           Class Guidelines    Guidelines are reviewed with patient at the start of every class.    1. Patient understands that weight loss trial classes must be consecutive.  Patient understands if they miss a class, it is their responsibility to contact me to reschedule class.  I will reach out to patient after their first no show.  2.  Patient understands the expectations that weight maintenance/weight loss is expected during the classes.  Failure to demonstrate changes may result in extension of weight loss trial, followed by returning to see the surgeon.  Patient understands that they CANNOT gain any weight during the weight loss trial.  Gaining weight will result in extension of weight loss trial.  3. Patient is also instructed to complete their labwork, psychological evaluation visit, and any other tests that the surgeon has used while they are working on their weight loss trial.  4.  Patient was instructed to bring their packet of nutrition education materials to every class and appointment.      Eating Habits and Behaviors      Today in class we reviewed the Key Diet Principles.  Patient was encouraged to consume 3 meals each day, and meal timing was reviewed.  Meal time behaviors that will help pt to be successful with their weight loss efforts were also discussed.      We discussed the importance of drinking adequate amounts of fluids,

## 2024-06-25 ENCOUNTER — HOSPITAL ENCOUNTER (OUTPATIENT)
Facility: HOSPITAL | Age: 39
Discharge: HOME | End: 2024-06-28

## 2024-06-25 VITALS — HEIGHT: 69 IN | WEIGHT: 216 LBS | BODY MASS INDEX: 31.99 KG/M2

## 2024-06-25 NOTE — PROGRESS NOTES
Patient's Name: Matilde Ang   Age: 39 y.o.  YOB: 1985   Sex: female    Date:   6/25/2024      Session: 5 of  8    Pt participated in a pre-recorded educational session on the importance of starting to make healthy choices prior to weight loss surgery. General healthy foods were reviewed.     Vitals:    06/25/24 1330   Weight: 98 kg (216 lb)   Height: 1.74 m (5' 8.5\")      Body mass index is 32.36 kg/m².     Class Guidelines    Guidelines are reviewed with patient at the start of every class.    1. Patient understands that weight loss trial classes must be consecutive.  Patient understands if they miss a class, it is their responsibility to contact me to reschedule class.    2.  Patient understands the expectations that weight maintenance/weight loss is expected during the classes.  Failure to demonstrate changes may result in one extra month of weight loss trial, followed by going back to see the surgeon.  Patient understands that they CAN NOT gain any weight during the weight loss trial.  Gaining weight will result in extra classes.  3. Patient is also instructed to be doing their labs, blood work, psych visit, support group and any other test that the surgeon has used while they are working on their weight loss trial.  4.  Patient was instructed to bring their blue folder to every class and appointment.      Other Pertinent Information: Pt completed a nutrition questionnaire on their progress; comments below:    How many meals are you eating per day? 1-2  Are you measuring your portions? Scale  How often are you eating out (fast food, carry out, or sit down restaurants): Zero  How many carbohydrates (bread, rice, pasta, cereal, fruit and/or starchy vegetables) per day? Zero  What are you snacking on? Protein shakes   How often are you snacking per day? Zero?  How many “sweets” (cakes, candy, pastries, ice cream, cookies, etc.) are you eating per day? Zero  Fluid intake:  How many

## 2024-07-09 ENCOUNTER — HOSPITAL ENCOUNTER (OUTPATIENT)
Facility: HOSPITAL | Age: 39
Discharge: HOME | End: 2024-07-12

## 2024-07-09 VITALS — BODY MASS INDEX: 30.57 KG/M2 | HEIGHT: 69 IN | WEIGHT: 206.4 LBS

## 2024-07-09 NOTE — PROGRESS NOTES
Medical Weight Loss Multi-Disciplinary Program      Patient's Name: Matilde Ang Age: 39 y.o.   YOB: 1985 Sex: female     Session #6. Pt attended in-person class.  Weight obtained in office.    Date: 7/9/2024    Vitals:    07/09/24 1616   Weight: 93.6 kg (206 lb 6.4 oz)   Height: 1.74 m (5' 8.5\")      Body mass index is 30.93 kg/m².              Pounds Lost since last month: 15.1 lbs  Pounds Gained since last month: 0.0 lbs       Starting Weight: 246 lbs Previous Month’s Weight: 221.5 lbs   Overall Pounds Lost: 39.6 lbs Overall Pounds Gained: 0.0 lbs           Class Guidelines    Guidelines are reviewed with patient at the start of every class.    1. Patient understands that weight loss trial classes must be consecutive.  Patient understands if they miss a class, it is their responsibility to contact me to reschedule class.  I will reach out to patient after their first no show.  2.  Patient understands the expectations that weight maintenance/weight loss is expected during the classes.  Failure to demonstrate changes may result in extension of weight loss trial, followed by returning to see the surgeon.  Patient understands that they CANNOT gain any weight during the weight loss trial.  Gaining weight will result in extension of weight loss trial.  3. Patient is also instructed to complete their labwork, psychological evaluation visit, and any other tests that the surgeon has used while they are working on their weight loss trial.  4.  Patient was instructed to bring their packet of nutrition education materials to every class and appointment.        Eating Habits and Behaviors      Today in class we reviewed the Key Diet Principles.  Patient was encouraged to consume 3 meals each day, and meal timing was reviewed.  Meal time behaviors that will help pt to be successful with their weight loss efforts were also discussed.      We discussed the importance of drinking adequate amounts of

## 2024-07-23 ENCOUNTER — HOSPITAL ENCOUNTER (OUTPATIENT)
Facility: HOSPITAL | Age: 39
Discharge: HOME | End: 2024-07-26

## 2024-07-23 NOTE — PROGRESS NOTES
Patient's Name: Matilde Ang    Age: 39 y.o.  YOB: 1985    Sex: female    Date:  7/23/2024      Session: 7 of 8    Ht: 68.5 in.  Wt: 214.2 lbs.  BMI: 32 kg/m2    Pounds Lost since last month: 1.8   lbs              Pounds Gained since last month: 0.0 lbs    Starting Weight: 246 lbs  Previous Month Virtual Visit Weight: 216 lbs  Overall Pounds Lost: 31.8 lbs  Overall Pounds Gained: 0.0 lbs    Class Guidelines  Guidelines are reviewed with patient at the start of every class.    1. Patient understands that weight loss trial classes must be consecutive.  Patient understands if they miss a class, it is their responsibility to contact me to reschedule class.  I will reach out to patient after their first no show.  2.  Patient understands the expectations that weight maintenance/weight loss is expected during the classes.  Failure to demonstrate changes may result in one extra month of weight loss trial, followed by going back to see the surgeon.  Patient understands that they CAN NOT gain any weight during the weight loss trial.  Gaining weight will result in extra classes.  3. Patient is also instructed to be doing their labs, blood work, psych visit, support group and any other test that the surgeon has used while they are working on their weight loss trial.      Other Pertinent Information: Pt completed nutrition questionnaire on progress on past nutrition/exercise goals and planned goals to work on for next month.  Responses follow:    Rate your progress on the nutrition goal you set for yourself this month (you can grade yourself or give % progress, for example: Give self a B or 80% progress). 90%  What has or went well with achieving your goal? Controlling my food intake  What barriers or obstacles are you or did you encounter? Night cravings  What could you do differently next time to help overcome your barriers? Drink a shake       Eating Habits and Behaviors    This was a virtual

## 2024-08-14 ENCOUNTER — HOSPITAL ENCOUNTER (OUTPATIENT)
Facility: HOSPITAL | Age: 39
Discharge: HOME | End: 2024-08-17

## 2024-08-14 VITALS — BODY MASS INDEX: 30.57 KG/M2 | HEIGHT: 69 IN | WEIGHT: 206.4 LBS

## 2024-08-14 NOTE — PROGRESS NOTES
Patient's Name: Matilde Ang   Age: 39 y.o.  YOB: 1985   Sex: female    Date:   8/14/2024              Session: 8 of 8    Vitals:    08/14/24 1009   Weight: 93.6 kg (206 lb 6.4 oz)   Height: 1.74 m (5' 8.5\")      Body mass index is 30.93 kg/m².       Pounds Lost since last visit: 0.0                Pounds Gained since last visit: 0.0  Starting weight: 246              Last visit weight: 206.4    Overall Pounds Lost: 39.6   Overall Pounds Gained: 0.0    Alcohol intake:  Number of drinks per week: 0    Class Guidelines    Guidelines are reviewed with patient at the start of every class.    1. Patient understands that weight loss trial classes must be consecutive.  Patient understands if they miss a class, it is their responsibility to contact me to reschedule class.  I will reach out to patient after their first no show.  2.  Patient understands the expectations that weight maintenance/weight loss is expected during the classes.  Failure to demonstrate changes may result in one extra month of weight loss trial, followed by going back to see the surgeon.    3. Patient is also instructed to be doing their labs, blood work, psych visit, support group and any other test that the surgeon has used while they are working on their weight loss trial.   Patient understands that they CAN NOT gain any weight during the weight loss trial.  Gaining weight will result in extra classes    Comment: Pt completed nutrition questionnaire was reviewed and suggestions provided, as needed on diet, behavior, and exercise.     Eating Habits and Behaviors    This was a virtual nutrition education 2' COVID-19 restrictions and to ensure that the pt is able to complete their WLT in a timely manner.  Behavior modifications were reinforced.  This included not eating in front of the TV, which could lead to bigger portions and eating when one is not hungry.  As well as the importance of eating at a table and decreasing

## 2024-11-07 ENCOUNTER — CLINICAL DOCUMENTATION (OUTPATIENT)
Facility: HOSPITAL | Age: 39
End: 2024-11-07

## 2025-04-07 ENCOUNTER — TRANSCRIBE ORDERS (OUTPATIENT)
Facility: HOSPITAL | Age: 40
End: 2025-04-07

## 2025-04-07 DIAGNOSIS — R42 DIZZINESS: Primary | ICD-10-CM

## 2025-04-07 DIAGNOSIS — Z12.31 ENCOUNTER FOR SCREENING MAMMOGRAM FOR MALIGNANT NEOPLASM OF BREAST: Primary | ICD-10-CM

## 2025-06-06 ENCOUNTER — HOSPITAL ENCOUNTER (OUTPATIENT)
Age: 40
Discharge: HOME | End: 2025-06-09
Attending: FAMILY MEDICINE
Payer: COMMERCIAL

## 2025-06-06 ENCOUNTER — HOSPITAL ENCOUNTER (OUTPATIENT)
Facility: HOSPITAL | Age: 40
Discharge: HOME | End: 2025-06-09
Attending: FAMILY MEDICINE
Payer: COMMERCIAL

## 2025-06-06 VITALS — HEIGHT: 68 IN | WEIGHT: 206 LBS | BODY MASS INDEX: 31.22 KG/M2

## 2025-06-06 DIAGNOSIS — R42 DIZZINESS: ICD-10-CM

## 2025-06-06 DIAGNOSIS — Z12.31 ENCOUNTER FOR SCREENING MAMMOGRAM FOR MALIGNANT NEOPLASM OF BREAST: ICD-10-CM

## 2025-06-06 PROCEDURE — 70551 MRI BRAIN STEM W/O DYE: CPT

## 2025-06-06 PROCEDURE — 77063 BREAST TOMOSYNTHESIS BI: CPT

## (undated) DEVICE — PACK,LITHOTOMY: Brand: MEDLINE

## (undated) DEVICE — CATH URETH INTMIT ROB 14FR FUN -- USE ITEM 179521

## (undated) DEVICE — STERILE LATEX POWDER-FREE SURGICAL GLOVESWITH NITRILE COATING: Brand: PROTEXIS

## (undated) DEVICE — SHEAR HARMONIC 5MMX45CM -- ACE 7+

## (undated) DEVICE — STERILE POLYISOPRENE POWDER-FREE SURGICAL GLOVES: Brand: PROTEXIS

## (undated) DEVICE — SUT MONOCRYL PLUS UD 4-0 --

## (undated) DEVICE — Device

## (undated) DEVICE — TIP APPL L35CM RIG FOR SEAL EVICEL

## (undated) DEVICE — FORCEPS BX OVL CUP SERR DISP CAP L 240CM RAD JAW 4

## (undated) DEVICE — AMD ANTIMICROBIAL DRAIN SPONGES, 6 PLY, 0.2% POLYHEXAMETHYLENE BIGUANIDE HCI (PHMB): Brand: EXCILON

## (undated) DEVICE — ENDO CARRY-ON PROCEDURE KIT INCLUDES ENZYMATIC SPONGE, GAUZE, BIOHAZARD LABEL, TRAY, LUBRICANT, DIRTY SCOPE LABEL, WATER LABEL, TRAY, DRAWSTRING PAD, AND DEFENDO 4-PIECE KIT.: Brand: ENDO CARRY-ON PROCEDURE KIT

## (undated) DEVICE — SUTURE PDS II SZ 0 L27IN ABSRB VLT L26MM CT-2 1/2 CIR Z334H

## (undated) DEVICE — TROCAR ENDOSCP L100MM DIA15MM BLDELSS STBL SL ENDOPATH XCEL

## (undated) DEVICE — SEALANT HEMSTAT 5ML HUM FIBRIN THROM 2 VI APPL DEV EVICEL

## (undated) DEVICE — DRAPE SURG L39XW46IN PERI OB

## (undated) DEVICE — KIT CLN UP BON SECOURS MARYV

## (undated) DEVICE — TRAY PREP DRY W/ PREM GLV 2 APPL 6 SPNG 2 UNDPD 1 OVERWRAP

## (undated) DEVICE — SOL IRRIGATION INJ NACL 0.9% 500ML BTL

## (undated) DEVICE — FLEX ADVANTAGE 3000CC: Brand: FLEX ADVANTAGE

## (undated) DEVICE — RELOAD STPL H4.1X2MM DIA60MM THCK TISS GRN 6 ROW PWR GST B

## (undated) DEVICE — TELFA NON-ADHERENT ABSORBENT DRESSING: Brand: TELFA

## (undated) DEVICE — SOL IRR SOD CL 0.9% 1000ML BTL --

## (undated) DEVICE — TROCAR ENDOSCP BLDELSS 12X100 MM W/ HNDL STBL SL OPT TIP

## (undated) DEVICE — KENDALL SCD EXPRESS SLEEVES, KNEE LENGTH, MEDIUM: Brand: KENDALL SCD

## (undated) DEVICE — VISIGI 3D®  CALIBRATION SYSTEM  SIZE 36FR STD W/ BULB: Brand: BOEHRINGER® VISIGI 3D™ SLEEVE GASTRECTOMY CALIBRATION SYSTEM, SIZE 36FR W/BULB

## (undated) DEVICE — SUTURE ETHLN SZ 3-0 L30IN NONABSORBABLE BLK FSL L30MM 3/8 1671H

## (undated) DEVICE — X-RAY SPONGES,12 PLY: Brand: DERMACEA

## (undated) DEVICE — RELOAD STPL H1.8-3.8MM REG THCK TISS G 6 ROW GRIPPING SURF

## (undated) DEVICE — PREP SKN PREVAIL 40ML APPL --

## (undated) DEVICE — DRAIN SURG 15FR L3/16IN SIL RND 3/4 FLUT 3/16IN TRCR

## (undated) DEVICE — TROCAR LAP L100MM DIA5MM BLDELSS W/ STBL SL ENDOPATH XCEL

## (undated) DEVICE — SUTURE ETHIB EXCL BR GRN TAPR PT 2-0 30 X563H X563H

## (undated) DEVICE — APPLIER CLP L SHFT DIA12MM 20 ROT MULT LIGACLP

## (undated) DEVICE — SYR IRR CATH TIP LR ADPT 70ML -- CONVERT TO ITEM 363120

## (undated) DEVICE — GOWN ISOLATN REG BLU POLY UNISX W/ THMB LOOP

## (undated) DEVICE — BARIATRIC: Brand: MEDLINE INDUSTRIES, INC.

## (undated) DEVICE — STAPLER SKIN L440MM 32MM LNG 12 FIRING B FRM PWR + GRIPPING

## (undated) DEVICE — MEDI-VAC NON-CONDUCTIVE SUCTION TUBING: Brand: CARDINAL HEALTH

## (undated) DEVICE — SOLUTION LACTATED RINGERS INJECTION USP

## (undated) DEVICE — 3M™ BAIR PAWS FLEX™ WARMING GOWN, STANDARD, 20 PER CASE 81003: Brand: BAIR PAWS™

## (undated) DEVICE — NEEDLE INSUF L150MM DIA2MM DISP FOR PNEUMOPERI ENDOPATH

## (undated) DEVICE — MAJ-1414 SINGLE USE ADPATER BIOPSY VALV: Brand: SINGLE USE ADAPTOR BIOPSY VALVE

## (undated) DEVICE — TROCAR ENDOSCP L100MM DIA12MM STBL SL BLDELSS ENDOPATH XCEL

## (undated) DEVICE — DEVON™ KNEE AND BODY STRAP 60" X 3" (1.5 M X 7.6 CM): Brand: DEVON

## (undated) DEVICE — GOWN,REINFORCED,POLY,AURORA,XLARGE,STRL: Brand: MEDLINE